# Patient Record
Sex: FEMALE | Race: WHITE | Employment: UNEMPLOYED | URBAN - METROPOLITAN AREA
[De-identification: names, ages, dates, MRNs, and addresses within clinical notes are randomized per-mention and may not be internally consistent; named-entity substitution may affect disease eponyms.]

---

## 2017-02-07 ENCOUNTER — OFFICE VISIT (OUTPATIENT)
Dept: FAMILY MEDICINE CLINIC | Age: 22
End: 2017-02-07

## 2017-02-07 VITALS
OXYGEN SATURATION: 97 % | DIASTOLIC BLOOD PRESSURE: 68 MMHG | SYSTOLIC BLOOD PRESSURE: 114 MMHG | BODY MASS INDEX: 30.6 KG/M2 | HEIGHT: 64 IN | HEART RATE: 82 BPM | TEMPERATURE: 98.1 F | WEIGHT: 179.25 LBS | RESPIRATION RATE: 16 BRPM

## 2017-02-07 DIAGNOSIS — Z11.1 SCREENING-PULMONARY TB: ICD-10-CM

## 2017-02-07 DIAGNOSIS — Z23 ENCOUNTER FOR IMMUNIZATION: ICD-10-CM

## 2017-02-07 DIAGNOSIS — Z00.00 WELL ADULT EXAM: Primary | ICD-10-CM

## 2017-02-07 NOTE — PROGRESS NOTES
1. Have you been to the ER, urgent care clinic since your last visit? Hospitalized since your last visit? No    2. Have you seen or consulted any other health care providers outside of the [address] [place] [address] since your last visit? Include any pap smears or colon screening.  No    Chief Complaint   Patient presents with    Complete Physical     college    Labs     not fasting

## 2017-02-07 NOTE — PROGRESS NOTES
Subjective:   24 y.o. 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    Patient Active Problem List    Diagnosis Date Noted    Mild preeclampsia delivered 2014    S/P  section 2014    Term  delivered by  section, current hospitalization 2014     Current Outpatient Prescriptions   Medication Sig Dispense Refill    ALPRAZolam (XANAX) 1 mg tablet Take 1 Tab by mouth daily as needed for Anxiety. 30 Tab 2    PARoxetine (PAXIL) 20 mg tablet Take 1 Tab by mouth nightly. 30 Tab 2    amphetamine-dextroamphetamine XR (ADDERALL XR) 30 mg XR capsule Take 1 Cap by mouth every morning. Max Daily Amount: 30 mg. Please fill on or after 17. 30 Cap 0    buPROPion XL (WELLBUTRIN XL) 300 mg XL tablet Take 1 Tab by mouth every morning. 30 Tab 2    tretinoin (RETIN-A) 0.025 % topical cream DOT A PEA SIZE AMOUNT TO DRY SKIN AROUND FACE & RUB IN EVERY OTHER NIGHT FOR 3-4 WEEKS THEN NIGHTLY  12    ibuprofen (MOTRIN) 800 mg tablet Take 1 Tab by mouth every eight (8) hours as needed for Pain. 61 Tab 2     Family History   Problem Relation Age of Onset    Hypertension Father     Hypertension Mother      Social History   Substance Use Topics    Smoking status: Never Smoker    Smokeless tobacco: Never Used    Alcohol use No        Vision: 20/25 together and delmi individually without correction. ROS: Feeling generally well. No TIA's or unusual headaches, no dysphagia. No prolonged cough. No dyspnea or chest pain on exertion. No abdominal pain, change in bowel habits, black or bloody stools. No urinary tract symptoms. No new or unusual musculoskeletal symptoms. Specific concerns today: she is required to have flu shot, ppd, urine dip and CBC for the program.    Objective: The patient appears well, alert, oriented x 3, in no distress.   Visit Vitals    /68    Pulse 82    Temp 98.1 °F (36.7 °C) (Oral)    Resp 16    Ht 5' 4\" (1.626 m)    Wt 179 lb 4 oz (81.3 kg)  SpO2 97%    BMI 30.77 kg/m2     ENT normal.  Neck supple. No adenopathy or thyromegaly. ESTHER. Lungs are clear, good air entry, no wheezes, rhonchi or rales. S1 and S2 normal, no murmurs, regular rate and rhythm. Abdomen soft without tenderness, guarding, mass or organomegaly. Extremities show no edema, normal peripheral pulses. Neurological is normal, no focal findings. Breast and Pelvic exams are deferred. Assessment/Plan:   Well Woman  lose weight, increase physical activity, follow low fat diet, follow low salt diet, routine labs ordered  Encounter Diagnoses   Name Primary?  Well adult exam Yes    Screening-pulmonary TB     Encounter for immunization      Orders Placed This Encounter    Influenza virus vaccine (QUADRIVALENT PRES FREE SYRINGE) IM 3 years and older    CBC WITH AUTOMATED DIFF    AMB POC URINALYSIS DIP STICK AUTO W/O MICRO     I have discussed the diagnosis with the patient and the intended plan as seen in the above orders. The patient has received an after-visit summary and questions were answered concerning future plans. Patient conveyed understanding of the plan at the time of the visit.     George Beasley, MSN, ANP  2/7/2017

## 2017-02-08 LAB
BASOPHILS # BLD AUTO: 0 X10E3/UL (ref 0–0.2)
BASOPHILS NFR BLD AUTO: 0 %
EOSINOPHIL # BLD AUTO: 0.2 X10E3/UL (ref 0–0.4)
EOSINOPHIL NFR BLD AUTO: 2 %
ERYTHROCYTE [DISTWIDTH] IN BLOOD BY AUTOMATED COUNT: 13.2 % (ref 12.3–15.4)
HCT VFR BLD AUTO: 40.5 % (ref 34–46.6)
HGB BLD-MCNC: 13.4 G/DL (ref 11.1–15.9)
IMM GRANULOCYTES # BLD: 0 X10E3/UL (ref 0–0.1)
IMM GRANULOCYTES NFR BLD: 0 %
LYMPHOCYTES # BLD AUTO: 2 X10E3/UL (ref 0.7–3.1)
LYMPHOCYTES NFR BLD AUTO: 21 %
MCH RBC QN AUTO: 30.5 PG (ref 26.6–33)
MCHC RBC AUTO-ENTMCNC: 33.1 G/DL (ref 31.5–35.7)
MCV RBC AUTO: 92 FL (ref 79–97)
MONOCYTES # BLD AUTO: 0.7 X10E3/UL (ref 0.1–0.9)
MONOCYTES NFR BLD AUTO: 7 %
NEUTROPHILS # BLD AUTO: 6.8 X10E3/UL (ref 1.4–7)
NEUTROPHILS NFR BLD AUTO: 70 %
PLATELET # BLD AUTO: 298 X10E3/UL (ref 150–379)
RBC # BLD AUTO: 4.39 X10E6/UL (ref 3.77–5.28)
WBC # BLD AUTO: 9.6 X10E3/UL (ref 3.4–10.8)

## 2017-02-08 NOTE — PROGRESS NOTES
RECOMMENDATIONS:  Your blood count is in normal range.   A copy of this lab is provided for your college program required physical.

## 2017-02-13 ENCOUNTER — CLINICAL SUPPORT (OUTPATIENT)
Dept: FAMILY MEDICINE CLINIC | Age: 22
End: 2017-02-13

## 2017-02-13 VITALS — HEIGHT: 64 IN

## 2017-03-15 ENCOUNTER — OFFICE VISIT (OUTPATIENT)
Dept: FAMILY MEDICINE CLINIC | Age: 22
End: 2017-03-15

## 2017-03-15 VITALS
DIASTOLIC BLOOD PRESSURE: 84 MMHG | BODY MASS INDEX: 29.56 KG/M2 | SYSTOLIC BLOOD PRESSURE: 144 MMHG | HEIGHT: 64 IN | OXYGEN SATURATION: 99 % | RESPIRATION RATE: 16 BRPM | HEART RATE: 79 BPM | WEIGHT: 173.13 LBS | TEMPERATURE: 98 F

## 2017-03-15 DIAGNOSIS — F90.2 ADHD (ATTENTION DEFICIT HYPERACTIVITY DISORDER), COMBINED TYPE: ICD-10-CM

## 2017-03-15 DIAGNOSIS — F41.1 GAD (GENERALIZED ANXIETY DISORDER): ICD-10-CM

## 2017-03-15 RX ORDER — DEXTROAMPHETAMINE SACCHARATE, AMPHETAMINE ASPARTATE MONOHYDRATE, DEXTROAMPHETAMINE SULFATE AND AMPHETAMINE SULFATE 7.5; 7.5; 7.5; 7.5 MG/1; MG/1; MG/1; MG/1
30 CAPSULE, EXTENDED RELEASE ORAL
Qty: 30 CAP | Refills: 0 | Status: SHIPPED | OUTPATIENT
Start: 2017-03-15 | End: 2017-05-26 | Stop reason: SDUPTHER

## 2017-03-15 RX ORDER — ALPRAZOLAM 1 MG/1
1 TABLET ORAL
Qty: 30 TAB | Refills: 2 | Status: SHIPPED | OUTPATIENT
Start: 2017-03-15 | End: 2017-03-20 | Stop reason: ALTCHOICE

## 2017-03-15 RX ORDER — NORETHINDRONE ACETATE AND ETHINYL ESTRADIOL AND FERROUS FUMARATE 1MG-20(21)
KIT ORAL
Refills: 0 | COMMUNITY
Start: 2017-02-13 | End: 2017-05-26

## 2017-03-15 NOTE — MR AVS SNAPSHOT
Visit Information Date & Time Provider Department Dept. Phone Encounter #  
 3/15/2017 11:30 AM Tyson Chapin, NP [address] [facility] [phone] [id number] Upcoming Health Maintenance Date Due  
 HPV AGE 9Y-34Y (1 of 3 - Female 3 Dose Series) 9/19/2005 PAP AKA CERVICAL CYTOLOGY 9/19/2015 DTaP/Tdap/Td series (2 - Td) 5/15/2024 Allergies as of 3/15/2017  Review Complete On: 3/15/2017 By: Evon Linn LPN No Known Allergies Current Immunizations  Reviewed on 5/15/2014 Name Date Influenza Vaccine (Quad) PF 2/7/2017 Tdap 5/15/2014 Not reviewed this visit You Were Diagnosed With   
  
 Codes Comments ADHD (attention deficit hyperactivity disorder), combined type     ICD-10-CM: F90.2 ICD-9-CM: 314.01   
 ROSALIO (generalized anxiety disorder)     ICD-10-CM: F41.1 ICD-9-CM: 300.02 Vitals BP Pulse Temp Resp Height(growth percentile) Weight(growth percentile) 144/84 79 98 °F (36.7 °C) (Oral) 16 5' 4\" (1.626 m) 173 lb 2 oz (78.5 kg) LMP SpO2 Breastfeeding? BMI OB Status Smoking Status 02/22/2017 (Approximate) 99% No 29.72 kg/m2 Having regular periods Never Smoker Vitals History BMI and BSA Data Body Mass Index Body Surface Area  
 29.72 kg/m 2 1.88 m 2 Preferred Pharmacy Pharmacy Name Phone [id number] [place], [address] [facility] [phone] Your Updated Medication List  
  
   
This list is accurate as of: 3/15/17 11:54 AM.  Always use your most recent med list.  
  
  
  
  
 ALPRAZolam 1 mg tablet Commonly known as:  Alla Lilian Take 1 Tab by mouth daily as needed for Anxiety. * amphetamine-dextroamphetamine XR 30 mg XR capsule Commonly known as:  ADDERALL XR Take 1 Cap (30 mg total) by mouth every morning.  - must last 30 days * amphetamine-dextroamphetamine XR 30 mg XR capsule Commonly known as:  ADDERALL XR  
 Take 1 Cap (30 mg total) by mouth every morning.  -must last 30 days * amphetamine-dextroamphetamine XR 30 mg XR capsule Commonly known as:  ADDERALL XR Take 1 Cap (30 mg total) by mouth every morning.  - must last 30 days buPROPion  mg XL tablet Commonly known as:  Abhishek Emerald Take 1 Tab by mouth every morning. ibuprofen 800 mg tablet Commonly known as:  MOTRIN Take 1 Tab by mouth every eight (8) hours as needed for Pain. JUNEL FE 1/20 (28) 1 mg-20 mcg (21)/75 mg (7) Tab Generic drug:  norethindrone-ethinyl estradiol TAKE 1 TABLET BY MOUTH ONCE DAILY. PARoxetine 20 mg tablet Commonly known as:  PAXIL Take 1 Tab by mouth nightly. tretinoin 0.025 % topical cream  
Commonly known as:  RETIN-A  
DOT A PEA SIZE AMOUNT TO DRY SKIN AROUND FACE & RUB IN EVERY OTHER NIGHT FOR 3-4 WEEKS THEN NIGHTLY * Notice: This list has 3 medication(s) that are the same as other medications prescribed for you. Read the directions carefully, and ask your doctor or other care provider to review them with you. Prescriptions Printed Refills  
 amphetamine-dextroamphetamine XR (ADDERALL XR) 30 mg XR capsule 0 Sig: Take 1 Cap (30 mg total) by mouth every morning.  - must last 30 days Class: Print Route: Oral  
 amphetamine-dextroamphetamine XR (ADDERALL XR) 30 mg XR capsule 0 Sig: Take 1 Cap (30 mg total) by mouth every morning.  -must last 30 days Class: Print Route: Oral  
 amphetamine-dextroamphetamine XR (ADDERALL XR) 30 mg XR capsule 0 Sig: Take 1 Cap (30 mg total) by mouth every morning.  - must last 30 days Class: Print Route: Oral  
 ALPRAZolam (XANAX) 1 mg tablet 2 Sig: Take 1 Tab by mouth daily as needed for Anxiety. Class: Print Route: Oral  
  
Introducing [facility]! Dear Hilton Broderick: Thank you for requesting a [employer] account.   Our records indicate that you already have an active [employer] account. You can access your account anytime at [email or web address] Did you know that you can access your hospital and ER discharge instructions at any time in [employer]? You can also review all of your test results from your hospital stay or ER visit. Additional Information If you have questions, please visit the Frequently Asked Questions section of the [employer] website at [email or web address] Remember, [employer] is NOT to be used for urgent needs. For medical emergencies, dial 911. Now available from your iPhone and Android! Please provide this summary of care documentation to your next provider. Your primary care clinician is listed as Amanuel Georges. If you have any questions after today's visit, please call [phone].

## 2017-03-15 NOTE — PROGRESS NOTES
HISTORY OF PRESENT ILLNESS  Mark Garcia is a 24 y.o. female. HPI  Patient returns to office for follow up ADD. Stable on medication without weight loss, decreased appetite, insomnia, or tremor. Good focus control. Gets three months of scripts at a time. She is on Adderall XR 30mg a day. She also needs to have xanax refilled that uses nightly. ROS  A comprehensive review of system was obtained and negative except findings in the HPI    Visit Vitals    /84    Pulse 79    Temp 98 °F (36.7 °C) (Oral)    Resp 16    Ht 5' 4\" (1.626 m)    Wt 173 lb 2 oz (78.5 kg)    LMP 02/22/2017 (Approximate)    SpO2 99%    Breastfeeding No    BMI 29.72 kg/m2     Physical Exam   Constitutional: She is oriented to person, place, and time. She appears well-developed and well-nourished. Neck: No JVD present. Cardiovascular: Normal rate, regular rhythm and intact distal pulses. Exam reveals no gallop and no friction rub. No murmur heard. Pulmonary/Chest: Effort normal and breath sounds normal. No respiratory distress. She has no wheezes. Musculoskeletal: She exhibits no edema. Neurological: She is alert and oriented to person, place, and time. Skin: Skin is warm. Nursing note and vitals reviewed. ASSESSMENT and PLAN  Encounter Diagnoses   Name Primary?  ADHD (attention deficit hyperactivity disorder), combined type     ROSALIO (generalized anxiety disorder)      Orders Placed This Encounter    amphetamine-dextroamphetamine XR (ADDERALL XR) 30 mg XR capsule    amphetamine-dextroamphetamine XR (ADDERALL XR) 30 mg XR capsule    amphetamine-dextroamphetamine XR (ADDERALL XR) 30 mg XR capsule    ALPRAZolam (XANAX) 1 mg tablet     Given refills x 3 mo of both meds  Reviewed alternative like Strattera for the future    I have discussed the diagnosis with the patient and the intended plan as seen in the above orders.  The patient has received an after-visit summary and questions were answered concerning future plans. Patient conveyed understanding of the plan at the time of the visit.     Yunior Kaplan, MSN, ANP  3/15/2017

## 2017-03-15 NOTE — PROGRESS NOTES
1. Have you been to the ER, urgent care clinic since your last visit? Hospitalized since your last visit? No    2. Have you seen or consulted any other health care providers outside of the [address] [place] [address] since your last visit? Include any pap smears or colon screening.  No    Chief Complaint   Patient presents with    Follow-up     med ck

## 2017-03-20 RX ORDER — CLONAZEPAM 0.5 MG/1
0.5 TABLET ORAL 2 TIMES DAILY
Qty: 60 TAB | Refills: 2 | OUTPATIENT
Start: 2017-03-20 | End: 2017-09-26

## 2017-05-26 ENCOUNTER — OFFICE VISIT (OUTPATIENT)
Dept: FAMILY MEDICINE CLINIC | Age: 22
End: 2017-05-26

## 2017-05-26 VITALS
SYSTOLIC BLOOD PRESSURE: 112 MMHG | OXYGEN SATURATION: 99 % | BODY MASS INDEX: 29.02 KG/M2 | WEIGHT: 170 LBS | HEART RATE: 82 BPM | RESPIRATION RATE: 18 BRPM | TEMPERATURE: 98.2 F | HEIGHT: 64 IN | DIASTOLIC BLOOD PRESSURE: 70 MMHG

## 2017-05-26 DIAGNOSIS — F90.2 ADHD (ATTENTION DEFICIT HYPERACTIVITY DISORDER), COMBINED TYPE: ICD-10-CM

## 2017-05-26 DIAGNOSIS — G47.00 INSOMNIA, UNSPECIFIED: ICD-10-CM

## 2017-05-26 DIAGNOSIS — R53.82 CHRONIC FATIGUE: Primary | ICD-10-CM

## 2017-05-26 RX ORDER — DEXTROAMPHETAMINE SACCHARATE, AMPHETAMINE ASPARTATE MONOHYDRATE, DEXTROAMPHETAMINE SULFATE AND AMPHETAMINE SULFATE 7.5; 7.5; 7.5; 7.5 MG/1; MG/1; MG/1; MG/1
30 CAPSULE, EXTENDED RELEASE ORAL
Qty: 30 CAP | Refills: 0 | Status: SHIPPED | OUTPATIENT
Start: 2017-05-26 | End: 2017-06-19 | Stop reason: SDUPTHER

## 2017-05-26 RX ORDER — CLONIDINE HYDROCHLORIDE 0.1 MG/1
0.1 TABLET ORAL
Qty: 30 TAB | Refills: 2 | Status: SHIPPED | OUTPATIENT
Start: 2017-05-26 | End: 2017-09-26

## 2017-05-26 NOTE — PROGRESS NOTES
Chief Complaint   Patient presents with    Behavioral Problem     adhd    Depression    Anxiety    Labs     iron     Patient in office today for med check; states medication is doing well for adhd;     Pt would like to discuss with md wellbutrin; states depression sx have gotten worse in the past couple of weeks; Pt states she would like to have labs drawn due to severe fatigue. 1. Have you been to the ER, urgent care clinic since your last visit? Hospitalized since your last visit? No    2. Have you seen or consulted any other health care providers outside of the [address] [place] [address] since your last visit? Include any pap smears or colon screening. No    Monday - Thursday will get her daughter ready and drop her off at . Going to school from 9-145. Short break, and then working from about 3-11. Has been trying to exercise when she can squeeze it in. Pt has not been able to sleep well. Staying up until 2-3 am. Will have restless nights. Has been seeing a therapist for behavioral.     Struggling with school stress and co parenting. Situational issues. wellbutrin has helped with appetite and cravings. Denies any other concerns at this time. Chief Complaint   Patient presents with    Behavioral Problem     adhd    Depression    Anxiety    Labs     iron     she is a 24y.o. year old female who presents for evalution. Reviewed PmHx, RxHx, FmHx, SocHx, AllgHx and updated and dated in the chart.     Review of Systems - negative except as listed above in the HPI    Objective:     Vitals:    05/26/17 1029   BP: 112/70   Pulse: 82   Resp: 18   Temp: 98.2 °F (36.8 °C)   TempSrc: Oral   SpO2: 99%   Weight: 170 lb (77.1 kg)   Height: 5' 4\" (1.626 m)     Physical Examination: General appearance - alert, well appearing, and in no distress  Mental status - normal mood, behavior  Eyes - pupils equal and reactive, extraocular eye movements intact  Ears - bilateral TM's and external ear canals normal  Nose - normal and patent, no erythema, discharge or polyps and normal nontender sinuses  Mouth - mucous membranes moist, pharynx normal without lesions  Neck - supple, no significant adenopathy, carotids upstroke normal bilaterally, no bruits, thyroid exam: thyroid is normal in size without nodules or tenderness  Chest - clear to auscultation, no wheezes, rales or rhonchi, symmetric air entry  Heart - normal rate, regular rhythm, normal S1, S2, no murmurs  Extremities - peripheral pulses normal, no ankle edema, no clubbing or cyanosis  Skin - normal coloration and turgor, no rashes, no suspicious skin lesions noted    Assessment/ Plan:   Arthur Jones was seen today for behavioral problem, depression, anxiety and labs. Diagnoses and all orders for this visit:    Chronic fatigue  -     METABOLIC PANEL, COMPREHENSIVE  -     CBC WITH AUTOMATED DIFF  -     TSH 3RD GENERATION  -     IRON PROFILE  Will notify results and deviate plan based on findings. ADHD (attention deficit hyperactivity disorder), combined type  -     amphetamine-dextroamphetamine XR (ADDERALL XR) 30 mg XR capsule; Take 1 Cap (30 mg total) by mouth every morning. Please fill on or after 6/25/17.  -     amphetamine-dextroamphetamine XR (ADDERALL XR) 30 mg XR capsule; Take 1 Cap (30 mg total) by mouth every morning. Max Daily Amount: 30 mg  -     amphetamine-dextroamphetamine XR (ADDERALL XR) 30 mg XR capsule; Take 1 Cap (30 mg total) by mouth every morning. Please fill on or after 7/25/17. Max Daily Amount: 30 mg  Continue current dose of adderall as directed. Insomnia, unspecified  -     cloNIDine HCl (CATAPRES) 0.1 mg tablet; Take 1 Tab by mouth nightly as needed. Enc pt to try clonidine before bedtime to address insomnia as this is likely contributing to her fatigue. Reviewed SEs/ADRs of medication. Follow up if sx persist or worsen. Follow-up Disposition:  Return if symptoms worsen or fail to improve.     I have discussed the diagnosis with the patient and the intended plan as seen in the above orders. The patient has received an after-visit summary and questions were answered concerning future plans. Medication Side Effects and Warnings were discussed with patient: yes  Patient Labs were reviewed and or requested: no  Patient Past Records were reviewed and or requested  yes  Patient / Caregiver Understanding of treatment plan was verbalized during office visit YES    HALEY Coombs    There are no Patient Instructions on file for this visit.

## 2017-05-26 NOTE — PROGRESS NOTES
Chief Complaint   Patient presents with    Behavioral Problem     adhd    Depression    Anxiety    Labs     iron     Patient in office today for med check; states medication is doing well for adhd; would like to discuss with md wellbutrin; states depression sx have gotten worse in the past couple of weeks; pt states she would like to have labs drawn due to severe fatigue. 1. Have you been to the ER, urgent care clinic since your last visit? Hospitalized since your last visit? No    2. Have you seen or consulted any other health care providers outside of the [facility] since your last visit? Include any pap smears or colon screening.  No

## 2017-05-27 LAB
ALBUMIN SERPL-MCNC: 4.5 G/DL (ref 3.5–5.5)
ALBUMIN/GLOB SERPL: 2.3 {RATIO} (ref 1.2–2.2)
ALP SERPL-CCNC: 87 IU/L (ref 39–117)
ALT SERPL-CCNC: 21 IU/L (ref 0–32)
AST SERPL-CCNC: 23 IU/L (ref 0–40)
BASOPHILS # BLD AUTO: 0 X10E3/UL (ref 0–0.2)
BASOPHILS NFR BLD AUTO: 0 %
BILIRUB SERPL-MCNC: <0.2 MG/DL (ref 0–1.2)
BUN SERPL-MCNC: 13 MG/DL (ref 6–20)
BUN/CREAT SERPL: 16 (ref 9–23)
CALCIUM SERPL-MCNC: 9.2 MG/DL (ref 8.7–10.2)
CHLORIDE SERPL-SCNC: 103 MMOL/L (ref 96–106)
CO2 SERPL-SCNC: 24 MMOL/L (ref 18–29)
CREAT SERPL-MCNC: 0.79 MG/DL (ref 0.57–1)
EOSINOPHIL # BLD AUTO: 0.3 X10E3/UL (ref 0–0.4)
EOSINOPHIL NFR BLD AUTO: 3 %
ERYTHROCYTE [DISTWIDTH] IN BLOOD BY AUTOMATED COUNT: 12.8 % (ref 12.3–15.4)
GLOBULIN SER CALC-MCNC: 2 G/DL (ref 1.5–4.5)
GLUCOSE SERPL-MCNC: 78 MG/DL (ref 65–99)
HCT VFR BLD AUTO: 39.8 % (ref 34–46.6)
HGB BLD-MCNC: 12.8 G/DL (ref 11.1–15.9)
IMM GRANULOCYTES # BLD: 0 X10E3/UL (ref 0–0.1)
IMM GRANULOCYTES NFR BLD: 0 %
IRON SATN MFR SERPL: 15 % (ref 15–55)
IRON SERPL-MCNC: 53 UG/DL (ref 27–159)
LYMPHOCYTES # BLD AUTO: 3.1 X10E3/UL (ref 0.7–3.1)
LYMPHOCYTES NFR BLD AUTO: 32 %
MCH RBC QN AUTO: 30 PG (ref 26.6–33)
MCHC RBC AUTO-ENTMCNC: 32.2 G/DL (ref 31.5–35.7)
MCV RBC AUTO: 93 FL (ref 79–97)
MONOCYTES # BLD AUTO: 0.8 X10E3/UL (ref 0.1–0.9)
MONOCYTES NFR BLD AUTO: 8 %
NEUTROPHILS # BLD AUTO: 5.4 X10E3/UL (ref 1.4–7)
NEUTROPHILS NFR BLD AUTO: 57 %
PLATELET # BLD AUTO: 263 X10E3/UL (ref 150–379)
POTASSIUM SERPL-SCNC: 4 MMOL/L (ref 3.5–5.2)
PROT SERPL-MCNC: 6.5 G/DL (ref 6–8.5)
RBC # BLD AUTO: 4.26 X10E6/UL (ref 3.77–5.28)
SODIUM SERPL-SCNC: 143 MMOL/L (ref 134–144)
TIBC SERPL-MCNC: 360 UG/DL (ref 250–450)
TSH SERPL DL<=0.005 MIU/L-ACNC: 2.29 UIU/ML (ref 0.45–4.5)
UIBC SERPL-MCNC: 307 UG/DL (ref 131–425)
WBC # BLD AUTO: 9.7 X10E3/UL (ref 3.4–10.8)

## 2017-05-30 RX ORDER — FLUCONAZOLE 150 MG/1
150 TABLET ORAL DAILY
Qty: 1 TAB | Refills: 1 | Status: SHIPPED | OUTPATIENT
Start: 2017-05-30 | End: 2017-05-31

## 2017-05-30 NOTE — PROGRESS NOTES
The following message was sent to pt via [employer] portal in reference to lab results:    Good evening Ms. Gamal Man are the results of your most recent lab work. I have the following recommendations:    1. Your CBC which looks at your white blood cells, red blood cells, and hemoglobin came back looking normal. No sign of infection or anemia. 2. Your metabolic panel which looks at your blood glucose, liver function, and kidney function looks perfect. 3. Your TSH which screens for thyroid disease came back normal. This means you do not have hyper or hypothyroidism. 4. Your iron levels are normal.     I recommend you continue with the plan discussed during your office visit and follow up if the chronic fatigue or insomnia persist or worsen.      ETHAN LottC

## 2017-06-19 ENCOUNTER — TELEPHONE (OUTPATIENT)
Dept: FAMILY MEDICINE CLINIC | Age: 22
End: 2017-06-19

## 2017-06-19 DIAGNOSIS — F90.2 ADHD (ATTENTION DEFICIT HYPERACTIVITY DISORDER), COMBINED TYPE: ICD-10-CM

## 2017-06-19 RX ORDER — DEXTROAMPHETAMINE SACCHARATE, AMPHETAMINE ASPARTATE MONOHYDRATE, DEXTROAMPHETAMINE SULFATE AND AMPHETAMINE SULFATE 7.5; 7.5; 7.5; 7.5 MG/1; MG/1; MG/1; MG/1
30 CAPSULE, EXTENDED RELEASE ORAL
Qty: 30 CAP | Refills: 0 | Status: SHIPPED | OUTPATIENT
Start: 2017-06-19 | End: 2017-09-26 | Stop reason: SDUPTHER

## 2017-06-19 NOTE — TELEPHONE ENCOUNTER
Pt called stating that she needs to get an early refill on her adderall because the date on the June rx is wrong, pt stated that she already had the May rx from her previous appt and she filled it on May 19th, pt states she only rec'd June and July at her last ov. Pt would like a call back at [phone]. Natalia and RONAK both tried to explain to pt that in her chart it showed that she rec'd May, June, and July.

## 2017-06-19 NOTE — TELEPHONE ENCOUNTER
Spoke with pt stated she had med check on 5/25; pt states script on rx's for travon needs to say the 19th of each month for fill date; pt stated she filled her last rx on 5/19; need for nurse to call and give approval to fill early for June and July or  new scripts with correct dates.

## 2017-08-24 ENCOUNTER — OFFICE VISIT (OUTPATIENT)
Dept: FAMILY MEDICINE CLINIC | Age: 22
End: 2017-08-24

## 2017-08-24 VITALS
BODY MASS INDEX: 30.56 KG/M2 | HEIGHT: 64 IN | SYSTOLIC BLOOD PRESSURE: 137 MMHG | DIASTOLIC BLOOD PRESSURE: 78 MMHG | HEART RATE: 89 BPM | WEIGHT: 179 LBS | TEMPERATURE: 98 F | RESPIRATION RATE: 16 BRPM | OXYGEN SATURATION: 99 %

## 2017-08-24 DIAGNOSIS — K52.9 GASTROENTERITIS: Primary | ICD-10-CM

## 2017-08-24 DIAGNOSIS — N94.6 DYSMENORRHEA: ICD-10-CM

## 2017-08-24 RX ORDER — NORETHINDRONE ACETATE AND ETHINYL ESTRADIOL 1; .02 MG/1; MG/1
1 TABLET ORAL DAILY
Qty: 1 PACKAGE | Refills: 11 | Status: SHIPPED | OUTPATIENT
Start: 2017-08-24 | End: 2018-04-04 | Stop reason: ALTCHOICE

## 2017-08-24 RX ORDER — ONDANSETRON 8 MG/1
8 TABLET, ORALLY DISINTEGRATING ORAL
Qty: 15 TAB | Refills: 0 | Status: SHIPPED | OUTPATIENT
Start: 2017-08-24 | End: 2017-09-26

## 2017-08-24 NOTE — PROGRESS NOTES
1. Have you been to the ER, urgent care clinic since your last visit? Hospitalized since your last visit? No    2. Have you seen or consulted any other health care providers outside of the [address] [place] [address] since your last visit? Include any pap smears or colon screening.  No     Chief Complaint   Patient presents with    Nausea     x 1 day    Diarrhea     x 2 days

## 2017-08-24 NOTE — MR AVS SNAPSHOT
Visit Information Date & Time Provider Department Dept. Phone Encounter #  
 8/24/2017  9:15 AM Sol Laguerre NP [address] [facility] [phone] [id number] Follow-up Instructions Return if symptoms worsen or fail to improve. Upcoming Health Maintenance Date Due  
 HPV AGE 9Y-34Y (1 of 3 - Female 3 Dose Series) 9/19/2005 PAP AKA CERVICAL CYTOLOGY 9/19/2015 INFLUENZA AGE 9 TO ADULT 8/1/2017 DTaP/Tdap/Td series (2 - Td) 5/15/2024 Allergies as of 8/24/2017  Review Complete On: 8/24/2017 By: Sol Laguerre NP No Known Allergies Current Immunizations  Reviewed on 5/15/2014 Name Date Influenza Vaccine (Quad) PF 2/7/2017 Tdap 5/15/2014 Not reviewed this visit You Were Diagnosed With   
  
 Codes Comments Gastroenteritis    -  Primary ICD-10-CM: K52.9 ICD-9-CM: 558. 9 Dysmenorrhea     ICD-10-CM: N94.6 ICD-9-CM: 918. 3 Vitals BP Pulse Temp Resp Height(growth percentile) Weight(growth percentile)  
 137/78 89 98 °F (36.7 °C) (Oral) 16 5' 4\" (1.626 m) 179 lb (81.2 kg) LMP SpO2 BMI OB Status Smoking Status 08/01/2017 (Exact Date) 99% 30.73 kg/m2 Having regular periods Never Smoker BMI and BSA Data Body Mass Index Body Surface Area 30.73 kg/m 2 1.91 m 2 Preferred Pharmacy Pharmacy Name Phone [id number] [address] [phone] Your Updated Medication List  
  
   
This list is accurate as of: 8/24/17  9:49 AM.  Always use your most recent med list.  
  
  
  
  
 * amphetamine-dextroamphetamine XR 30 mg XR capsule Commonly known as:  ADDERALL XR Take 1 Cap (30 mg total) by mouth every morning. Please fill on or after 8/18/17. * amphetamine-dextroamphetamine XR 30 mg XR capsule Commonly known as:  ADDERALL XR Take 1 Cap (30 mg total) by mouth every morning. Please fill on or after 7/19/17.  Max Daily Amount: 30 mg  
  
 * amphetamine-dextroamphetamine XR 30 mg XR capsule Commonly known as:  ADDERALL XR Take 1 Cap (30 mg total) by mouth every morning. Max Daily Amount: 30 mg  
  
 buPROPion  mg XL tablet Commonly known as:  Ahmetajit Dm Take 1 Tab by mouth every morning. clonazePAM 0.5 mg tablet Commonly known as:  Maxine Richard Take 1 Tab by mouth two (2) times a day. cloNIDine HCl 0.1 mg tablet Commonly known as:  CATAPRES Take 1 Tab by mouth nightly as needed. norethindrone-ethinyl estradiol 1-20 mg-mcg Tab Commonly known as:  JUNEL 1/20 (21) Take 1 Tab by mouth daily. ondansetron 8 mg disintegrating tablet Commonly known as:  ZOFRAN ODT Take 1 Tab by mouth every eight (8) hours as needed for Nausea. * Notice: This list has 3 medication(s) that are the same as other medications prescribed for you. Read the directions carefully, and ask your doctor or other care provider to review them with you. Prescriptions Sent to Pharmacy Refills  
 ondansetron (ZOFRAN ODT) 8 mg disintegrating tablet 0 Sig: Take 1 Tab by mouth every eight (8) hours as needed for Nausea. Class: Normal  
 Pharmacy: [facility] Ph #: [phone] Route: Oral  
 norethindrone-ethinyl estradiol (JUNEL 1/20, 21,) 1-20 mg-mcg tab 11 Sig: Take 1 Tab by mouth daily. Class: Normal  
 Pharmacy: [facility] Ph #: [phone] Route: Oral  
  
Follow-up Instructions Return if symptoms worsen or fail to improve. Patient Instructions Gastroenteritis: Care Instructions Your Care Instructions Gastroenteritis is an illness that may cause nausea, vomiting, and diarrhea. It is sometimes called \"stomach flu. \" It can be caused by bacteria or a virus. You will probably begin to feel better in 1 to 2 days.  In the meantime, get plenty of rest and make sure you do not become dehydrated. Dehydration occurs when your body loses too much fluid. Follow-up care is a key part of your treatment and safety. Be sure to make and go to all appointments, and call your doctor if you are having problems. Its also a good idea to know your test results and keep a list of the medicines you take. How can you care for yourself at home? · If your doctor prescribed antibiotics, take them as directed. Do not stop taking them just because you feel better. You need to take the full course of antibiotics. · Drink plenty of fluids to prevent dehydration, enough so that your urine is light yellow or clear like water. Choose water and other caffeine-free clear liquids until you feel better. If you have kidney, heart, or liver disease and have to limit fluids, talk with your doctor before you increase your fluid intake. · Drink fluids slowly, in frequent, small amounts, because drinking too much too fast can cause vomiting. · Begin eating mild foods, such as dry toast, yogurt, applesauce, bananas, and rice. Avoid spicy, hot, or high-fat foods, and do not drink alcohol or caffeine for a day or two. Do not drink milk or eat ice cream until you are feeling better. How to prevent gastroenteritis · Keep hot foods hot and cold foods cold. · Do not eat meats, dressings, salads, or other foods that have been kept at room temperature for more than 2 hours. · Use a thermometer to check your refrigerator. It should be between 34°F and 40°F. 
· Defrost meats in the refrigerator or microwave, not on the kitchen counter. · Keep your hands and your kitchen clean. Wash your hands, cutting boards, and countertops with hot soapy water frequently. · Cook meat until it is well done. · Do not eat raw eggs or uncooked sauces made with raw eggs. · Do not take chances. If food looks or tastes spoiled, throw it out. When should you call for help? Call 911 anytime you think you may need emergency care. For example, call if: 
· You vomit blood or what looks like coffee grounds. · You passed out (lost consciousness). · You pass maroon or very bloody stools. Call your doctor now or seek immediate medical care if: 
· You have severe belly pain. · You have signs of needing more fluids. You have sunken eyes, a dry mouth, and pass only a little dark urine. · You feel like you are going to faint. · You have increased belly pain that does not go away in 1 to 2 days. · You have new or increased nausea, or you are vomiting. · You have a new or higher fever. · Your stools are black and tarlike or have streaks of blood. Watch closely for changes in your health, and be sure to contact your doctor if: 
· You are dizzy or lightheaded. · You urinate less than usual, or your urine is dark yellow or brown. · You do not feel better with each day that goes by. Where can you learn more? Go to http://luly-donavon.info/. Enter N142 in the search box to learn more about \"Gastroenteritis: Care Instructions. \" Current as of: March 3, 2017 Content Version: 11.3 © 9850-4152 Ar[employer]. Care instructions adapted under license by [employer] (which disclaims liability or warranty for this information). If you have questions about a medical condition or this instruction, always ask your healthcare professional. [place] [address] any warranty or liability for your use of this information. Introducing [facility] & HEALTH SERVICES! Dear Geneva Dela Cruz: Thank you for requesting a datango account. Our records indicate that you already have an active datango account. You can access your account anytime at [email or web address] Did you know that you can access your hospital and ER discharge instructions at any time in datango? You can also review all of your test results from your hospital stay or ER visit. Additional Information If you have questions, please visit the Frequently Asked Questions section of the [email or web address] website at [email or web address] Remember, [employer] is NOT to be used for urgent needs. For medical emergencies, dial 911. Now available from your iPhone and Android! Please provide this summary of care documentation to your next provider. Your primary care clinician is listed as Amanuel Georges. If you have any questions after today's visit, please call [phone].

## 2017-08-24 NOTE — PATIENT INSTRUCTIONS
Gastroenteritis: Care Instructions  Your Care Instructions  Gastroenteritis is an illness that may cause nausea, vomiting, and diarrhea. It is sometimes called \"stomach flu. \" It can be caused by bacteria or a virus. You will probably begin to feel better in 1 to 2 days. In the meantime, get plenty of rest and make sure you do not become dehydrated. Dehydration occurs when your body loses too much fluid. Follow-up care is a key part of your treatment and safety. Be sure to make and go to all appointments, and call your doctor if you are having problems. Its also a good idea to know your test results and keep a list of the medicines you take. How can you care for yourself at home? · If your doctor prescribed antibiotics, take them as directed. Do not stop taking them just because you feel better. You need to take the full course of antibiotics. · Drink plenty of fluids to prevent dehydration, enough so that your urine is light yellow or clear like water. Choose water and other caffeine-free clear liquids until you feel better. If you have kidney, heart, or liver disease and have to limit fluids, talk with your doctor before you increase your fluid intake. · Drink fluids slowly, in frequent, small amounts, because drinking too much too fast can cause vomiting. · Begin eating mild foods, such as dry toast, yogurt, applesauce, bananas, and rice. Avoid spicy, hot, or high-fat foods, and do not drink alcohol or caffeine for a day or two. Do not drink milk or eat ice cream until you are feeling better. How to prevent gastroenteritis  · Keep hot foods hot and cold foods cold. · Do not eat meats, dressings, salads, or other foods that have been kept at room temperature for more than 2 hours. · Use a thermometer to check your refrigerator. It should be between 34°F and 40°F.  · Defrost meats in the refrigerator or microwave, not on the kitchen counter. · Keep your hands and your kitchen clean.  Wash your hands, cutting boards, and countertops with hot soapy water frequently. · Cook meat until it is well done. · Do not eat raw eggs or uncooked sauces made with raw eggs. · Do not take chances. If food looks or tastes spoiled, throw it out. When should you call for help? Call 911 anytime you think you may need emergency care. For example, call if:  · You vomit blood or what looks like coffee grounds. · You passed out (lost consciousness). · You pass maroon or very bloody stools. Call your doctor now or seek immediate medical care if:  · You have severe belly pain. · You have signs of needing more fluids. You have sunken eyes, a dry mouth, and pass only a little dark urine. · You feel like you are going to faint. · You have increased belly pain that does not go away in 1 to 2 days. · You have new or increased nausea, or you are vomiting. · You have a new or higher fever. · Your stools are black and tarlike or have streaks of blood. Watch closely for changes in your health, and be sure to contact your doctor if:  · You are dizzy or lightheaded. · You urinate less than usual, or your urine is dark yellow or brown. · You do not feel better with each day that goes by. Where can you learn more? Go to http://luly-donavon.info/. Enter N142 in the search box to learn more about \"Gastroenteritis: Care Instructions. \"  Current as of: March 3, 2017  Content Version: 11.3  © 7649-7475 [employer]Ment[employer]. Care instructions adapted under license by Minimally invasive devices (which disclaims liability or warranty for this information). If you have questions about a medical condition or this instruction, always ask your healthcare professional. [address] any warranty or liability for your use of this information.

## 2017-08-24 NOTE — PROGRESS NOTES
Chief Complaint   Patient presents with    Nausea     x 1 day    Diarrhea     x 2 days     she is a 24y.o. year old female who presents for evalution. Yesterday pt woke up feeling poorly, has been having nausea/vomiting/diarrhea. Body aches, chills, headaches. Also requesting refills on BCP. States last pap was last year and normal at GYN. Has painful periods. Reviewed PmHx, RxHx, FmHx, SocHx, AllgHx and updated and dated in the chart. Review of Systems - negative except as listed above in the HPI    Objective:     Vitals:    08/24/17 0914   BP: 137/78   Pulse: 89   Resp: 16   Temp: 98 °F (36.7 °C)   TempSrc: Oral   SpO2: 99%   Weight: 179 lb (81.2 kg)   Height: 5' 4\" (1.626 m)     Physical Examination: General appearance - alert, well appearing, and in no distress  Chest - clear to auscultation, no wheezes, rales or rhonchi, symmetric air entry  Heart - normal rate, regular rhythm, normal S1, S2, no murmurs, rubs, clicks or gallops  Abdomen - soft, nontender, nondistended, no masses or organomegaly  bowel sounds hyperactive    Assessment/ Plan:   Diagnoses and all orders for this visit:    1. Gastroenteritis  -     ondansetron (ZOFRAN ODT) 8 mg disintegrating tablet; Take 1 Tab by mouth every eight (8) hours as needed for Nausea. JOHNNA diet, advance as tolerated. Gatorade to help with dehydration. F/U prn    2. Dysmenorrhea  -     norethindrone-ethinyl estradiol (JUNEL 1/20, 21,) 1-20 mg-mcg tab; Take 1 Tab by mouth daily. Stable, refills provided. Pt voiced understanding regarding plan of care. Follow-up Disposition:  Return if symptoms worsen or fail to improve. I have discussed the diagnosis with the patient and the intended plan as seen in the above orders. The patient has received an after-visit summary and questions were answered concerning future plans.      Medication Side Effects and Warnings were discussed with patient    Sindy Mckeon NP

## 2017-08-24 NOTE — LETTER
8/24/2017 9:49 AM 
 
Ms. Trina Verma [address] [facility] [address] Please excuse Ms. Courtney Huff from school 8/24/17 and 8/25/17 due to illness. Sincerely, Sj Rdz NP

## 2017-09-26 ENCOUNTER — OFFICE VISIT (OUTPATIENT)
Dept: FAMILY MEDICINE CLINIC | Age: 22
End: 2017-09-26

## 2017-09-26 VITALS
HEART RATE: 94 BPM | OXYGEN SATURATION: 97 % | HEIGHT: 64 IN | WEIGHT: 176 LBS | RESPIRATION RATE: 18 BRPM | DIASTOLIC BLOOD PRESSURE: 80 MMHG | BODY MASS INDEX: 30.05 KG/M2 | TEMPERATURE: 98.6 F | SYSTOLIC BLOOD PRESSURE: 124 MMHG

## 2017-09-26 DIAGNOSIS — E66.9 OBESITY (BMI 30.0-34.9): ICD-10-CM

## 2017-09-26 DIAGNOSIS — F90.2 ADHD (ATTENTION DEFICIT HYPERACTIVITY DISORDER), COMBINED TYPE: Primary | ICD-10-CM

## 2017-09-26 DIAGNOSIS — F41.1 GAD (GENERALIZED ANXIETY DISORDER): ICD-10-CM

## 2017-09-26 DIAGNOSIS — K21.9 GASTROESOPHAGEAL REFLUX DISEASE WITHOUT ESOPHAGITIS: ICD-10-CM

## 2017-09-26 RX ORDER — ALPRAZOLAM 1 MG/1
1 TABLET ORAL
Qty: 15 TAB | Refills: 2 | Status: SHIPPED | OUTPATIENT
Start: 2017-09-26 | End: 2018-06-15 | Stop reason: SDUPTHER

## 2017-09-26 RX ORDER — DEXTROAMPHETAMINE SACCHARATE, AMPHETAMINE ASPARTATE MONOHYDRATE, DEXTROAMPHETAMINE SULFATE AND AMPHETAMINE SULFATE 7.5; 7.5; 7.5; 7.5 MG/1; MG/1; MG/1; MG/1
30 CAPSULE, EXTENDED RELEASE ORAL
Qty: 30 CAP | Refills: 0 | Status: SHIPPED | OUTPATIENT
Start: 2017-09-26 | End: 2018-01-03

## 2017-09-26 RX ORDER — DEXTROAMPHETAMINE SACCHARATE, AMPHETAMINE ASPARTATE MONOHYDRATE, DEXTROAMPHETAMINE SULFATE AND AMPHETAMINE SULFATE 7.5; 7.5; 7.5; 7.5 MG/1; MG/1; MG/1; MG/1
30 CAPSULE, EXTENDED RELEASE ORAL
Qty: 30 CAP | Refills: 0 | Status: SHIPPED | OUTPATIENT
Start: 2017-09-26 | End: 2017-12-21 | Stop reason: SDUPTHER

## 2017-09-26 RX ORDER — PANTOPRAZOLE SODIUM 20 MG/1
20 TABLET, DELAYED RELEASE ORAL DAILY
Qty: 30 TAB | Refills: 2 | Status: SHIPPED | OUTPATIENT
Start: 2017-09-26 | End: 2018-04-04 | Stop reason: ALTCHOICE

## 2017-09-26 NOTE — MR AVS SNAPSHOT
Visit Information Date & Time Provider Department Dept. Phone Encounter #  
 9/26/2017 10:15 AM Tamika Calzada NP [address] [facility] [phone] [id number] Follow-up Instructions Return in about 3 months (around 12/26/2017). Upcoming Health Maintenance Date Due  
 HPV AGE 9Y-34Y (1 of 3 - Female 3 Dose Series) 9/19/2005 PAP AKA CERVICAL CYTOLOGY 9/19/2015 INFLUENZA AGE 9 TO ADULT 8/1/2017 DTaP/Tdap/Td series (2 - Td) 5/15/2024 Allergies as of 9/26/2017  Review Complete On: 9/26/2017 By: Tamika Calzada NP No Known Allergies Current Immunizations  Reviewed on 5/15/2014 Name Date Influenza Vaccine (Quad) PF 2/7/2017 Tdap 5/15/2014 Not reviewed this visit You Were Diagnosed With   
  
 Codes Comments ADHD (attention deficit hyperactivity disorder), combined type    -  Primary ICD-10-CM: F90.2 ICD-9-CM: 314.01 Obesity (BMI 30.0-34.9)     ICD-10-CM: R97.6 ICD-9-CM: 278.00 Gastroesophageal reflux disease without esophagitis     ICD-10-CM: K21.9 ICD-9-CM: 530.81 ROSALIO (generalized anxiety disorder)     ICD-10-CM: F41.1 ICD-9-CM: 300.02 Vitals BP Pulse Temp Resp Height(growth percentile) Weight(growth percentile) 124/80 (BP 1 Location: Right arm, BP Patient Position: Sitting) 94 98.6 °F (37 °C) (Oral) 18 5' 4\" (1.626 m) 176 lb (79.8 kg) LMP SpO2 BMI OB Status Smoking Status 09/09/2017 97% 30.21 kg/m2 Having regular periods Never Smoker Vitals History BMI and BSA Data Body Mass Index Body Surface Area  
 30.21 kg/m 2 1.9 m 2 Preferred Pharmacy Pharmacy Name Phone [id number] [address] [facility] [phone] Your Updated Medication List  
  
   
This list is accurate as of: 9/26/17 10:58 AM.  Always use your most recent med list.  
  
  
  
  
 ALPRAZolam 1 mg tablet Commonly known as:  Donata Fess Take 1 Tab by mouth daily as needed for Anxiety. * amphetamine-dextroamphetamine XR 30 mg XR capsule Commonly known as:  ADDERALL XR Take 1 Cap (30 mg total) by mouth every morningEarliest Fill Date: 9/26/17. Please fill on or after 11/25/17. * amphetamine-dextroamphetamine XR 30 mg XR capsule Commonly known as:  ADDERALL XR Take 1 Cap (30 mg total) by mouth every morningEarliest Fill Date: 9/26/17. Please fill on or after 10/26/17. Max Daily Amount: 30 mg  
  
 * amphetamine-dextroamphetamine XR 30 mg XR capsule Commonly known as:  ADDERALL XR Take 1 Cap (30 mg total) by mouth every morningEarliest Fill Date: 9/26/17. Max Daily Amount: 30 mg  
  
 buPROPion  mg XL tablet Commonly known as:  Delmy Humberto Take 1 Tab by mouth every morning. norethindrone-ethinyl estradiol 1-20 mg-mcg Tab Commonly known as:  JUNEL 1/20 (21) Take 1 Tab by mouth daily. pantoprazole 20 mg tablet Commonly known as:  PROTONIX Take 1 Tab by mouth daily. * Notice: This list has 3 medication(s) that are the same as other medications prescribed for you. Read the directions carefully, and ask your doctor or other care provider to review them with you. Prescriptions Printed Refills  
 amphetamine-dextroamphetamine XR (ADDERALL XR) 30 mg XR capsule 0 Sig: Take 1 Cap (30 mg total) by mouth every morningEarliest Fill Date: 9/26/17. Please fill on or after 11/25/17. Class: Print Route: Oral  
 amphetamine-dextroamphetamine XR (ADDERALL XR) 30 mg XR capsule 0 Sig: Take 1 Cap (30 mg total) by mouth every morningEarliest Fill Date: 9/26/17. Please fill on or after 10/26/17. Max Daily Amount: 30 mg  
 Class: Print Route: Oral  
 amphetamine-dextroamphetamine XR (ADDERALL XR) 30 mg XR capsule 0 Sig: Take 1 Cap (30 mg total) by mouth every morningEarliest Fill Date: 9/26/17. Max Daily Amount: 30 mg  
 Class: Print  Route: Oral  
 ALPRAZolam (XANAX) 1 mg tablet 2 Sig: Take 1 Tab by mouth daily as needed for Anxiety. Class: Print Route: Oral  
  
Prescriptions Sent to Pharmacy Refills  
 pantoprazole (PROTONIX) 20 mg tablet 2 Sig: Take 1 Tab by mouth daily. Class: Normal  
 Pharmacy: [facility] [address]  #: [phone] Route: Oral  
  
Follow-up Instructions Return in about 3 months (around 12/26/2017). Patient Instructions Try eating a high protein breakfast. I recommend EGGS since they are high in protein. Try eating 1 egg with 2 egg whites. That should keep you feeling nice and full all morning. You could also try nonfat greek yogurt with a small amount of the trail mix or granola. I recommend eating an apple as a mid morning snack. You can add a little bit of peanut butter or almond butter (less sweet option) for the extra protein. For lunch I recommend a protein like fish, chicken, turkey breast, etc. If you are going to have a sandwich try whole grain bread and start with half a sandwich to see if that satiates you. I think wraps (like spinach wraps) are better. You can put as many veggies on it as you want like onion, tomato, lettuce. Try mustard instead of butler because butler is high in fat. If you need a crunchy/salty side try pop chips or flo chips. They are less fattening that potato chips. For a mid afternoon snack I recommend carrots with humus. Humus is so good for you and so filling due to the high protein. For dinner stick with the protein like fish, chicken or turkey. For starch I recommend quinoa, brown rice, sweet potato, or cous cous. Always eat a vegetable with your dinner. Spinach and kale are great because they fill you up and are full of vitamins and minerals. I know that its difficult to stop craving sweets in the evening. For this I recommend purple or green grapes because they will curb your craving. Pineapple is another delicious alternative. Another idea is any of those \"100 calorie\" desserts like skinny cow. Just make sure you only have one. Drink as much water as you possibly can all day long. Try adding fresh slices of lemon to curb cravings. Also purchase nilda seeds. If you sprinkle them on your food, they \"blow up\" in the stomach and make you feel chang faster. I usually put in on yogurt or peanut butter toast. You can really put it on anything and you can find them pretty much everywhere these days. You should join [employer] at www.pinterest.com. They have an endless supply of healthy recipes. Get a fit bit to track your steps. Try getting 10,000 steps per day. Studies have shown that doing (High Intensity Interval Training) HIIT workouts 15 minutes per day most days of the week are more effective at helping facilitate weight loss versus other routine aerobic exercise. Www.Gurubooks.Emme E2MS Www.PreciouStatus.Emme E2MS. Introducing [facility] & HEALTH SERVICES! Dear Bonny Smith: Thank you for requesting a [email or web address] account. Our records indicate that you already have an active [email or web address] account. You can access your account anytime at [email or web address] Did you know that you can access your hospital and ER discharge instructions at any time in [email or web address]? You can also review all of your test results from your hospital stay or ER visit. Additional Information If you have questions, please visit the Frequently Asked Questions section of the [email or web address] website at [email or web address] Remember, [email or web address] is NOT to be used for urgent needs. For medical emergencies, dial 911. Now available from your iPhone and Android! Please provide this summary of care documentation to your next provider. Your primary care clinician is listed as Amanuel Georges. If you have any questions after today's visit, please call [phone].

## 2017-09-26 NOTE — PROGRESS NOTES
Chief Complaint   Patient presents with    Behavioral Problem     ADHD    Medication Evaluation    Heartburn    Weight Management     Patient in office today for med check; medication is doing well. Pt would like to discuss weight gain and weight management; pt goes to gym 3 times a week; and walks everyday. Heartburn has gotten worse in the past couple of weeks; have been treating with tums. 1. Have you been to the ER, urgent care clinic since your last visit? Hospitalized since your last visit? No    2. Have you seen or consulted any other health care providers outside of the [address] [place] [address] since your last visit? Include any pap smears or colon screening.  No

## 2017-09-26 NOTE — PATIENT INSTRUCTIONS
Try eating a high protein breakfast. I recommend EGGS since they are high in protein. Try eating 1 egg with 2 egg whites. That should keep you feeling nice and full all morning. You could also try nonfat greek yogurt with a small amount of the trail mix or granola. I recommend eating an apple as a mid morning snack. You can add a little bit of peanut butter or almond butter (less sweet option) for the extra protein. For lunch I recommend a protein like fish, chicken, turkey breast, etc. If you are going to have a sandwich try whole grain bread and start with half a sandwich to see if that satiates you. I think wraps (like spinach wraps) are better. You can put as many veggies on it as you want like onion, tomato, lettuce. Try mustard instead of butler because butler is high in fat. If you need a crunchy/salty side try pop chips or flo chips. They are less fattening that potato chips. For a mid afternoon snack I recommend carrots with humus. Humus is so good for you and so filling due to the high protein. For dinner stick with the protein like fish, chicken or turkey. For starch I recommend quinoa, brown rice, sweet potato, or cous cous. Always eat a vegetable with your dinner. Spinach and kale are great because they fill you up and are full of vitamins and minerals. I know that its difficult to stop craving sweets in the evening. For this I recommend purple or green grapes because they will curb your craving. Pineapple is another delicious alternative. Another idea is any of those \"100 calorie\" desserts like skinny cow. Just make sure you only have one. Drink as much water as you possibly can all day long. Try adding fresh slices of lemon to curb cravings. Also purchase nilda seeds. If you sprinkle them on your food, they \"blow up\" in the stomach and make you feel chang faster.  I usually put in on yogurt or peanut butter toast. You can really put it on anything and you can find them pretty much everywhere these days. You should join [employer] at www.pinterest.com. They have an endless supply of healthy recipes. Get a fit bit to track your steps. Try getting 10,000 steps per day. Studies have shown that doing (High Intensity Interval Training) HIIT workouts 15 minutes per day most days of the week are more effective at helping facilitate weight loss versus other routine aerobic exercise. Www.Snapsheetnesspal.com    Www.Naroomi.com.

## 2017-09-26 NOTE — PROGRESS NOTES
Chief Complaint   Patient presents with    Behavioral Problem     ADHD    Medication Evaluation    Heartburn    Weight Management     Patient in office today for med check; medication is doing well. Pt reports good concentration and focus. Denies any insomnia. Denies poor appetite. Denies any other associated SEs of medication. Pt would like to discuss weight gain and weight management; pt goes to gym 3 times a week; and walks everyday. Pt does treadmill and stair master at the gym. Eating less carbs. Has stopped drinking soda. Pt reports worsened anxiety. Feels like the klonopin doesn't work as well to help relieve her acute anxiety sx. Pt continues to take her wellbutrin daily. Has been seeing therapist once weekly or every 2 weeks. Feels like she would prefer to take the xanax versus the klonopin. Feels like she would only take it about 3 times a week. Now working more which is a good distraction. Heartburn has gotten worse in the past couple of weeks; have been treating with tums. Pt requesting something stronger for her heartburn. Worse over the last few weeks. Denies any new foods. Making it difficult to sleep at night. Feels the acid irritating her throat. Occurs daily. Denies any relief with taking TUMS. History of GERD being more problematic when she was pregnant. Denies any vomiting. Denies any constipation or diarrhea. Has irregular bowel tendencies. Denies any other concerns at this time. Chief Complaint   Patient presents with    Behavioral Problem     ADHD    Medication Evaluation    Heartburn    Weight Management     she is a 20y.o. year old female who presents for evalution. Reviewed PmHx, RxHx, FmHx, SocHx, AllgHx and updated and dated in the chart.     Review of Systems - negative except as listed above in the HPI    Objective:     Vitals:    09/26/17 1034   BP: 124/80   Pulse: 94   Resp: 18   Temp: 98.6 °F (37 °C)   TempSrc: Oral   SpO2: 97%   Weight: 176 lb (79.8 kg)   Height: 5' 4\" (1.626 m)     Physical Examination: General appearance - alert, well appearing, and in no distress  Mental status - normal mood, behavior, speech, dress, motor activity, and thought processes  Eyes - pupils equal and reactive, extraocular eye movements intact  Ears - bilateral TM's and external ear canals normal  Nose - normal and patent, no erythema, discharge or polyps and normal nontender sinuses  Mouth - mucous membranes moist, pharynx normal without lesions  Neck - supple, no significant adenopathy  Chest - clear to auscultation, no wheezes, rales or rhonchi, symmetric air entry  Heart - normal rate, regular rhythm, normal S1, S2, no murmurs    Assessment/ Plan:   Diagnoses and all orders for this visit:    1. ADHD (attention deficit hyperactivity disorder), combined type  -     amphetamine-dextroamphetamine XR (ADDERALL XR) 30 mg XR capsule; Take 1 Cap (30 mg total) by mouth every morningEarliest Fill Date: 9/26/17. Please fill on or after 11/25/17.  -     amphetamine-dextroamphetamine XR (ADDERALL XR) 30 mg XR capsule; Take 1 Cap (30 mg total) by mouth every morningEarliest Fill Date: 9/26/17. Please fill on or after 10/26/17. Max Daily Amount: 30 mg  -     amphetamine-dextroamphetamine XR (ADDERALL XR) 30 mg XR capsule; Take 1 Cap (30 mg total) by mouth every morningEarliest Fill Date: 9/26/17. Max Daily Amount: 30 mg  Refilled rx. Follow up in 3 months for next med refill. 2. Obesity (BMI 30.0-34. 9)  Enc pt to work harder on diet and lifestyle modification to facilitate weight loss. See pt instructions. 3. Gastroesophageal reflux disease without esophagitis  -     pantoprazole (PROTONIX) 20 mg tablet; Take 1 Tab by mouth daily. Start protonix daily. Reviewed SEs/ADRs of medication. Enc to avoid triggers and reviewed other supportive measures. 4. ROSALIO (generalized anxiety disorder)  -     ALPRAZolam (XANAX) 1 mg tablet;  Take 1 Tab by mouth daily as needed for Anxiety. Resume xanax daily as needed      Follow-up Disposition:  Return in about 3 months (around 12/26/2017). I have discussed the diagnosis with the patient and the intended plan as seen in the above orders. The patient has received an after-visit summary and questions were answered concerning future plans. Medication Side Effects and Warnings were discussed with patient: yes  Patient Labs were reviewed and or requested: no  Patient Past Records were reviewed and or requested  yes  Patient / Caregiver Understanding of treatment plan was verbalized during office visit Mendel Mais, FNP-C    Patient Instructions   Try eating a high protein breakfast. I recommend EGGS since they are high in protein. Try eating 1 egg with 2 egg whites. That should keep you feeling nice and full all morning. You could also try nonfat greek yogurt with a small amount of the trail mix or granola. I recommend eating an apple as a mid morning snack. You can add a little bit of peanut butter or almond butter (less sweet option) for the extra protein. For lunch I recommend a protein like fish, chicken, turkey breast, etc. If you are going to have a sandwich try whole grain bread and start with half a sandwich to see if that satiates you. I think wraps (like spinach wraps) are better. You can put as many veggies on it as you want like onion, tomato, lettuce. Try mustard instead of butler because butler is high in fat. If you need a crunchy/salty side try pop chips or flo chips. They are less fattening that potato chips. For a mid afternoon snack I recommend carrots with humus. Humus is so good for you and so filling due to the high protein. For dinner stick with the protein like fish, chicken or turkey. For starch I recommend quinoa, brown rice, sweet potato, or cous cous. Always eat a vegetable with your dinner.  Spinach and kale are great because they fill you up and are full of vitamins and minerals. I know that its difficult to stop craving sweets in the evening. For this I recommend purple or green grapes because they will curb your craving. Pineapple is another delicious alternative. Another idea is any of those \"100 calorie\" desserts like skinny cow. Just make sure you only have one. Drink as much water as you possibly can all day long. Try adding fresh slices of lemon to curb cravings. Also purchase nilda seeds. If you sprinkle them on your food, they \"blow up\" in the stomach and make you feel chang faster. I usually put in on yogurt or peanut butter toast. You can really put it on anything and you can find them pretty much everywhere these days. You should join [employer] at www.pinterest.com. They have an endless supply of healthy recipes. Get a fit bit to track your steps. Try getting 10,000 steps per day. Studies have shown that doing (High Intensity Interval Training) HIIT workouts 15 minutes per day most days of the week are more effective at helping facilitate weight loss versus other routine aerobic exercise. Www.myfitnesspal.com    Www.Conjecta.com.

## 2017-10-24 ENCOUNTER — OFFICE VISIT (OUTPATIENT)
Dept: OBGYN CLINIC | Age: 22
End: 2017-10-24

## 2017-10-24 VITALS
HEIGHT: 64 IN | SYSTOLIC BLOOD PRESSURE: 128 MMHG | DIASTOLIC BLOOD PRESSURE: 74 MMHG | RESPIRATION RATE: 16 BRPM | WEIGHT: 177 LBS | BODY MASS INDEX: 30.22 KG/M2

## 2017-10-24 DIAGNOSIS — Z01.419 ENCOUNTER FOR WELL WOMAN EXAM WITH ROUTINE GYNECOLOGICAL EXAM: Primary | ICD-10-CM

## 2017-10-24 DIAGNOSIS — Z20.2 POSSIBLE EXPOSURE TO STD: ICD-10-CM

## 2017-10-24 NOTE — MR AVS SNAPSHOT
Visit Information Date & Time Provider Department Dept. Phone Encounter #  
 10/24/2017  1:50 PM Zia Brandt MD [facility] [phone] [id number] Upcoming Health Maintenance Date Due  
 HPV AGE 9Y-34Y (1 of 3 - Female 3 Dose Series) 9/19/2005 PAP AKA CERVICAL CYTOLOGY 9/19/2015 INFLUENZA AGE 9 TO ADULT 8/1/2017 Allergies as of 10/24/2017  Review Complete On: 10/24/2017 By: Radha Reyna RN No Known Allergies Current Immunizations  Reviewed on 5/15/2014 Name Date Influenza Vaccine (Quad) PF 2/7/2017 Tdap 5/15/2014 Not reviewed this visit Vitals BP Resp Height(growth percentile) Weight(growth percentile) LMP BMI  
 128/74 (BP 1 Location: Right arm, BP Patient Position: Sitting) 16 5' 4\" (1.626 m) 177 lb (80.3 kg) 10/13/2017 (Exact Date) 30.38 kg/m2 OB Status Smoking Status Having regular periods Never Smoker BMI and BSA Data Body Mass Index Body Surface Area  
 30.38 kg/m 2 1.9 m 2 Preferred Pharmacy Pharmacy Name Phone [id number] [id number] [facility] [phone] Your Updated Medication List  
  
   
This list is accurate as of: 10/24/17  2:05 PM.  Always use your most recent med list.  
  
  
  
  
 ALPRAZolam 1 mg tablet Commonly known as:  Wagner Beecham Take 1 Tab by mouth daily as needed for Anxiety. * amphetamine-dextroamphetamine XR 30 mg XR capsule Commonly known as:  ADDERALL XR Take 1 Cap (30 mg total) by mouth every morningEarliest Fill Date: 9/26/17. Please fill on or after 11/25/17. * amphetamine-dextroamphetamine XR 30 mg XR capsule Commonly known as:  ADDERALL XR Take 1 Cap (30 mg total) by mouth every morningEarliest Fill Date: 9/26/17. Please fill on or after 10/26/17. Max Daily Amount: 30 mg  
  
 * amphetamine-dextroamphetamine XR 30 mg XR capsule Commonly known as:  ADDERALL XR  
 Take 1 Cap (30 mg total) by mouth every morningEarliest Fill Date: 9/26/17. Max Daily Amount: 30 mg  
  
 buPROPion  mg XL tablet Commonly known as:  Johnice Blank Take 1 Tab by mouth every morning. norethindrone-ethinyl estradiol 1-20 mg-mcg Tab Commonly known as:  JUNEL 1/20 (21) Take 1 Tab by mouth daily. pantoprazole 20 mg tablet Commonly known as:  PROTONIX Take 1 Tab by mouth daily. * Notice: This list has 3 medication(s) that are the same as other medications prescribed for you. Read the directions carefully, and ask your doctor or other care provider to review them with you. Patient Instructions Pelvic Exam: Care Instructions Your Care Instructions When your doctor examines all of your pelvic organs, it's called a pelvic exam. Two good reasons to have this kind of exam are to check for sexually transmitted infections (STIs) and to get a Pap test. A Pap test is also called a Pap smear. It checks for early changes that can lead to cancer of the cervix. Sometimes a pelvic exam is part of a regular checkup. In this case, you can do some things to make your test results as accurate as possible. · Try to schedule the exam when you don't have your period. · Don't use douches, tampons, or vaginal medicines, sprays, or powders for 24 hours before your exam. 
· Don't have sex for 24 hours before your exam. 
Other times, women have this kind of exam at any time of the month. This is because they have pelvic pain, bleeding, or discharge. Or they may have another pelvic problem. Before your exam, it's important to share some information with your doctor. For example, if you are a survivor of rape or sexual abuse, you can talk about any concerns you may have. Your doctor will also want to know if you are pregnant or use birth control.  And he or she will want to hear about any problems, surgeries, or procedures you have had in your pelvic area. You will also need to tell your doctor when your last period was. Follow-up care is a key part of your treatment and safety. Be sure to make and go to all appointments, and call your doctor if you are having problems. It's also a good idea to know your test results and keep a list of the medicines you take. How is a pelvic exam done? · During a pelvic exam, you will: ¨ Take off your clothes below the waist. You will get a paper or cloth cover to put over the lower half of your body. Duayne Lark on your back on an exam table. Your feet will be raised above you. Stirrups will support your feet. · The doctor will: ¨ Ask you to relax your knees. Your knees need to lean out, toward the walls. ¨ Check the opening of your vagina for sores or swelling. ¨ Gently put a tool called a speculum into your vagina. It opens the vagina a little bit. You will feel some pressure. But if you are relaxed, it will not hurt. It lets your doctor see inside the vagina. ¨ Use a small brush, spatula, or swab to get a sample of cells, if you are having a Pap test or culture. The doctor then removes the speculum. ¨ Put on gloves and put one or two fingers of one hand into your vagina. The other hand goes on your lower belly. This lets your doctor feel your pelvic organs. You will probably feel some pressure. Try to stay relaxed. ¨ Put one gloved finger into your rectum and one into your vagina, if needed. This can also help check your pelvic organs. This exam takes about 10 minutes. At the end, you will get a washcloth or tissue to clean your vaginal area. It's normal to have some discharge after this exam. You can then get dressed. Some test results may be ready right away. But results from a culture or a Pap test may take several days or a few weeks. Why should you have a pelvic exam? 
· You want to have recommended screening tests.  This includes a Pap test. 
 · You think you have a vaginal infection. Signs include itching, burning, or unusual discharge. · You might have been exposed to a sexually transmitted infection (STI), such as chlamydia or herpes. · You have vaginal bleeding that is not part of your normal menstrual period. · You have pain in your belly or pelvis. · You have been sexually assaulted. A pelvic exam lets your doctor collect evidence and check for STIs. · You are pregnant. · You are having trouble getting pregnant. What are the risks of a pelvic exam? 
There are no risks from a pelvic exam. 
When should you call for help? Watch closely for changes in your health, and be sure to contact your doctor if: 
· You have heavy bleeding or discharge from your vagina after the exam. 
Where can you learn more? Go to http://luly-donavon.info/. Enter O113 in the search box to learn more about \"Pelvic Exam: Care Instructions. \" Current as of: October 13, 2016 Content Version: 11.3 © 6494-0902 [employer]. Care instructions adapted under license by [employer] (which disclaims liability or warranty for this information). If you have questions about a medical condition or this instruction, always ask your healthcare professional. [place] [address] any warranty or liability for your use of this information. Introducing hospitals & HEALTH SERVICES! Dear Arian Vasquez: Thank you for requesting a [employer] account. Our records indicate that you already have an active [employer] account. You can access your account anytime at [email or web address] Did you know that you can access your hospital and ER discharge instructions at any time in [employer]? You can also review all of your test results from your hospital stay or ER visit. Additional Information If you have questions, please visit the Frequently Asked Questions section of the [employer] website at [email or web address] Remember, [employer] is NOT to be used for urgent needs. For medical emergencies, dial 911. Now available from your iPhone and Android! Please provide this summary of care documentation to your next provider. Your primary care clinician is listed as Amanuel Georges. If you have any questions after today's visit, please call [phone].

## 2017-10-24 NOTE — PATIENT INSTRUCTIONS
Pelvic Exam: Care Instructions  Your Care Instructions    When your doctor examines all of your pelvic organs, it's called a pelvic exam. Two good reasons to have this kind of exam are to check for sexually transmitted infections (STIs) and to get a Pap test. A Pap test is also called a Pap smear. It checks for early changes that can lead to cancer of the cervix. Sometimes a pelvic exam is part of a regular checkup. In this case, you can do some things to make your test results as accurate as possible. · Try to schedule the exam when you don't have your period. · Don't use douches, tampons, or vaginal medicines, sprays, or powders for 24 hours before your exam.  · Don't have sex for 24 hours before your exam.  Other times, women have this kind of exam at any time of the month. This is because they have pelvic pain, bleeding, or discharge. Or they may have another pelvic problem. Before your exam, it's important to share some information with your doctor. For example, if you are a survivor of rape or sexual abuse, you can talk about any concerns you may have. Your doctor will also want to know if you are pregnant or use birth control. And he or she will want to hear about any problems, surgeries, or procedures you have had in your pelvic area. You will also need to tell your doctor when your last period was. Follow-up care is a key part of your treatment and safety. Be sure to make and go to all appointments, and call your doctor if you are having problems. It's also a good idea to know your test results and keep a list of the medicines you take. How is a pelvic exam done? · During a pelvic exam, you will:  ¨ Take off your clothes below the waist. You will get a paper or cloth cover to put over the lower half of your body. Rufus  on your back on an exam table. Your feet will be raised above you. Stirrups will support your feet. · The doctor will:  Pavel Confer you to relax your knees.  Your knees need to lean out, toward the walls. ¨ Check the opening of your vagina for sores or swelling. ¨ Gently put a tool called a speculum into your vagina. It opens the vagina a little bit. You will feel some pressure. But if you are relaxed, it will not hurt. It lets your doctor see inside the vagina. ¨ Use a small brush, spatula, or swab to get a sample of cells, if you are having a Pap test or culture. The doctor then removes the speculum. ¨ Put on gloves and put one or two fingers of one hand into your vagina. The other hand goes on your lower belly. This lets your doctor feel your pelvic organs. You will probably feel some pressure. Try to stay relaxed. ¨ Put one gloved finger into your rectum and one into your vagina, if needed. This can also help check your pelvic organs. This exam takes about 10 minutes. At the end, you will get a washcloth or tissue to clean your vaginal area. It's normal to have some discharge after this exam. You can then get dressed. Some test results may be ready right away. But results from a culture or a Pap test may take several days or a few weeks. Why should you have a pelvic exam?  · You want to have recommended screening tests. This includes a Pap test.  · You think you have a vaginal infection. Signs include itching, burning, or unusual discharge. · You might have been exposed to a sexually transmitted infection (STI), such as chlamydia or herpes. · You have vaginal bleeding that is not part of your normal menstrual period. · You have pain in your belly or pelvis. · You have been sexually assaulted. A pelvic exam lets your doctor collect evidence and check for STIs. · You are pregnant. · You are having trouble getting pregnant. What are the risks of a pelvic exam?  There are no risks from a pelvic exam.  When should you call for help?   Watch closely for changes in your health, and be sure to contact your doctor if:  · You have heavy bleeding or discharge from your vagina after the exam.  Where can you learn more? Go to http://luly-donavon.info/. Enter X873 in the search box to learn more about \"Pelvic Exam: Care Instructions. \"  Current as of: October 13, 2016  Content Version: 11.3  © 8954-7373 [employer]. Care instructions adapted under license by [employer] (which disclaims liability or warranty for this information). If you have questions about a medical condition or this instruction, always ask your healthcare professional. [address] any warranty or liability for your use of this information.

## 2017-10-24 NOTE — PROGRESS NOTES
Annual exam ages 20-44    Nikki Hamlin is a ,  20 y.o. female [facility] Patient's last menstrual period was 10/13/2017 (exact date). .    She presents for her annual checkup. She is having no significant problems. With regard to the Gardasil vaccine, she declines to receive it. Menstrual status:    Her periods are moderate in flow. She is using three to five pads or tampons per day, usually regular and last 26-30 days. She denies dysmenorrhea. She reports no premenstrual symptoms. Contraception:    The current method of family planning is OCP (estrogen/progesterone). Sexual history:     She  reports that she currently engages in sexual activity and has had male partners. She reports using the following method of birth control/protection: IUD. [place] Medical conditions:    Since her last annual GYN exam about three or more years ago, she has not the following changes in her health history: none. Pap and Mammogram History:    Her most recent Pap smear was: none. The patient has never had a mammogram.    Breast Cancer History/Substance Abuse: negative    Past Medical History:   Diagnosis Date    Excessive or frequent menstruation 13    Hx of abnormal Pap smear 2013    ASCUS +HPV    IUD (intrauterine device) in place 10/24/14    Mirena placed    Need for prophylactic vaccination and inoculation against other viral diseases(V04.89)     Gardasil injections completed all 3 previously.  Ruptured ovarian cyst      Past Surgical History:   Procedure Laterality Date    HX  SECTION  14    HX DILATION AND CURETTAGE  10/2016       Current Outpatient Prescriptions   Medication Sig Dispense Refill    amphetamine-dextroamphetamine XR (ADDERALL XR) 30 mg XR capsule Take 1 Cap (30 mg total) by mouth every morningEarliest Fill Date: 17.   Please fill on or after 17. 30 Cap 0    amphetamine-dextroamphetamine XR (ADDERALL XR) 30 mg XR capsule Take 1 Cap (30 mg total) by mouth every morningEarliest Fill Date: 9/26/17. Please fill on or after 10/26/17. Max Daily Amount: 30 mg 30 Cap 0    amphetamine-dextroamphetamine XR (ADDERALL XR) 30 mg XR capsule Take 1 Cap (30 mg total) by mouth every morningEarliest Fill Date: 9/26/17. Max Daily Amount: 30 mg 30 Cap 0    pantoprazole (PROTONIX) 20 mg tablet Take 1 Tab by mouth daily. 30 Tab 2    norethindrone-ethinyl estradiol (JUNEL 1/20, 21,) 1-20 mg-mcg tab Take 1 Tab by mouth daily. 1 Package 11    buPROPion XL (WELLBUTRIN XL) 300 mg XL tablet Take 1 Tab by mouth every morning. 30 Tab 2    ALPRAZolam (XANAX) 1 mg tablet Take 1 Tab by mouth daily as needed for Anxiety. 15 Tab 2     Allergies: Review of patient's allergies indicates no known allergies. Tobacco History:  reports that she has never smoked. She has never used smokeless tobacco.  Alcohol Abuse:  reports that she does not drink alcohol. Drug Abuse:  reports that she does not use illicit drugs.     Family Medical/Cancer History:   Family History   Problem Relation Age of Onset    Hypertension Father     Hypertension Mother         Review of Systems - History obtained from the patient  Constitutional: negative for weight loss, fever, night sweats  HEENT: negative for hearing loss, earache, congestion, snoring, sorethroat  CV: negative for chest pain, palpitations, edema  Resp: negative for cough, shortness of breath, wheezing  GI: negative for change in bowel habits, abdominal pain, black or bloody stools  : negative for frequency, dysuria, hematuria, vaginal discharge  MSK: negative for back pain, joint pain, muscle pain  Breast: negative for breast lumps, nipple discharge, galactorrhea  Skin :negative for itching, rash, hives  Neuro: negative for dizziness, headache, confusion, weakness  Psych: negative for anxiety, depression, change in mood  Heme/lymph: negative for bleeding, bruising, pallor    Physical Exam    Visit Vitals    /74 (BP 1 Location: Right arm, BP Patient Position: Sitting)    Resp 16    Ht 5' 4\" (1.626 m)    Wt 177 lb (80.3 kg)    LMP 10/13/2017 (Exact Date)    BMI 30.38 kg/m2       Constitutional  · Appearance: well-nourished, well developed, alert, in no acute distress    HENT  · Head and Face: appears normal    Neck  · Inspection/Palpation: normal appearance, no masses or tenderness  · Lymph Nodes: no lymphadenopathy present  · Thyroid: gland size normal, nontender, no nodules or masses present on palpation    Chest  · Respiratory Effort: breathing unlabored    Breasts  · Inspection of Breasts: breasts symmetrical, no skin changes, no discharge present, nipple appearance normal, no skin retraction present  · Palpation of Breasts and Axillae: no masses present on palpation, no breast tenderness  · Axillary Lymph Nodes: no lymphadenopathy present    Gastrointestinal  · Abdominal Examination: abdomen non-tender to palpation, normal bowel sounds, no masses present  · Liver and spleen: no hepatomegaly present, spleen not palpable  · Hernias: no hernias identified    Genitourinary  · External Genitalia: normal appearance for age, no discharge present, no tenderness present, no inflammatory lesions present, no masses present, no atrophy present  · Vagina: normal vaginal vault without central or paravaginal defects, no discharge present, no inflammatory lesions present, no masses present  · Bladder: non-tender to palpation  · Urethra: appears normal  · Cervix: normal   · Uterus: normal size, shape and consistency  · Adnexa: no adnexal tenderness present, no adnexal masses present  · Perineum: perineum within normal limits, no evidence of trauma, no rashes or skin lesions present  · Anus: anus within normal limits, no hemorrhoids present  · Inguinal Lymph Nodes: no lymphadenopathy present    Skin  · General Inspection: no rash, no lesions identified    Neurologic/Psychiatric  · Mental Status:  · Orientation: grossly oriented to person, place and time  · Mood and Affect: mood normal, affect appropriate    Assessment:  Routine gynecologic examination  Her current medical status is satisfactory with no evidence of significant gynecologic issues. Plan:  Counseled re: diet, exercise, healthy lifestyle  Return for yearly wellness visits  Discussed benefits of iud for menstrual suppression/cycle control. Discussed with pt risks associated especially in light of previous perforation for IUD. Pt reports she understands risks and would like to proceed with placement. Strong FH of breast cancer (mother age 27 and aunt)- offered referral for BRCA counseling/testing. Reports she will talk to her aunt first to see if she was tested (not in contact with her mom).

## 2017-10-30 LAB
C TRACH RRNA CVX QL NAA+PROBE: NEGATIVE
CYTOLOGIST CVX/VAG CYTO: NORMAL
CYTOLOGY CVX/VAG DOC THIN PREP: NORMAL
CYTOLOGY HISTORY:: NORMAL
DX ICD CODE: NORMAL
LABCORP, 190119: NORMAL
Lab: NORMAL
Lab: NORMAL
N GONORRHOEA RRNA CVX QL NAA+PROBE: NEGATIVE
OTHER STN SPEC: NORMAL
PATH REPORT.FINAL DX SPEC: NORMAL
STAT OF ADQ CVX/VAG CYTO-IMP: NORMAL
T VAGINALIS RRNA SPEC QL NAA+PROBE: NEGATIVE

## 2017-11-21 ENCOUNTER — OFFICE VISIT (OUTPATIENT)
Dept: OBGYN CLINIC | Age: 22
End: 2017-11-21

## 2017-11-21 VITALS
HEIGHT: 64 IN | BODY MASS INDEX: 24.59 KG/M2 | DIASTOLIC BLOOD PRESSURE: 75 MMHG | SYSTOLIC BLOOD PRESSURE: 118 MMHG | WEIGHT: 144 LBS | RESPIRATION RATE: 16 BRPM

## 2017-11-21 DIAGNOSIS — Z30.430 ENCOUNTER FOR INSERTION OF MIRENA IUD: Primary | ICD-10-CM

## 2017-11-21 LAB
HCG URINE, QL. (POC): NEGATIVE
VALID INTERNAL CONTROL?: YES

## 2017-11-21 NOTE — MR AVS SNAPSHOT
Visit Information Date & Time Provider Department Dept. Phone Encounter #  
 11/21/2017  9:00 AM Christina Johns [phone] [id number] Upcoming Health Maintenance Date Due  
 HPV AGE 9Y-34Y (1 of 3 - Female 3 Dose Series) 9/19/2005 Influenza Age 5 to Adult 8/1/2017 PAP AKA CERVICAL CYTOLOGY 10/24/2020 Allergies as of 11/21/2017  Review Complete On: 11/21/2017 By: Valerie Fink RN No Known Allergies Current Immunizations  Reviewed on 5/15/2014 Name Date Influenza Vaccine (Quad) PF 2/7/2017 Tdap 5/15/2014 Not reviewed this visit You Were Diagnosed With   
  
 Codes Comments Encounter for insertion of mirena IUD    -  Primary ICD-10-CM: Z30.430 ICD-9-CM: V25.11 Vitals BP Resp Height(growth percentile) Weight(growth percentile) LMP BMI  
 118/75 (BP 1 Location: Right arm, BP Patient Position: Sitting) 16 5' 4\" (1.626 m) 144 lb (65.3 kg) 10/03/2017 (Approximate) 24.72 kg/m2 OB Status Smoking Status Having regular periods Never Smoker BMI and BSA Data Body Mass Index Body Surface Area 24.72 kg/m 2 1.72 m 2 Preferred Pharmacy Pharmacy Name Phone [id number], [address] [phone] Your Updated Medication List  
  
   
This list is accurate as of: 11/21/17  9:32 AM.  Always use your most recent med list.  
  
  
  
  
 ALPRAZolam 1 mg tablet Commonly known as:  [place] Kill Take 1 Tab by mouth daily as needed for Anxiety. * amphetamine-dextroamphetamine XR 30 mg XR capsule Commonly known as:  ADDERALL XR Take 1 Cap (30 mg total) by mouth every morningEarliest Fill Date: 9/26/17. Please fill on or after 11/25/17. * amphetamine-dextroamphetamine XR 30 mg XR capsule Commonly known as:  ADDERALL XR Take 1 Cap (30 mg total) by mouth every morningEarliest Fill Date: 9/26/17. Please fill on or after 10/26/17. Max Daily Amount: 30 mg  
  
 * amphetamine-dextroamphetamine XR 30 mg XR capsule Commonly known as:  ADDERALL XR Take 1 Cap (30 mg total) by mouth every morningEarliest Fill Date: 9/26/17. Max Daily Amount: 30 mg  
  
 buPROPion  mg XL tablet Commonly known as:  Ayah Ferrkenji Take 1 Tab by mouth every morning. norethindrone-ethinyl estradiol 1-20 mg-mcg Tab Commonly known as:  JUNEL 1/20 (21) Take 1 Tab by mouth daily. pantoprazole 20 mg tablet Commonly known as:  PROTONIX Take 1 Tab by mouth daily. * Notice: This list has 3 medication(s) that are the same as other medications prescribed for you. Read the directions carefully, and ask your doctor or other care provider to review them with you. We Performed the Following AMB POC URINE PREGNANCY TEST, VISUAL COLOR COMPARISON [03596 CPT(R)] INSERT INTRAUTERINE DEVICE [id number] CPT(R)] IA MIRENA, 52 MG [id number] [facility] [place]] Patient Instructions Intrauterine Device (IUD) Insertion: Care Instructions Your Care Instructions The intrauterine device (IUD) is a very effective method of birth control. It is a small, plastic, T-shaped device that contains copper or hormones. The doctor inserts the IUD into your uterus. A plastic string tied to the end of the IUD hangs down through the cervix into the vagina. There are two types of IUDs. The copper IUD is effective for up to 10 years. The hormonal IUD is effective for either 3 years or 5 years, depending on which IUD is used. The hormonal IUD also reduces menstrual bleeding and cramping. Both types of IUD damage or kill the man's sperm. This means that the woman's egg does not join with the sperm. IUDs also change the lining of the uterus so that the egg does not lodge there.  
The IUD is most likely to work well for women who have been pregnant before. Some women who have never been pregnant have more trouble keeping the IUD in the uterus. They also may have more pain and cramping after insertion. Follow-up care is a key part of your treatment and safety. Be sure to make and go to all appointments, and call your doctor if you are having problems. It's also a good idea to know your test results and keep a list of the medicines you take. How can you care for yourself at home? · You may experience some mild cramping and light bleeding (spotting) for 1 or 2 days. Use a hot water bottle or a heating pad set on low on your belly for pain. · Take an over-the-counter pain medicine, such as acetaminophen (Tylenol), ibuprofen (Advil, Motrin), and naproxen (Aleve) if needed. Read and follow all instructions on the label. · Do not take two or more pain medicines at the same time unless the doctor told you to. Many pain medicines have acetaminophen, which is Tylenol. Too much acetaminophen (Tylenol) can be harmful. · Check the string of your IUD after every period. To do this, insert a finger into your vagina and feel for the cervix, which is at the top of the vagina and feels harder than the rest of your vagina. You should be able to feel the thin, plastic string coming out of the opening of your cervix. If you cannot feel the string, use another form of birth control and make an appointment with your doctor to have the string checked. · If the IUD comes out, save it and call your doctor. Be sure to use another form of birth control while the IUD is out. · Use latex condoms to protect against sexually transmitted infections (STIs), such as gonorrhea and chlamydia. An IUD does not protect you from STIs. Having one sex partner (who does not have STIs and does not have sex with anyone else) is a good way to avoid STIs. When should you call for help? Call 911 anytime you think you may need emergency care. For example, call if: ? · You passed out (lost consciousness). ? · You have sudden, severe pain in your belly or pelvis. ?Call your doctor now or seek immediate medical care if: 
? · You have new belly or pelvic pain. ? · You have severe vaginal bleeding. This means that you are soaking through your usual pads or tampons each hour for 2 or more hours. ? · You are dizzy or lightheaded, or you feel like you may faint. ? · You have a fever and pelvic pain or vaginal discharge. ? · You have pelvic pain that is getting worse. ? Watch closely for changes in your health, and be sure to contact your doctor if: 
? · You cannot feel the string, or the IUD comes out. ? · You feel sick to your stomach, or you vomit. ? · You think you may be pregnant. Where can you learn more? Go to http://luly-donavon.info/. Enter K936 in the search box to learn more about \"Intrauterine Device (IUD) Insertion: Care Instructions. \" Current as of: March 16, 2017 Content Version: 11.4 © 3047-7358 [employer]. Care instructions adapted under license by [employer] (which disclaims liability or warranty for this information). If you have questions about a medical condition or this instruction, always ask your healthcare professional. [address] any warranty or liability for your use of this information. Introducing [facility]! Dear Analisa Culp: Thank you for requesting a [employer] account. Our records indicate that you already have an active [employer] account. You can access your account anytime at [email or web address] Did you know that you can access your hospital and ER discharge instructions at any time in [employer]? You can also review all of your test results from your hospital stay or ER visit. Additional Information If you have questions, please visit the Frequently Asked Questions section of the [employer] website at [email or web address] Remember, [employer] is NOT to be used for urgent needs. For medical emergencies, dial 911. Now available from your iPhone and Android! Please provide this summary of care documentation to your next provider. Your primary care clinician is listed as Amanuel Georges. If you have any questions after today's visit, please call [phone].

## 2017-11-21 NOTE — PATIENT INSTRUCTIONS
Intrauterine Device (IUD) Insertion: Care Instructions  Your Care Instructions    The intrauterine device (IUD) is a very effective method of birth control. It is a small, plastic, T-shaped device that contains copper or hormones. The doctor inserts the IUD into your uterus. A plastic string tied to the end of the IUD hangs down through the cervix into the vagina. There are two types of IUDs. The copper IUD is effective for up to 10 years. The hormonal IUD is effective for either 3 years or 5 years, depending on which IUD is used. The hormonal IUD also reduces menstrual bleeding and cramping. Both types of IUD damage or kill the man's sperm. This means that the woman's egg does not join with the sperm. IUDs also change the lining of the uterus so that the egg does not lodge there. The IUD is most likely to work well for women who have been pregnant before. Some women who have never been pregnant have more trouble keeping the IUD in the uterus. They also may have more pain and cramping after insertion. Follow-up care is a key part of your treatment and safety. Be sure to make and go to all appointments, and call your doctor if you are having problems. It's also a good idea to know your test results and keep a list of the medicines you take. How can you care for yourself at home? · You may experience some mild cramping and light bleeding (spotting) for 1 or 2 days. Use a hot water bottle or a heating pad set on low on your belly for pain. · Take an over-the-counter pain medicine, such as acetaminophen (Tylenol), ibuprofen (Advil, Motrin), and naproxen (Aleve) if needed. Read and follow all instructions on the label. · Do not take two or more pain medicines at the same time unless the doctor told you to. Many pain medicines have acetaminophen, which is Tylenol. Too much acetaminophen (Tylenol) can be harmful. · Check the string of your IUD after every period.  To do this, insert a finger into your vagina and feel for the cervix, which is at the top of the vagina and feels harder than the rest of your vagina. You should be able to feel the thin, plastic string coming out of the opening of your cervix. If you cannot feel the string, use another form of birth control and make an appointment with your doctor to have the string checked. · If the IUD comes out, save it and call your doctor. Be sure to use another form of birth control while the IUD is out. · Use latex condoms to protect against sexually transmitted infections (STIs), such as gonorrhea and chlamydia. An IUD does not protect you from STIs. Having one sex partner (who does not have STIs and does not have sex with anyone else) is a good way to avoid STIs. When should you call for help? Call 911 anytime you think you may need emergency care. For example, call if:  ? · You passed out (lost consciousness). ? · You have sudden, severe pain in your belly or pelvis. ?Call your doctor now or seek immediate medical care if:  ? · You have new belly or pelvic pain. ? · You have severe vaginal bleeding. This means that you are soaking through your usual pads or tampons each hour for 2 or more hours. ? · You are dizzy or lightheaded, or you feel like you may faint. ? · You have a fever and pelvic pain or vaginal discharge. ? · You have pelvic pain that is getting worse. ? Watch closely for changes in your health, and be sure to contact your doctor if:  ? · You cannot feel the string, or the IUD comes out. ? · You feel sick to your stomach, or you vomit. ? · You think you may be pregnant. Where can you learn more? Go to http://luly-donavon.info/. Enter R123 in the search box to learn more about \"Intrauterine Device (IUD) Insertion: Care Instructions. \"  Current as of: March 16, 2017  Content Version: 11.4  © 8774-9216 [employer].  Care instructions adapted under license by [employer] (which disclaims liability or warranty for this information). If you have questions about a medical condition or this instruction, always ask your healthcare professional. [place] [address] any warranty or liability for your use of this information.

## 2017-11-21 NOTE — PROGRESS NOTES
[facility]  OFFICE PROCEDURE PROGRESS NOTE        Chart reviewed for the following:   Nikos Prince RN, have reviewed the History, Physical and updated the Allergic reactions for Manan Dinh Urrutia 1160 performed immediately prior to start of procedure:   Nikos Prince RN, have performed the following reviews on Jose Griffin prior to the start of the procedure:            * Patient was identified by name and date of birth   * Agreement on procedure being performed was verified  * Risks and Benefits explained to the patient  * Procedure site verified and marked as necessary  * Patient was positioned for comfort  * Consent was signed and verified     Time: 930am      Date of procedure: 2017    Procedure performed by:  Naresh Flood MD    Provider assisted by: Erik Sunshine RN    Patient assisted by: self    How tolerated by patient: tolerated the procedure well with no complications    Post Procedural Pain Scale: 2 - Hurts Little Bit    Comments: none      -----------------------------------IUD INSERTION----------------------------------------- Indications:  Jose Griffin is a ,  20 y.o. female [place] whose Patient's last menstrual period was 10/03/2017 (approximate). was on 10/3/2017 and was normal in duration and amount of flow. who presents for insertion of an IUD. The risks, benefits and alternatives of IUD insertion were discussed in detail at last visit. She also has reviewed Mirena information. She has elected to proceed with the insertion today and she states she has no further questions. A urine pregnancy test was negative No components found for: SPEP, UPEP    Procedure: The pelvic exam revealed normal external genitalia. On bimanual exam the uterus was anteverted and normal in size with no tenderness present. A speculum was inserted into the vagina and the cervix was visualized. The cervix was prepped with zephiran solution.  The anterior lip of the cervix was grasped with a single toothed tenaculum. The uterus was sounded with a Ortiz sound to 7 centimeters. A Mirena was then inserted without difficulty. The string was cut to 3 centimeters. She experienced a mild  amount of cramping. Post Procedure Status: She tolerated the procedure with mild discomfort. The patient was observed for 10 minutes after the insertion. There were no complications. Patient was discharged in stable condition. The patient received Mirena lot number [id number]   Exp 06/20.

## 2017-11-24 ENCOUNTER — OFFICE VISIT (OUTPATIENT)
Dept: FAMILY MEDICINE CLINIC | Age: 22
End: 2017-11-24

## 2017-11-24 VITALS
HEART RATE: 78 BPM | BODY MASS INDEX: 29.5 KG/M2 | RESPIRATION RATE: 18 BRPM | OXYGEN SATURATION: 98 % | SYSTOLIC BLOOD PRESSURE: 118 MMHG | HEIGHT: 64 IN | TEMPERATURE: 98.7 F | WEIGHT: 172.8 LBS | DIASTOLIC BLOOD PRESSURE: 78 MMHG

## 2017-11-24 DIAGNOSIS — J06.9 UPPER RESPIRATORY TRACT INFECTION, UNSPECIFIED TYPE: Primary | ICD-10-CM

## 2017-11-24 RX ORDER — BENZONATATE 100 MG/1
100 CAPSULE ORAL
Qty: 60 CAP | Refills: 1 | Status: SHIPPED | OUTPATIENT
Start: 2017-11-24 | End: 2017-12-01

## 2017-11-24 RX ORDER — FLUCONAZOLE 150 MG/1
150 TABLET ORAL DAILY
Qty: 1 TAB | Refills: 5 | Status: SHIPPED | OUTPATIENT
Start: 2017-11-24 | End: 2017-11-25

## 2017-11-24 RX ORDER — LORATADINE 10 MG/1
10 TABLET ORAL DAILY
Qty: 30 TAB | Refills: 11 | Status: SHIPPED | OUTPATIENT
Start: 2017-11-24 | End: 2018-08-20

## 2017-11-24 RX ORDER — AZITHROMYCIN 250 MG/1
TABLET, FILM COATED ORAL
Qty: 6 TAB | Refills: 0 | Status: SHIPPED | OUTPATIENT
Start: 2017-11-24 | End: 2018-01-03

## 2017-11-24 NOTE — PROGRESS NOTES
Pt c/o sinus congestion, right ear pain for past two weeks     Cough and worse at night, yellow dc      No chief complaint on file. She is a 20 y.o. female who presents for evalution. Reviewed PmHx, RxHx, FmHx, SocHx, AllgHx and updated and dated in the chart. Patient Active Problem List    Diagnosis    Mild preeclampsia delivered    S/P  section    Term  delivered by  section, current hospitalization       Review of Systems - negative except as listed above in the HPI    Objective:     Vitals:    17 1132   BP: 118/78   Pulse: 78   Resp: 18   Temp: 98.7 °F (37.1 °C)   TempSrc: Oral   SpO2: 98%   Weight: 172 lb 12.8 oz (78.4 kg)   Height: 5' 4\" (1.626 m)     Physical Examination: General appearance - alert, well appearing, and in no distress  Nose - normal and patent, no erythema, discharge or polyps  Mouth - erythematous  Neck - supple, no significant adenopathy  Chest - clear to auscultation, no wheezes, rales or rhonchi, symmetric air entry  Heart - normal rate, regular rhythm, normal S1, S2, no murmurs, rubs, clicks or gallops    Assessment/ Plan:   Diagnoses and all orders for this visit:    1. Upper respiratory tract infection, unspecified type  -     azithromycin (ZITHROMAX) 250 mg tablet; Take two tablets today then one tablet daily  -     loratadine (CLARITIN) 10 mg tablet; Take 1 Tab by mouth daily for 360 days. -     benzonatate (TESSALON PERLES) 100 mg capsule; Take 1 Cap by mouth three (3) times daily as needed for Cough for up to 7 days. -     fluconazole (DIFLUCAN) 150 mg tablet; Take 1 Tab by mouth daily for 1 day. Follow-up Disposition:  Return if symptoms worsen or fail to improve. I have discussed the diagnosis with the patient and the intended plan as seen in the above orders. The patient understands and agrees with the plan. The patient has received an after-visit summary and questions were answered concerning future plans.      Medication Side Effects and Warnings were discussed with patient  Patient Labs were reviewed and or requested:  Patient Past Records were reviewed and or requested    Alejandra DEVON Park. There are no Patient Instructions on file for this visit.

## 2017-11-24 NOTE — MR AVS SNAPSHOT
Visit Information Date & Time Provider Department Dept. Phone Encounter #  
 11/24/2017 11:30 AM Esther Swan MD [address] [facility] [phone] [id number] Follow-up Instructions Return if symptoms worsen or fail to improve. Your Appointments 12/20/2017  9:30 AM  
ESTABLISHED PATIENT with MD Srinivas Washington ([facility]) Appt Note: 4wk iud ck [address] [facility] [address] [place] [address] [phone]  
[facility] 31 [address] Upcoming Health Maintenance Date Due  
 HPV AGE 9Y-34Y (1 of 3 - Female 3 Dose Series) 9/19/2005 Influenza Age 5 to Adult 8/1/2017 PAP AKA CERVICAL CYTOLOGY 10/24/2020 DTaP/Tdap/Td series (2 - Td) 5/15/2024 Allergies as of 11/24/2017  Review Complete On: 11/24/2017 By: Esther Swan MD  
 No Known Allergies Current Immunizations  Reviewed on 5/15/2014 Name Date Influenza Vaccine (Quad) PF 2/7/2017 Tdap 5/15/2014 Not reviewed this visit You Were Diagnosed With   
  
 Codes Comments Upper respiratory tract infection, unspecified type    -  Primary ICD-10-CM: J06.9 ICD-9-CM: 465.9 Vitals BP Pulse Temp Resp Height(growth percentile) Weight(growth percentile) 118/78 78 98.7 °F (37.1 °C) (Oral) 18 5' 4\" (1.626 m) 172 lb 12.8 oz (78.4 kg) LMP SpO2 BMI OB Status Smoking Status 10/03/2017 (Approximate) 98% 29.66 kg/m2 Having regular periods Never Smoker Vitals History BMI and BSA Data Body Mass Index Body Surface Area  
 29.66 kg/m 2 1.88 m 2 Preferred Pharmacy [facility] Name Phone [id number] [phone] Your Updated Medication List  
  
   
This list is accurate as of: 11/24/17 11:41 AM.  Always use your most recent med list.  
  
  
  
  
 ALPRAZolam 1 mg tablet Commonly known as:  Jamaal Yi Take 1 Tab by mouth daily as needed for Anxiety. * amphetamine-dextroamphetamine XR 30 mg XR capsule Commonly known as:  ADDERALL XR Take 1 Cap (30 mg total) by mouth every morningEarliest Fill Date: 9/26/17. Please fill on or after 11/25/17. * amphetamine-dextroamphetamine XR 30 mg XR capsule Commonly known as:  ADDERALL XR Take 1 Cap (30 mg total) by mouth every morningEarliest Fill Date: 9/26/17. Please fill on or after 10/26/17. Max Daily Amount: 30 mg  
  
 * amphetamine-dextroamphetamine XR 30 mg XR capsule Commonly known as:  ADDERALL XR Take 1 Cap (30 mg total) by mouth every morningEarliest Fill Date: 9/26/17. Max Daily Amount: 30 mg  
  
 azithromycin 250 mg tablet Commonly known as:  Camelia East Take two tablets today then one tablet daily  
  
 benzonatate 100 mg capsule Commonly known as:  TESSALON PERLES Take 1 Cap by mouth three (3) times daily as needed for Cough for up to 7 days. buPROPion  mg XL tablet Commonly known as:  Oneda Butt Take 1 Tab by mouth every morning. fluconazole 150 mg tablet Commonly known as:  DIFLUCAN Take 1 Tab by mouth daily for 1 day. loratadine 10 mg tablet Commonly known as:  Bobo Blowers Take 1 Tab by mouth daily for 360 days. norethindrone-ethinyl estradiol 1-20 mg-mcg Tab Commonly known as:  JUNEL 1/20 (21) Take 1 Tab by mouth daily. pantoprazole 20 mg tablet Commonly known as:  PROTONIX Take 1 Tab by mouth daily. * Notice: This list has 3 medication(s) that are the same as other medications prescribed for you. Read the directions carefully, and ask your doctor or other care provider to review them with you. Prescriptions Sent to Pharmacy Refills  
 azithromycin (ZITHROMAX) 250 mg tablet 0 Sig: Take two tablets today then one tablet daily Class: Normal  
 Pharmacy: [facility] [address]  #: [phone] loratadine (CLARITIN) 10 mg tablet 11 Sig: Take 1 Tab by mouth daily for 360 days. Class: Normal  
 Pharmacy: [facility] [address] Ph #: [phone] Route: Oral  
 benzonatate (TESSALON PERLES) 100 mg capsule 1 Sig: Take 1 Cap by mouth three (3) times daily as needed for Cough for up to 7 days. Class: Normal  
 Pharmacy: [facility] [address] Ph #: [phone] Route: Oral  
 fluconazole (DIFLUCAN) 150 mg tablet 5 Sig: Take 1 Tab by mouth daily for 1 day. Class: Normal  
 Pharmacy: [facility] [address] Ph #: [phone] Route: Oral  
  
Follow-up Instructions Return if symptoms worsen or fail to improve. Introducing [facility]! Dear Derrick Martinez: Thank you for requesting a [employer] account. Our records indicate that you already have an active [employer] account. You can access your account anytime at [email or web address]. [email or web address] Did you know that you can access your hospital and ER discharge instructions at any time in [employer]? You can also review all of your test results from your hospital stay or ER visit. Additional Information If you have questions, please visit the Frequently Asked Questions section of the [employer] website at [email or web address]. [email or web address]/. Remember, [employer] is NOT to be used for urgent needs. For medical emergencies, dial 911. Now available from your iPhone and Android! Please provide this summary of care documentation to your next provider. Your primary care clinician is listed as Amanuel Georges. If you have any questions after today's visit, please call [phone].

## 2017-12-20 ENCOUNTER — TELEPHONE (OUTPATIENT)
Dept: OBGYN CLINIC | Age: 22
End: 2017-12-20
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
## 2017-12-20 NOTE — TELEPHONE ENCOUNTER
Not likely related to the trauma, however may have bleeding for 6+ weeks after insertion of the IUD. Please add an ultrasound her her follow-up IUD check appointment if scheduled. If not scheduled then needs to schedule a follow-up iud check with ultrasound before.

## 2017-12-20 NOTE — TELEPHONE ENCOUNTER
Patient aware pf Md recommendations and states that she may have the IUD removed if she is not feeling better within the week. Patient advised that I will schedule an ultrasound before her appointment on 12/28/17.

## 2017-12-21 ENCOUNTER — TELEPHONE (OUTPATIENT)
Dept: FAMILY MEDICINE CLINIC | Age: 22
End: 2017-12-21

## 2017-12-21 DIAGNOSIS — F90.2 ADHD (ATTENTION DEFICIT HYPERACTIVITY DISORDER), COMBINED TYPE: ICD-10-CM

## 2017-12-21 RX ORDER — DEXTROAMPHETAMINE SACCHARATE, AMPHETAMINE ASPARTATE MONOHYDRATE, DEXTROAMPHETAMINE SULFATE AND AMPHETAMINE SULFATE 7.5; 7.5; 7.5; 7.5 MG/1; MG/1; MG/1; MG/1
30 CAPSULE, EXTENDED RELEASE ORAL
Qty: 30 CAP | Refills: 0 | Status: SHIPPED | OUTPATIENT
Start: 2017-12-21 | End: 2018-01-03

## 2017-12-21 NOTE — TELEPHONE ENCOUNTER
----- Message from Denisse Marte sent at 12/21/2017  9:47 AM EST -----  Regarding: Dr. David Andrade  The patient is requesting that a refill for Rx Adderall is available for  from the office. The patient has an appointment scheduled for 1/3/17 and will run out before then.  (t)[phone]

## 2018-01-03 ENCOUNTER — OFFICE VISIT (OUTPATIENT)
Dept: FAMILY MEDICINE CLINIC | Age: 23
End: 2018-01-03

## 2018-01-03 VITALS
DIASTOLIC BLOOD PRESSURE: 76 MMHG | RESPIRATION RATE: 18 BRPM | OXYGEN SATURATION: 97 % | SYSTOLIC BLOOD PRESSURE: 121 MMHG | HEIGHT: 64 IN | HEART RATE: 83 BPM | WEIGHT: 182.31 LBS | BODY MASS INDEX: 31.12 KG/M2 | TEMPERATURE: 98.4 F

## 2018-01-03 DIAGNOSIS — E66.01 MORBID OBESITY (HCC): ICD-10-CM

## 2018-01-03 DIAGNOSIS — F90.2 ATTENTION DEFICIT HYPERACTIVITY DISORDER (ADHD), COMBINED TYPE: Primary | ICD-10-CM

## 2018-01-03 RX ORDER — METHYLPHENIDATE HYDROCHLORIDE 30 MG/1
30 CAPSULE, EXTENDED RELEASE ORAL
Qty: 30 CAP | Refills: 0 | Status: SHIPPED | OUTPATIENT
Start: 2018-01-03 | End: 2018-04-04 | Stop reason: ALTCHOICE

## 2018-01-03 NOTE — PROGRESS NOTES
1. Have you been to the ER, urgent care clinic since your last visit? Hospitalized since your last visit? No    2. Have you seen or consulted any other health care providers outside of the [address] [place] [address] since your last visit? Include any pap smears or colon screening. No   Chief Complaint   Patient presents with    Medication Refill     taking Adderall - wants to talk about changing med         Chief Complaint   Patient presents with    Medication Refill     taking Adderall - wants to talk about changing med     She is a 20 y.o. female who presents for evalution. Reviewed PmHx, RxHx, FmHx, SocHx, AllgHx and updated and dated in the chart. Patient Active Problem List    Diagnosis    Mild preeclampsia delivered    S/P  section    Term  delivered by  section, current hospitalization       Review of Systems - negative except as listed above in the HPI    Objective:     Vitals:    18 1524   BP: 121/76   Pulse: 83   Resp: 18   Temp: 98.4 °F (36.9 °C)   TempSrc: Oral   SpO2: 97%   Weight: 182 lb 5 oz (82.7 kg)   Height: 5' 4\" (1.626 m)     Physical Examination: General appearance - alert, well appearing, and in no distress  Chest - clear to auscultation, no wheezes, rales or rhonchi, symmetric air entry  Heart - normal rate, regular rhythm, normal S1, S2, no murmurs, rubs, clicks or gallops    Assessment/ Plan:   Diagnoses and all orders for this visit:    1. Attention deficit hyperactivity disorder (ADHD), combined type  -     methylphenidate HCl (METADATE CD) 30 mg ER capsule; Take 30 mg by mouth every morning. Max Daily Amount: 30 mg  -try new rx  -dwp wt loss and nl recent labs       Follow-up Disposition:  Return in about 1 month (around 2/3/2018). I have discussed the diagnosis with the patient and the intended plan as seen in the above orders. The patient understands and agrees with the plan.  The patient has received an after-visit summary and questions were answered concerning future plans. Medication Side Effects and Warnings were discussed with patient  Patient Labs were reviewed and or requested:  Patient Past Records were reviewed and or requested    Dani Adler M.D. There are no Patient Instructions on file for this visit.

## 2018-01-03 NOTE — MR AVS SNAPSHOT
Visit Information Date & Time Provider Department Dept. Phone Encounter #  
 1/3/2018  3:00 PM Bre Huber MD [address] [facility] [phone] [id number] Follow-up Instructions Return in about 1 month (around 2/3/2018). Upcoming Health Maintenance Date Due  
 HPV AGE 9Y-34Y (1 of 3 - Female 3 Dose Series) 9/19/2005 PAP AKA CERVICAL CYTOLOGY 10/24/2020 DTaP/Tdap/Td series (2 - Td) 5/15/2024 Allergies as of 1/3/2018  Review Complete On: 1/3/2018 By: Bre Huber MD  
 No Known Allergies Current Immunizations  Reviewed on 5/15/2014 Name Date Influenza Vaccine (Quad) PF 2/7/2017 Tdap 5/15/2014 Not reviewed this visit You Were Diagnosed With   
  
 Codes Comments Attention deficit hyperactivity disorder (ADHD), combined type    -  Primary ICD-10-CM: F90.2 ICD-9-CM: 314.01 Vitals BP Pulse Temp Resp Height(growth percentile) Weight(growth percentile) 121/76 (BP 1 Location: Left arm, BP Patient Position: Sitting) 83 98.4 °F (36.9 °C) (Oral) 18 5' 4\" (1.626 m) 182 lb 5 oz (82.7 kg) SpO2 BMI OB Status Smoking Status 97% 31.29 kg/m2 Implant Never Smoker Vitals History BMI and BSA Data Body Mass Index Body Surface Area  
 31.29 kg/m 2 1.93 m 2 Preferred Pharmacy [facility] Name Phone [id number], [address] [facility] [phone] Your Updated Medication List  
  
   
This list is accurate as of: 1/3/18  3:40 PM.  Always use your most recent med list.  
  
  
  
  
 ALPRAZolam 1 mg tablet Commonly known as:  Yadira Brisk Take 1 Tab by mouth daily as needed for Anxiety. buPROPion  mg XL tablet Commonly known as:  Beola Brandon Take 1 Tab by mouth every morning. loratadine 10 mg tablet Commonly known as:  Johnny Stacy Take 1 Tab by mouth daily for 360 days. methylphenidate HCl 30 mg ER capsule Commonly known as:  METADATE CD  
 Take 30 mg by mouth every morning. Max Daily Amount: 30 mg  
  
 norethindrone-ethinyl estradiol 1-20 mg-mcg Tab Commonly known as:  JUNEL 1/20 (21) Take 1 Tab by mouth daily. pantoprazole 20 mg tablet Commonly known as:  PROTONIX Take 1 Tab by mouth daily. Prescriptions Printed Refills  
 methylphenidate HCl (METADATE CD) 30 mg ER capsule 0 Sig: Take 30 mg by mouth every morning. Max Daily Amount: 30 mg  
 Class: Print Route: Oral  
  
Follow-up Instructions Return in about 1 month (around 2/3/2018). Introducing [facility]! Dear Shantanu Yu: Thank you for requesting a [employer] account. Our records indicate that you already have an active [employer] account. You can access your account anytime at [email or web address]. [email or web address] Did you know that you can access your hospital and ER discharge instructions at any time in [employer]? You can also review all of your test results from your hospital stay or ER visit. Additional Information If you have questions, please visit the Frequently Asked Questions section of the [employer] website at [email or web address]/. Remember, [employer] is NOT to be used for urgent needs. For medical emergencies, dial 911. Now available from your iPhone and Android! Please provide this summary of care documentation to your next provider. Your primary care clinician is listed as Amanuel Georges. If you have any questions after today's visit, please call [phone].

## 2018-03-01 ENCOUNTER — OFFICE VISIT (OUTPATIENT)
Dept: FAMILY MEDICINE CLINIC | Age: 23
End: 2018-03-01

## 2018-03-01 VITALS
TEMPERATURE: 98.7 F | HEART RATE: 81 BPM | WEIGHT: 177.1 LBS | SYSTOLIC BLOOD PRESSURE: 114 MMHG | DIASTOLIC BLOOD PRESSURE: 65 MMHG | RESPIRATION RATE: 18 BRPM | HEIGHT: 64 IN | BODY MASS INDEX: 30.23 KG/M2 | OXYGEN SATURATION: 98 %

## 2018-03-01 DIAGNOSIS — F90.2 ATTENTION DEFICIT HYPERACTIVITY DISORDER (ADHD), COMBINED TYPE: ICD-10-CM

## 2018-03-01 DIAGNOSIS — R10.84 GENERALIZED ABDOMINAL PAIN: Primary | ICD-10-CM

## 2018-03-01 RX ORDER — DEXTROAMPHETAMINE SACCHARATE, AMPHETAMINE ASPARTATE, DEXTROAMPHETAMINE SULFATE AND AMPHETAMINE SULFATE 7.5; 7.5; 7.5; 7.5 MG/1; MG/1; MG/1; MG/1
30 TABLET ORAL DAILY
Qty: 30 TAB | Refills: 0 | Status: SHIPPED | OUTPATIENT
Start: 2018-03-01 | End: 2018-04-04 | Stop reason: SDUPTHER

## 2018-03-01 RX ORDER — ONDANSETRON 8 MG/1
8 TABLET, ORALLY DISINTEGRATING ORAL
Qty: 20 TAB | Refills: 0 | Status: SHIPPED | OUTPATIENT
Start: 2018-03-01 | End: 2018-04-04 | Stop reason: ALTCHOICE

## 2018-03-01 RX ORDER — PANTOPRAZOLE SODIUM 40 MG/1
40 TABLET, DELAYED RELEASE ORAL DAILY
Qty: 30 TAB | Refills: 3 | Status: SHIPPED | OUTPATIENT
Start: 2018-03-01 | End: 2019-06-05 | Stop reason: SDUPTHER

## 2018-03-01 NOTE — MR AVS SNAPSHOT
[address] [address] 
[phone] Patient: Krystle Dodson MRN: [id number] KVI:[birth year] Visit Information Date & Time Provider Department Dept. Phone Encounter #  
 3/1/2018 11:10 AM Nikolay Olguin MD [address] [facility] [phone] [id number] Follow-up Instructions Return in about 1 month (around 4/1/2018) for add. Upcoming Health Maintenance Date Due  
 HPV AGE 9Y-34Y (1 of 3 - Female 3 Dose Series) 9/19/2005 PAP AKA CERVICAL CYTOLOGY 10/24/2020 DTaP/Tdap/Td series (2 - Td) 5/15/2024 Allergies as of 3/1/2018  Review Complete On: 3/1/2018 By: Nikolay Olguin MD  
 No Known Allergies Current Immunizations  Reviewed on 5/15/2014 Name Date Influenza Vaccine (Quad) PF 2/7/2017 Tdap 5/15/2014 Not reviewed this visit You Were Diagnosed With   
  
 Codes Comments Generalized abdominal pain    -  Primary ICD-10-CM: R10.84 ICD-9-CM: 789.07 Attention deficit hyperactivity disorder (ADHD), combined type     ICD-10-CM: F90.2 ICD-9-CM: 314.01 Vitals BP Pulse Temp Resp Height(growth percentile) Weight(growth percentile) 114/65 81 98.7 °F (37.1 °C) (Oral) 18 5' 4\" (1.626 m) 177 lb 1.6 oz (80.3 kg) LMP SpO2 BMI OB Status Smoking Status 02/03/2018 98% 30.4 kg/m2 Implant Never Smoker BMI and BSA Data Body Mass Index Body Surface Area  
 30.4 kg/m 2 1.9 m 2 Preferred Pharmacy Pharmacy Name Phone [id number] [place], [facility] [facility] [phone] Your Updated Medication List  
  
   
This list is accurate as of 3/1/18 12:02 PM.  Always use your most recent med list.  
  
  
  
  
 ALPRAZolam 1 mg tablet Commonly known as:  Smith Mettle Take 1 Tab by mouth daily as needed for Anxiety. buPROPion  mg XL tablet Commonly known as:  Ernesto Balm Take 1 Tab by mouth every morning. dextroamphetamine-amphetamine 30 mg tablet Commonly known as:  ADDERALL Take 1 Tab by mouth dailyIndications: Attention-Deficit Hyperactivity Disorder. Max Daily Amount: 1 Tab  
  
 loratadine 10 mg tablet Commonly known as:  Hans Duke Take 1 Tab by mouth daily for 360 days. methylphenidate HCl 30 mg ER capsule Commonly known as:  METADATE CD Take 30 mg by mouth every morning. Max Daily Amount: 30 mg  
  
 norethindrone-ethinyl estradiol 1-20 mg-mcg Tab Commonly known as:  JUNEL 1/20 (21) Take 1 Tab by mouth daily. ondansetron 8 mg disintegrating tablet Commonly known as:  ZOFRAN ODT Take 1 Tab by mouth every eight (8) hours as needed for Nausea. * pantoprazole 20 mg tablet Commonly known as:  PROTONIX Take 1 Tab by mouth daily. * pantoprazole 40 mg tablet Commonly known as:  PROTONIX Take 1 Tab by mouth daily. Indications: Heartburn * Notice: This list has 2 medication(s) that are the same as other medications prescribed for you. Read the directions carefully, and ask your doctor or other care provider to review them with you. Prescriptions Printed Refills  
 dextroamphetamine-amphetamine (ADDERALL) 30 mg tablet 0 Sig: Take 1 Tab by mouth dailyIndications: Attention-Deficit Hyperactivity Disorder. Max Daily Amount: 1 Tab Class: Print Route: Oral  
  
Prescriptions Sent to Pharmacy Refills  
 pantoprazole (PROTONIX) 40 mg tablet 3 Sig: Take 1 Tab by mouth daily. Indications: Heartburn Class: Normal  
 Pharmacy: [facility] [address] Ph #: [phone] Route: Oral  
 ondansetron (ZOFRAN ODT) 8 mg disintegrating tablet 0 Sig: Take 1 Tab by mouth every eight (8) hours as needed for Nausea. Class: Normal  
 Pharmacy: [address] Ph #: [phone] Route: Oral  
  
We Performed the Following REFERRAL TO GASTROENTEROLOGY [[id number] Custom] Follow-up Instructions Return in about 1 month (around 4/1/2018) for add. Referral Information Referral ID Referred By Referred To  
  
 [id number] T[facility], [address]   
   [address]  [place], [address] [facility] Visits Status Start Date End Date 1 New Request 3/1/18 3/1/19 If your referral has a status of pending review or denied, additional information will be sent to support the outcome of this decision. Introducing [facility]! Dear Odell Ruth: Thank you for requesting a [employer] account. Our records indicate that you already have an active [employer] account. You can access your account anytime at [email or web address] Did you know that you can access your hospital and ER discharge instructions at any time in [employer]? You can also review all of your test results from your hospital stay or ER visit. Additional Information If you have questions, please visit the Frequently Asked Questions section of the [employer] website at [email or web address] Remember, [employer] is NOT to be used for urgent needs. For medical emergencies, dial 911. Now available from your iPhone and Android! Please provide this summary of care documentation to your next provider. Your primary care clinician is listed as Amanuel Georges. If you have any questions after today's visit, please call [phone].

## 2018-03-01 NOTE — PROGRESS NOTES
Chief Complaint   Patient presents with    Abdominal Pain     Discharged from [facility] 2/15/18. Liver enzynes elevated Checked for Gal stones    Nausea     X 3 Weeks    Medication Evaluation     no longer taking Medadate : switch back to Adderall     1. Have you been to the ER, urgent care clinic since your last visit? Hospitalized since your last visit? Yes Where: Went to [facility] ED :Ann Osman    2. Have you seen or consulted any other health care providers outside of the [address] [place] [address] since your last visit? Include any pap smears or colon screening. [facility]        Chief Complaint   Patient presents with    Abdominal Pain     Discharged from [facility] 2/15/18. Liver enzynes elevated Checked for Gal stones    Nausea     X 3 Weeks    Medication Evaluation     no longer taking Medadate : switch back to Adderall     She is a 20 y.o. female who presents for evalution. Reviewed PmHx, RxHx, FmHx, SocHx, AllgHx and updated and dated in the chart.     Patient Active Problem List    Diagnosis    Attention deficit hyperactivity disorder (ADHD), combined type    Morbid obesity ([address]    Mild preeclampsia delivered    S/P  section    Term  delivered by  section, current hospitalization       Review of Systems - negative except as listed above in the HPI    Objective:     Vitals:    18 1152   BP: 114/65   Pulse: 81   Resp: 18   Temp: 98.7 °F (37.1 °C)   TempSrc: Oral   SpO2: 98%   Weight: 177 lb 1.6 oz (80.3 kg)   Height: 5' 4\" (1.626 m)     Physical Examination: General appearance - alert, well appearing, and in no distress  Neck - supple, no significant adenopathy  Chest - clear to auscultation, no wheezes, rales or rhonchi, symmetric air entry  Heart - normal rate, regular rhythm, normal S1, S2, no murmurs, rubs, clicks or gallops  Abdomen - soft, nontender, nondistended, no masses or organomegaly    Assessment/ Plan:   Diagnoses and all orders for this visit:    1. Generalized abdominal pain  -     [facility]  -     pantoprazole (PROTONIX) 40 mg tablet; Take 1 Tab by mouth daily. Indications: Heartburn    2. Attention deficit hyperactivity disorder (ADHD), combined type  -     dextroamphetamine-amphetamine (ADDERALL) 30 mg tablet; Take 1 Tab by mouth dailyIndications: Attention-Deficit Hyperactivity Disorder. Max Daily Amount: 1 Tab  -change back to non XR    Other orders  -     ondansetron (ZOFRAN ODT) 8 mg disintegrating tablet; Take 1 Tab by mouth every eight (8) hours as needed for Nausea. Follow-up Disposition:  Return in about 1 month (around 4/1/2018) for add. I have discussed the diagnosis with the patient and the intended plan as seen in the above orders. The patient understands and agrees with the plan. The patient has received an after-visit summary and questions were answered concerning future plans. Medication Side Effects and Warnings were discussed with patient  Patient Labs were reviewed and or requested:  Patient Past Records were reviewed and or requested    Amie Walsh M.D. There are no Patient Instructions on file for this visit.

## 2018-03-15 ENCOUNTER — ED HISTORICAL/CONVERTED ENCOUNTER (OUTPATIENT)
Dept: OTHER | Age: 23
End: 2018-03-15

## 2018-03-28 ENCOUNTER — OFFICE VISIT (OUTPATIENT)
Dept: OBGYN CLINIC | Age: 23
End: 2018-03-28

## 2018-03-28 DIAGNOSIS — N94.10 DYSPAREUNIA IN FEMALE: ICD-10-CM

## 2018-03-28 DIAGNOSIS — N93.0 PCB (POST COITAL BLEEDING): Primary | ICD-10-CM

## 2018-03-28 NOTE — PROGRESS NOTES
Abnormal bleeding note      Eusebio Mauro is a 20 y.o. female who complains of vaginal bleeding problems. Her current method of family planning is IUD. She developed this problem approximately 4 months ago. She has had vaginal bleeding which she describes as light lasting up to several months. She c/o vaginal bleeding and cramps daily. She states her bleeding is heavier after intercourse. Alleviating factors: none    Aggravating factors: none      The patient is sexually active. Her relevant past medical history:   Past Medical History:   Diagnosis Date    Excessive or frequent menstruation 13    Hx of abnormal Pap smear 2013    ASCUS +HPV    IUD (intrauterine device) in place 10/24/14 mirena resulted in perforation--- Mirena inserted 2017    Mirena placed    Need for prophylactic vaccination and inoculation against other viral diseases(V04.89)     Gardasil injections completed all 3 previously.  Ruptured ovarian cyst         Past Surgical History:   Procedure Laterality Date    HX  SECTION  14    HX DILATION AND CURETTAGE  10/2016     Social History     Occupational History    Not on file. Social History Main Topics    Smoking status: Never Smoker    Smokeless tobacco: Never Used    Alcohol use No    Drug use: No    Sexual activity: Yes     Partners: Male     Birth control/ protection: IUD     Family History   Problem Relation Age of Onset    Hypertension Father     Hypertension Mother        No Known Allergies  Prior to Admission medications    Medication Sig Start Date End Date Taking? Authorizing Provider   dextroamphetamine-amphetamine (ADDERALL) 30 mg tablet Take 1 Tab by mouth dailyIndications: Attention-Deficit Hyperactivity Disorder. Max Daily Amount: 1 Tab 3/1/18   Brenda Nair MD   pantoprazole (PROTONIX) 40 mg tablet Take 1 Tab by mouth daily.  Indications: Heartburn 3/1/18   Brenda Nair MD   ondansetron (ZOFRAN ODT) 8 mg disintegrating tablet Take 1 Tab by mouth every eight (8) hours as needed for Nausea. 3/1/18   Hector Gunderson MD   methylphenidate HCl (METADATE CD) 30 mg ER capsule Take 30 mg by mouth every morning. Max Daily Amount: 30 mg 1/3/18   Hector Gunderson MD   loratadine (CLARITIN) 10 mg tablet Take 1 Tab by mouth daily for 360 days. 11/24/17 11/19/18  Hector Gunderson MD   pantoprazole (PROTONIX) 20 mg tablet Take 1 Tab by mouth daily. 9/26/17   Elmira Rivas NP   ALPRAZolam Lark Hoar) 1 mg tablet Take 1 Tab by mouth daily as needed for Anxiety. 9/26/17   Elmira Rivas NP   norethindrone-ethinyl estradiol (JUNEL 1/20, 21,) 1-20 mg-mcg tab Take 1 Tab by mouth daily. 8/24/17   Sreedhar Nair NP   buPROPion XL (WELLBUTRIN XL) 300 mg XL tablet Take 1 Tab by mouth every morning.  12/6/16   Elmira Rivas NP        Review of Systems - History obtained from the patient  Constitutional: negative for weight loss, fever, night sweats  HEENT: negative for hearing loss, earache, congestion, snoring, sorethroat  CV: negative for chest pain, palpitations, edema  Resp: negative for cough, shortness of breath, wheezing  Breast: negative for breast lumps, nipple discharge, galactorrhea  GI: negative for change in bowel habits, abdominal pain, black or bloody stools  : negative for frequency, dysuria, hematuria  MSK: negative for back pain, joint pain, muscle pain  Skin: negative for itching, rash, hives  Neuro: negative for dizziness, headache, confusion, weakness  Psych: negative for anxiety, depression, change in mood  Heme/lymph: negative for bleeding, bruising, pallor      Objective:    Visit Vitals    Breastfeeding No          PHYSICAL EXAMINATION    Constitutional  · Appearance: well-nourished, well developed, alert, in no acute distress    HENT  · Head and Face: appears normal    Neck  · Inspection/Palpation: normal appearance, no masses or tenderness  · Lymph Nodes: no lymphadenopathy present  · Thyroid: gland size normal, nontender, no nodules or masses present on palpation    Gastrointestinal  · Abdominal Examination: abdomen non-tender to palpation, normal bowel sounds, no masses present  · Liver and spleen: no hepatomegaly present, spleen not palpable  · Hernias: no hernias identified    Genitourinary  · External Genitalia: normal appearance for age, no discharge present, no tenderness present, no inflammatory lesions present, no masses present, no atrophy present  · Vagina: normal vaginal vault without central or paravaginal defects, no discharge present, no inflammatory lesions present, no masses present  · Bladder: non-tender to palpation  · Urethra: appears normal  · Cervix: normal, string visualized   · Uterus: normal size, shape and consistency  · Adnexa: no adnexal tenderness present, no adnexal masses present  · Perineum: perineum within normal limits, no evidence of trauma, no rashes or skin lesions present  · Anus: anus within normal limits, no hemorrhoids present  · Inguinal Lymph Nodes: no lymphadenopathy present    Skin  · General Inspection: no rash, no lesions identified    Neurologic/Psychiatric  · Mental Status:  · Orientation: grossly oriented to person, place and time  · Mood and Affect: mood normal, affect appropriate    Assessment/Plan:   Postcoital spotting and dyspareunia, ultrasound reviewed with pt. Likely related to IUD. Discussed removal vs observation and removal if no improvement. Will send GC/CHL. Instructions given to pt. Handouts given to pt. Ultrasound followup    Jovana Liriano is a 20 y.o. female is here today to review the results of her ultrasound evaluation. Her U/S evaluation is performed because of a previous encounter revealing AUB with IUD which was identified 4 months ago. She is here for an initial ultrasound study. The sonogram: within normal limits. UTERUS IS ANTEVERTED, NORMAL IN SIZE AND ECHOGENICITY. ENDOMETRIUM MEASURES 4-5MM IN THICKNESS.  NO EVIDENCE OF MASS OR ABNORMALITY SEEN  WITHIN THE ENDOMETRIAL CAVITY. IUD IS SEEN IN THE PROPER POSITION WITHIN THE ENDOMETRIAL CAVITY IN THE UTERINE FUNDUS. RIGHT OVARY APPEARS WITHIN NORMAL LIMITS. LEFT OVARY APPEARS WITHIN NORMAL LIMITS. A FOLLICULAR CYST IS SEEN. NO FREE FLUID SEEN IN THE CDS. See detailed report for more information.

## 2018-03-28 NOTE — MR AVS SNAPSHOT
[address] [facility] [address] [facility] [address] 
[phone] Patient: Silvia Trinidad MRN: [id number] VUJ:9[birth year]990 Visit Information Date & Time Provider Department Dept. Phone Encounter #  
 3/28/2018  3:00 PM Lulu Sanchez, [address] [phone] [id number] Your Appointments 3/28/2018  3:00 PM  
ESTABLISHED PATIENT with Lulu Sanchez MD  
[place] ([address]) Appt Note: US + Dr. Yuki Price bleeding + discomfort with intercourse/per: Allison  
 [address]  
[phone]  
  
   
 [address] [address]  
  
    
 3/29/2018  1:30 PM  
ESTABLISHED PATIENT with Yon Nye MD  
[address] [facility] [address]) Appt Note: med eval/ refill  
 [address] [address] [id number] [phone]  
  
   
 [address] [facility] [address] [address] Upcoming Health Maintenance Date Due  
 HPV AGE 9Y-34Y (1 of 3 - Female 3 Dose Series) 9/19/2005 PAP AKA CERVICAL CYTOLOGY 10/24/2020 Allergies as of 3/28/2018  Review Complete On: 3/28/2018 By: Gricelda Ramirez No Known Allergies Current Immunizations  Reviewed on 5/15/2014 Name Date Influenza Vaccine (Quad) PF 2/7/2017 Tdap 5/15/2014 Not reviewed this visit You Were Diagnosed With   
  
 Codes Comments PCB (post coital bleeding)    -  Primary ICD-10-CM: N93.0 ICD-9-CM: 626.7 Vitals Breastfeeding? OB Status Smoking Status No Having regular periods Never Smoker Preferred Pharmacy Pharmacy Name Phone [id number] [place], [address] [facility] [phone] Your Updated Medication List  
  
   
This list is accurate as of 3/28/18  2:50 PM.  Always use your most recent med list.  
  
  
  
  
 ALPRAZolam 1 mg tablet Commonly known as:  Yuik Castro Take 1 Tab by mouth daily as needed for Anxiety. buPROPion  mg XL tablet Commonly known as:  Pauline Shutter Take 1 Tab by mouth every morning. dextroamphetamine-amphetamine 30 mg tablet Commonly known as:  ADDERALL Take 1 Tab by mouth dailyIndications: Attention-Deficit Hyperactivity Disorder. Max Daily Amount: 1 Tab  
  
 loratadine 10 mg tablet Commonly known as:  Casimer Prince Take 1 Tab by mouth daily for 360 days. methylphenidate HCl 30 mg ER capsule Commonly known as:  METADATE CD Take 30 mg by mouth every morning. Max Daily Amount: 30 mg  
  
 norethindrone-ethinyl estradiol 1-20 mg-mcg Tab Commonly known as:  JUNEL 1/20 (21) Take 1 Tab by mouth daily. ondansetron 8 mg disintegrating tablet Commonly known as:  ZOFRAN ODT Take 1 Tab by mouth every eight (8) hours as needed for Nausea. * pantoprazole 20 mg tablet Commonly known as:  PROTONIX Take 1 Tab by mouth daily. * pantoprazole 40 mg tablet Commonly known as:  PROTONIX Take 1 Tab by mouth daily. Indications: Heartburn * Notice: This list has 2 medication(s) that are the same as other medications prescribed for you. Read the directions carefully, and ask your doctor or other care provider to review them with you. We Performed the Following CT/NG/T.VAGINALIS AMPLIFICATION [id number] CPT(R)] Introducing [facility] SERVICES! Dear Crystal Torres: Thank you for requesting a [employer] account. Our records indicate that you already have an active [employer] account. You can access your account anytime at [email or web address] Did you know that you can access your hospital and ER discharge instructions at any time in [employer]? You can also review all of your test results from your hospital stay or ER visit. Additional Information If you have questions, please visit the Frequently Asked Questions section of the AAMPP website at [email or web address] Remember, AAMPP is NOT to be used for urgent needs. For medical emergencies, dial 911. Now available from your iPhone and Android! Please provide this summary of care documentation to your next provider. Your primary care clinician is listed as Amanuel Georges. If you have any questions after today's visit, please call [phone].

## 2018-04-01 LAB
C TRACH RRNA SPEC QL NAA+PROBE: NEGATIVE
N GONORRHOEA RRNA SPEC QL NAA+PROBE: NEGATIVE
T VAGINALIS RRNA SPEC QL NAA+PROBE: NEGATIVE

## 2018-04-04 ENCOUNTER — OFFICE VISIT (OUTPATIENT)
Dept: FAMILY MEDICINE CLINIC | Age: 23
End: 2018-04-04

## 2018-04-04 VITALS
WEIGHT: 176 LBS | HEART RATE: 79 BPM | SYSTOLIC BLOOD PRESSURE: 122 MMHG | OXYGEN SATURATION: 98 % | HEIGHT: 64 IN | BODY MASS INDEX: 30.05 KG/M2 | RESPIRATION RATE: 18 BRPM | TEMPERATURE: 98.2 F | DIASTOLIC BLOOD PRESSURE: 75 MMHG

## 2018-04-04 DIAGNOSIS — F90.2 ATTENTION DEFICIT HYPERACTIVITY DISORDER (ADHD), COMBINED TYPE: ICD-10-CM

## 2018-04-04 RX ORDER — DEXTROAMPHETAMINE SACCHARATE, AMPHETAMINE ASPARTATE, DEXTROAMPHETAMINE SULFATE AND AMPHETAMINE SULFATE 7.5; 7.5; 7.5; 7.5 MG/1; MG/1; MG/1; MG/1
30 TABLET ORAL DAILY
Qty: 30 TAB | Refills: 0 | Status: SHIPPED | OUTPATIENT
Start: 2018-06-03 | End: 2018-08-20 | Stop reason: SDUPTHER

## 2018-04-04 RX ORDER — DEXTROAMPHETAMINE SACCHARATE, AMPHETAMINE ASPARTATE, DEXTROAMPHETAMINE SULFATE AND AMPHETAMINE SULFATE 7.5; 7.5; 7.5; 7.5 MG/1; MG/1; MG/1; MG/1
30 TABLET ORAL DAILY
Qty: 30 TAB | Refills: 0 | Status: SHIPPED | OUTPATIENT
Start: 2018-05-04 | End: 2018-08-20 | Stop reason: SDUPTHER

## 2018-04-04 RX ORDER — DEXTROAMPHETAMINE SACCHARATE, AMPHETAMINE ASPARTATE, DEXTROAMPHETAMINE SULFATE AND AMPHETAMINE SULFATE 7.5; 7.5; 7.5; 7.5 MG/1; MG/1; MG/1; MG/1
30 TABLET ORAL DAILY
Qty: 30 TAB | Refills: 0 | Status: SHIPPED | OUTPATIENT
Start: 2018-04-04 | End: 2018-08-20 | Stop reason: SDUPTHER

## 2018-04-04 NOTE — PROGRESS NOTES
Pt here requesting refills on adderall only. Pt reports that switching back to adderall from metadate has been better, pt reports improved sleep. Subjective: (As above and below)     Chief Complaint   Patient presents with    Medication Refill     she is a 20y.o. year old female who presents for evaluation. Reviewed PmHx, RxHx, FmHx, SocHx, AllgHx and updated in chart. Review of Systems - negative except as listed above    Objective:     Vitals:    04/04/18 1421   BP: 122/75   Pulse: 79   Resp: 18   Temp: 98.2 °F (36.8 °C)   TempSrc: Oral   SpO2: 98%   Weight: 176 lb (79.8 kg)   Height: 5' 4\" (1.626 m)     Physical Examination: General appearance - alert, well appearing, and in no distress  Mental status - normal mood, behavior, speech, dress, motor activity, and thought processes  Mouth - mucous membranes moist, pharynx normal without lesions  Neck - supple, no significant adenopathy  Chest - clear to auscultation, no wheezes, rales or rhonchi, symmetric air entry  Heart - normal rate, regular rhythm, normal S1, S2, no murmurs, rubs, clicks or gallops  Musculoskeletal - no joint tenderness, deformity or swelling    Assessment/ Plan:   1. Attention deficit hyperactivity disorder (ADHD), combined type  -well controlled  -refill x 3 months  - dextroamphetamine-amphetamine (ADDERALL) 30 mg tablet; Take 1 Tab by mouth dailyIndications: Attention-Deficit Hyperactivity Disorder. Max Daily Amount: 1 Tab  Dispense: 30 Tab; Refill: 0  - dextroamphetamine-amphetamine (ADDERALL) 30 mg tablet; Take 1 Tab by mouth dailyEarliest Fill Date: 5/4/18. Max Daily Amount: 1 Tab  Dispense: 30 Tab; Refill: 0  - dextroamphetamine-amphetamine (ADDERALL) 30 mg tablet; Take 1 Tab by mouth dailyEarliest Fill Date: 6/3/18. Max Daily Amount: 1 Tab  Dispense: 30 Tab; Refill: 0     Follow-up Disposition: 3 months  I have discussed the diagnosis with the patient and the intended plan as seen in the above orders.   The patient has received an after-visit summary and questions were answered concerning future plans.      Medication Side Effects and Warnings were discussed with patient: yes  Patient Labs were reviewed: yes  Patient Past Records were reviewed:  yes    Kvng Trivedi M.D.

## 2018-04-04 NOTE — MR AVS SNAPSHOT
[address] [address] 
[phone] Patient: Maureen Irving MRN: [id number] BBA:0/65/8627 Visit Information Date & Time Provider Department Dept. Phone Encounter #  
 4/4/2018  1:45 PM Carmen Chirinos MD [address] [facility] [phone] [id number] Upcoming Health Maintenance Date Due  
 HPV AGE 9Y-34Y (1 of 3 - Female 3 Dose Series) 9/19/2005 PAP AKA CERVICAL CYTOLOGY 10/24/2020 DTaP/Tdap/Td series (2 - Td) 5/15/2024 Allergies as of 4/4/2018  Review Complete On: 4/4/2018 By: Carmen Chirinos MD  
 No Known Allergies Current Immunizations  Reviewed on 5/15/2014 Name Date Influenza Vaccine (Quad) PF 2/7/2017 Tdap 5/15/2014 Not reviewed this visit You Were Diagnosed With   
  
 Codes Comments Attention deficit hyperactivity disorder (ADHD), combined type     ICD-10-CM: F90.2 ICD-9-CM: 314.01 Vitals BP Pulse Temp Resp Height(growth percentile) Weight(growth percentile) 122/75 (BP 1 Location: Left arm, BP Patient Position: Sitting) 79 98.2 °F (36.8 °C) (Oral) 18 5' 4\" (1.626 m) 176 lb (79.8 kg) SpO2 Breastfeeding? BMI OB Status Smoking Status 98% No 30.21 kg/m2 IUD Never Smoker Vitals History BMI and BSA Data Body Mass Index Body Surface Area  
 30.21 kg/m 2 1.9 m 2 Preferred Pharmacy Pharmacy Name Phone [id number], [address] [phone] Your Updated Medication List  
  
   
This list is accurate as of 4/4/18  2:36 PM.  Always use your most recent med list.  
  
  
  
  
 ALPRAZolam 1 mg tablet Commonly known as:  Trula Crick Take 1 Tab by mouth daily as needed for Anxiety. buPROPion  mg XL tablet Commonly known as:  Harden Fuel Take 1 Tab by mouth every morning. * dextroamphetamine-amphetamine 30 mg tablet Commonly known as:  ADDERALL Take 1 Tab by mouth dailyIndications: Attention-Deficit Hyperactivity Disorder. Max Daily Amount: 1 Tab * dextroamphetamine-amphetamine 30 mg tablet Commonly known as:  ADDERALL Take 1 Tab by mouth dailyEarliest Fill Date: 5/4/18. Max Daily Amount: 1 Tab Start taking on:  5/4/2018 * dextroamphetamine-amphetamine 30 mg tablet Commonly known as:  ADDERALL Take 1 Tab by mouth dailyEarliest Fill Date: 6/3/18. Max Daily Amount: 1 Tab Start taking on:  6/3/2018  
  
 loratadine 10 mg tablet Commonly known as:  [place] Take 1 Tab by mouth daily for 360 days. pantoprazole 40 mg tablet Commonly known as:  PROTONIX Take 1 Tab by mouth daily. Indications: Heartburn * Notice: This list has 3 medication(s) that are the same as other medications prescribed for you. Read the directions carefully, and ask your doctor or other care provider to review them with you. Prescriptions Printed Refills  
 dextroamphetamine-amphetamine (ADDERALL) 30 mg tablet 0 Sig: Take 1 Tab by mouth dailyIndications: Attention-Deficit Hyperactivity Disorder. Max Daily Amount: 1 Tab Class: Print Route: Oral  
 dextroamphetamine-amphetamine (ADDERALL) 30 mg tablet 0 Starting on: 5/4/2018 Sig: Take 1 Tab by mouth dailyEarliest Fill Date: 5/4/18. Max Daily Amount: 1 Tab Class: Print Route: Oral  
 dextroamphetamine-amphetamine (ADDERALL) 30 mg tablet 0 Starting on: 6/3/2018 Sig: Take 1 Tab by mouth dailyEarliest Fill Date: 6/3/18. Max Daily Amount: 1 Tab Class: Print Route: Oral  
  
Introducing [facility] & HEALTH SERVICES! Dear Sharri Segal: Thank you for requesting a [employer] account. Our records indicate that you already have an active [employer] account. You can access your account anytime at [email or web address] Did you know that you can access your hospital and ER discharge instructions at any time in Enc[employer]?   You can also review all of your test results from your hospital stay or ER visit. Additional Information If you have questions, please visit the Frequently Asked Questions section of the [employer] website at [email or web address] Remember, [employer] is NOT to be used for urgent needs. For medical emergencies, dial 911. Now available from your iPhone and Android! Please provide this summary of care documentation to your next provider. Your primary care clinician is listed as Amanuel Georges. If you have any questions after today's visit, please call [phone].

## 2018-06-15 DIAGNOSIS — F41.1 GAD (GENERALIZED ANXIETY DISORDER): ICD-10-CM

## 2018-06-15 RX ORDER — ALPRAZOLAM 1 MG/1
TABLET ORAL
Qty: 15 TAB | Refills: 0 | OUTPATIENT
Start: 2018-06-15 | End: 2018-08-20 | Stop reason: SDUPTHER

## 2018-07-11 ENCOUNTER — OFFICE VISIT (OUTPATIENT)
Dept: FAMILY MEDICINE CLINIC | Age: 23
End: 2018-07-11

## 2018-07-11 VITALS — HEIGHT: 64 IN

## 2018-08-20 ENCOUNTER — OFFICE VISIT (OUTPATIENT)
Dept: FAMILY MEDICINE CLINIC | Age: 23
End: 2018-08-20

## 2018-08-20 VITALS
TEMPERATURE: 98 F | SYSTOLIC BLOOD PRESSURE: 113 MMHG | OXYGEN SATURATION: 99 % | WEIGHT: 165 LBS | HEART RATE: 71 BPM | DIASTOLIC BLOOD PRESSURE: 73 MMHG | BODY MASS INDEX: 28.17 KG/M2 | RESPIRATION RATE: 16 BRPM | HEIGHT: 64 IN

## 2018-08-20 DIAGNOSIS — F41.1 GAD (GENERALIZED ANXIETY DISORDER): Primary | ICD-10-CM

## 2018-08-20 DIAGNOSIS — F90.2 ATTENTION DEFICIT HYPERACTIVITY DISORDER (ADHD), COMBINED TYPE: ICD-10-CM

## 2018-08-20 RX ORDER — DEXTROAMPHETAMINE SACCHARATE, AMPHETAMINE ASPARTATE, DEXTROAMPHETAMINE SULFATE AND AMPHETAMINE SULFATE 7.5; 7.5; 7.5; 7.5 MG/1; MG/1; MG/1; MG/1
30 TABLET ORAL DAILY
Qty: 30 TAB | Refills: 0 | Status: SHIPPED | OUTPATIENT
Start: 2018-08-20 | End: 2018-11-28 | Stop reason: SDUPTHER

## 2018-08-20 RX ORDER — FLUOXETINE HYDROCHLORIDE 20 MG/1
20 CAPSULE ORAL DAILY
Qty: 30 CAP | Refills: 3 | Status: SHIPPED | OUTPATIENT
Start: 2018-08-20 | End: 2019-02-27 | Stop reason: ALTCHOICE

## 2018-08-20 RX ORDER — ALPRAZOLAM 1 MG/1
TABLET ORAL
Qty: 15 TAB | Refills: 0 | Status: SHIPPED | OUTPATIENT
Start: 2018-08-20 | End: 2019-02-27 | Stop reason: SDUPTHER

## 2018-08-20 NOTE — MR AVS SNAPSHOT
[address] [address] [place] [address] 
[phone] Patient: Rick Galvan MRN: [id number] OEC:[birth year] Visit Information Date & Time Provider Department Dept. Phone Encounter #  
 8/20/2018  3:45 PM Jami Resendiz MD [facility] [facility] [phone] [id number] Follow-up Instructions Return in about 3 months (around 11/20/2018) for add. Your Appointments 11/19/2018  7:45 AM  
ESTABLISHED PATIENT with Jami Resendiz MD  
[address] [facility]) Appt Note: [facility] [address] [address] [phone]  
  
   
 [address] [place] [address] [address] [address] Upcoming Health Maintenance Date Due  
 HPV Age 9Y-34Y (1 of 3 - Female 3 Dose Series) 9/19/2005 Influenza Age 5 to Adult 8/1/2018 PAP AKA CERVICAL CYTOLOGY 10/24/2020 DTaP/Tdap/Td series (2 - Td) 5/15/2024 Allergies as of 8/20/2018  Review Complete On: 8/20/2018 By: Jami Resendiz MD  
 No Known Allergies Current Immunizations  Reviewed on 5/15/2014 Name Date Influenza Vaccine (Quad) PF 2/7/2017 Tdap 5/15/2014 Not reviewed this visit You Were Diagnosed With   
  
 Codes Comments ROSALIO (generalized anxiety disorder)    -  Primary ICD-10-CM: F41.1 ICD-9-CM: 300.02 Attention deficit hyperactivity disorder (ADHD), combined type     ICD-10-CM: F90.2 ICD-9-CM: 314.01 Vitals BP Pulse Temp Resp Height(growth percentile) Weight(growth percentile) 113/73 71 98 °F (36.7 °C) (Oral) 16 5' 4\" (1.626 m) 165 lb (74.8 kg) SpO2 BMI OB Status Smoking Status 99% 28.32 kg/m2 IUD Never Smoker BMI and BSA Data Body Mass Index Body Surface Area  
 28.32 kg/m 2 1.84 m 2 Preferred Pharmacy [facility]acy Name Phone [id number], [address] [facility] [phone] Your Updated Medication List  
  
   
 This list is accurate as of 8/20/18  4:14 PM.  Always use your most recent med list.  
  
  
  
  
 ALPRAZolam 1 mg tablet Commonly known as:  XANAX  
TAKE 1 TABLET BY MOUTH DAILY AS NEEDED FOR ANXIETY * dextroamphetamine-amphetamine 30 mg tablet Commonly known as:  ADDERALL Take 1 Tab by mouth dailyIndications: Attention-Deficit Hyperactivity Disorder Earliest Fill Date: 8/20/18. Max Daily Amount: 1 Tab * dextroamphetamine-amphetamine 30 mg tablet Commonly known as:  ADDERALL Take 1 Tab by mouth dailyEarliest Fill Date: 8/20/18. Max Daily Amount: 1 Tab * dextroamphetamine-amphetamine 30 mg tablet Commonly known as:  ADDERALL Take 1 Tab by mouth dailyEarliest Fill Date: 8/20/18. Max Daily Amount: 1 Tab FLUoxetine 20 mg capsule Commonly known as:  PROzac Take 1 Cap by mouth daily. pantoprazole 40 mg tablet Commonly known as:  PROTONIX Take 1 Tab by mouth daily. Indications: Heartburn * Notice: This list has 3 medication(s) that are the same as other medications prescribed for you. Read the directions carefully, and ask your doctor or other care provider to review them with you. Prescriptions Printed Refills ALPRAZolam (XANAX) 1 mg tablet 0 Sig: TAKE 1 TABLET BY MOUTH DAILY AS NEEDED FOR ANXIETY Class: Print  
 dextroamphetamine-amphetamine (ADDERALL) 30 mg tablet 0 Sig: Take 1 Tab by mouth dailyIndications: Attention-Deficit Hyperactivity Disorder Earliest Fill Date: 8/20/18. Max Daily Amount: 1 Tab Class: Print Route: Oral  
 dextroamphetamine-amphetamine (ADDERALL) 30 mg tablet 0 Sig: Take 1 Tab by mouth dailyEarliest Fill Date: 8/20/18. Max Daily Amount: 1 Tab Class: Print Route: Oral  
 dextroamphetamine-amphetamine (ADDERALL) 30 mg tablet 0 Sig: Take 1 Tab by mouth dailyEarliest Fill Date: 8/20/18. Max Daily Amount: 1 Tab Class: Print Route: Oral  
  
Prescriptions Sent to Pharmacy Refills FLUoxetine (PROZAC) 20 mg capsule 3 Sig: Take 1 Cap by mouth daily. Class: Normal  
 Pharmacy: [facility] [address]  #: [phone] Route: Oral  
  
Follow-up Instructions Return in about 3 months (around 11/20/2018) for add. Introducing [facility] & HEALTH SERVICES! Dear Alexa Randhawa: Thank you for requesting a Re[employer] account. Our records indicate that you already have an active Re[employer] account. You can access your account anytime at [email or web address]/"Cont[employer] [email or web address]" Did you know that you can access your hospital and ER discharge instructions at any time in Re[employer]APX? You can also review all of your test results from your hospital stay or ER visit. Additional Information If you have questions, please visit the Frequently Asked Questions section of the Re[employer] website at [email or web address] Remember, Realty[employer] is NOT to be used for urgent needs. For medical emergencies, dial 911. Now available from your iPhone and Android! Please provide this summary of care documentation to your next provider. Your primary care clinician is listed as Amanuel Georges. If you have any questions after today's visit, please call [phone].

## 2018-08-20 NOTE — PROGRESS NOTES
Chief Complaint   Patient presents with    Attention Deficit Disorder    Medication Refill     1. Have you been to the ER, urgent care clinic since your last visit? Hospitalized since your last visit? No    2. Have you seen or consulted any other health care providers outside of the [address] [place] [address] since your last visit? Include any pap smears or colon screening. No        Chief Complaint   Patient presents with    Attention Deficit Disorder    Medication Refill     She is a 20 y.o. female who presents for evalution. Reviewed PmHx, RxHx, FmHx, SocHx, AllgHx and updated and dated in the chart. Patient Active Problem List    Diagnosis    Attention deficit hyperactivity disorder (ADHD), combined type    Morbid obesity ([facility] [address]    Mild preeclampsia delivered    S/P  section    Term  delivered by  section, current hospitalization       Review of Systems - negative except as listed above in the HPI    Objective:     Vitals:    18 1604   BP: 113/73   Pulse: 71   Resp: 16   Temp: 98 °F (36.7 °C)   TempSrc: Oral   SpO2: 99%   Weight: 165 lb (74.8 kg)   Height: 5' 4\" (1.626 m)     Physical Examination: General appearance - alert, well appearing, and in no distress    Assessment/ Plan:   Diagnoses and all orders for this visit:    1. ROSALIO (generalized anxiety disorder)  -     ALPRAZolam (XANAX) 1 mg tablet; TAKE 1 TABLET BY MOUTH DAILY AS NEEDED FOR ANXIETY    2. Attention deficit hyperactivity disorder (ADHD), combined type  -     dextroamphetamine-amphetamine (ADDERALL) 30 mg tablet; Take 1 Tab by mouth dailyIndications: Attention-Deficit Hyperactivity Disorder Earliest Fill Date: 18. Max Daily Amount: 1 Tab  -     dextroamphetamine-amphetamine (ADDERALL) 30 mg tablet; Take 1 Tab by mouth dailyEarliest Fill Date: 18. Max Daily Amount: 1 Tab  -     dextroamphetamine-amphetamine (ADDERALL) 30 mg tablet; Take 1 Tab by mouth dailyEarliest Fill Date: 18.   Max Daily Amount: 1 Tab    Other orders  -     FLUoxetine (PROZAC) 20 mg capsule; Take 1 Cap by mouth daily.  -add rx       Follow-up Disposition:  Return in about 3 months (around 11/20/2018) for add. I have discussed the diagnosis with the patient and the intended plan as seen in the above orders. The patient understands and agrees with the plan. The patient has received an after-visit summary and questions were answered concerning future plans. Medication Side Effects and Warnings were discussed with patient  Patient Labs were reviewed and or requested:  Patient Past Records were reviewed and or requested    Karin Cruz M.D. There are no Patient Instructions on file for this visit.

## 2018-10-18 ENCOUNTER — OFFICE VISIT (OUTPATIENT)
Dept: FAMILY MEDICINE CLINIC | Age: 23
End: 2018-10-18

## 2018-10-18 VITALS
RESPIRATION RATE: 16 BRPM | DIASTOLIC BLOOD PRESSURE: 81 MMHG | TEMPERATURE: 97.2 F | BODY MASS INDEX: 25.95 KG/M2 | HEIGHT: 64 IN | WEIGHT: 152 LBS | OXYGEN SATURATION: 97 % | SYSTOLIC BLOOD PRESSURE: 118 MMHG | HEART RATE: 93 BPM

## 2018-10-18 DIAGNOSIS — J01.00 ACUTE NON-RECURRENT MAXILLARY SINUSITIS: Primary | ICD-10-CM

## 2018-10-18 RX ORDER — AMOXICILLIN AND CLAVULANATE POTASSIUM 875; 125 MG/1; MG/1
1 TABLET, FILM COATED ORAL EVERY 12 HOURS
Qty: 14 TAB | Refills: 0 | Status: SHIPPED | OUTPATIENT
Start: 2018-10-18 | End: 2018-10-25

## 2018-10-18 RX ORDER — FLUCONAZOLE 150 MG/1
150 TABLET ORAL DAILY
Qty: 2 TAB | Refills: 0 | Status: SHIPPED | OUTPATIENT
Start: 2018-10-18 | End: 2018-10-19

## 2018-10-18 NOTE — PROGRESS NOTES
Chief Complaint   Patient presents with    Nasal Congestion     Patient presents in office with nasal congestion since Monday. She has taken Flonase, sudafed and ibuprofen OTC with no relief.

## 2018-10-18 NOTE — PROGRESS NOTES
Adina Trotter is a 24 y.o. female   Chief Complaint   Patient presents with    Nasal Congestion    pt here for sinus congestion and body aches for past week. Throat sore also and some R ear pain in the morning.  + chills but no fever. Pt has been using motrin and nasal spray. Feels like it is worsening as well.      she is a 24y.o. year old female who presents for evalution. Reviewed PmHx, RxHx, FmHx, SocHx, AllgHx and updated and dated in the chart. Review of Systems - negative except as listed above in the HPI    Objective:     Vitals:    10/18/18 1341   BP: 118/81   Pulse: 93   Resp: 16   Temp: 97.2 °F (36.2 °C)   TempSrc: Oral   SpO2: 97%   Weight: 152 lb (68.9 kg)   Height: 5' 4\" (1.626 m)       Current Outpatient Medications   Medication Sig    amoxicillin-clavulanate (AUGMENTIN) 875-125 mg per tablet Take 1 Tab by mouth every twelve (12) hours for 7 days.  fluconazole (DIFLUCAN) 150 mg tablet Take 1 Tab by mouth daily for 1 day. Repeat in 3 days if needed    methylPREDNISolone, PF, (SOLU-MEDROL) 125 mg/2 mL solr 2 mL by IntraVENous route once for 1 dose.  ALPRAZolam (XANAX) 1 mg tablet TAKE 1 TABLET BY MOUTH DAILY AS NEEDED FOR ANXIETY    dextroamphetamine-amphetamine (ADDERALL) 30 mg tablet Take 1 Tab by mouth dailyIndications: Attention-Deficit Hyperactivity Disorder Earliest Fill Date: 8/20/18. Max Daily Amount: 1 Tab    dextroamphetamine-amphetamine (ADDERALL) 30 mg tablet Take 1 Tab by mouth dailyEarliest Fill Date: 8/20/18. Max Daily Amount: 1 Tab    dextroamphetamine-amphetamine (ADDERALL) 30 mg tablet Take 1 Tab by mouth dailyEarliest Fill Date: 8/20/18. Max Daily Amount: 1 Tab    FLUoxetine (PROZAC) 20 mg capsule Take 1 Cap by mouth daily.  pantoprazole (PROTONIX) 40 mg tablet Take 1 Tab by mouth daily. Indications: Heartburn     No current facility-administered medications for this visit.         Physical Examination: General appearance - alert, well appearing, and in no distress  Eyes - pupils equal and reactive, extraocular eye movements intact  Ears - bilateral TM's and external ear canals normal  Nose - mucosal congestion, mucosal erythema and sinus tenderness noted maxillary b/l  Mouth - mucous membranes moist, pharynx normal without lesions  Neck - supple, no significant adenopathy  Chest - clear to auscultation, no wheezes, rales or rhonchi, symmetric air entry  Heart - normal rate, regular rhythm, normal S1, S2, no murmurs, rubs, clicks or gallops      Assessment/ Plan:   Diagnoses and all orders for this visit:    Acute non-recurrent maxillary sinusitis  -     amoxicillin-clavulanate (AUGMENTIN) 875-125 mg per tablet; Take 1 Tab by mouth every twelve (12) hours for 7 days. -     methylPREDNISolone, PF, (SOLU-MEDROL) 125 mg/2 mL solr; 2 mL by IntraVENous route once for 1 dose. -     METHYLPREDNISOLONE INJECTION  -     GA THER/PROPH/DIAG INJECTION, SUBCUT/IM    Other orders  -     fluconazole (DIFLUCAN) 150 mg tablet; Take 1 Tab by mouth daily for 1 day. Repeat in 3 days if needed       Follow-up Disposition: Not on File    I have discussed the diagnosis with the patient and the intended plan as seen in the above orders. The patient has received an after-visit summary and questions were answered concerning future plans. Pt conveyed understanding of plan.     Medication Side Effects and Warnings were discussed with patient      Ginger Aviles DO

## 2018-10-18 NOTE — PATIENT INSTRUCTIONS
Sinusitis: Care Instructions  Your Care Instructions    Sinusitis is an infection of the lining of the sinus cavities in your head. Sinusitis often follows a cold. It causes pain and pressure in your head and face. In most cases, sinusitis gets better on its own in 1 to 2 weeks. But some mild symptoms may last for several weeks. Sometimes antibiotics are needed. Follow-up care is a key part of your treatment and safety. Be sure to make and go to all appointments, and call your doctor if you are having problems. It's also a good idea to know your test results and keep a list of the medicines you take. How can you care for yourself at home? · Take an over-the-counter pain medicine, such as acetaminophen (Tylenol), ibuprofen (Advil, Motrin), or naproxen (Aleve). Read and follow all instructions on the label. · If the doctor prescribed antibiotics, take them as directed. Do not stop taking them just because you feel better. You need to take the full course of antibiotics. · Be careful when taking over-the-counter cold or flu medicines and Tylenol at the same time. Many of these medicines have acetaminophen, which is Tylenol. Read the labels to make sure that you are not taking more than the recommended dose. Too much acetaminophen (Tylenol) can be harmful. · Breathe warm, moist air from a steamy shower, a hot bath, or a sink filled with hot water. Avoid cold, dry air. Using a humidifier in your home may help. Follow the directions for cleaning the machine. · Use saline (saltwater) nasal washes to help keep your nasal passages open and wash out mucus and bacteria. You can buy saline nose drops at a grocery store or drugstore. Or you can make your own at home by adding 1 teaspoon of salt and 1 teaspoon of baking soda to 2 cups of distilled water. If you make your own, fill a bulb syringe with the solution, insert the tip into your nostril, and squeeze gently. [place] your nose.   · Put a hot, wet towel or a warm gel pack on your face 3 or 4 times a day for 5 to 10 minutes each time. · Try a decongestant nasal spray like oxymetazoline (Afrin). Do not use it for more than 3 days in a row. Using it for more than 3 days can make your congestion worse. When should you call for help? Call your doctor now or seek immediate medical care if:    · You have new or worse swelling or redness in your face or around your eyes.     · You have a new or higher fever.    Watch closely for changes in your health, and be sure to contact your doctor if:    · You have new or worse facial pain.     · The mucus from your nose becomes thicker (like pus) or has new blood in it.     · You are not getting better as expected. Where can you learn more? Go to http://luly-donavon.info/. Enter M685 in the search box to learn more about \"Sinusitis: Care Instructions. \"  Current as of: March 28, 2018  Content Version: 11.8  © 8857-5505 [employer]. Care instructions adapted under license by [employer] (which disclaims liability or warranty for this information). If you have questions about a medical condition or this instruction, always ask your healthcare professional. [place] [address] any warranty or liability for your use of this information. lower

## 2018-11-28 ENCOUNTER — OFFICE VISIT (OUTPATIENT)
Dept: FAMILY MEDICINE CLINIC | Age: 23
End: 2018-11-28

## 2018-11-28 VITALS
HEIGHT: 64 IN | BODY MASS INDEX: 27.31 KG/M2 | DIASTOLIC BLOOD PRESSURE: 72 MMHG | OXYGEN SATURATION: 97 % | WEIGHT: 160 LBS | RESPIRATION RATE: 16 BRPM | TEMPERATURE: 98 F | SYSTOLIC BLOOD PRESSURE: 119 MMHG | HEART RATE: 95 BPM

## 2018-11-28 DIAGNOSIS — F90.2 ATTENTION DEFICIT HYPERACTIVITY DISORDER (ADHD), COMBINED TYPE: ICD-10-CM

## 2018-11-28 RX ORDER — DEXTROAMPHETAMINE SACCHARATE, AMPHETAMINE ASPARTATE, DEXTROAMPHETAMINE SULFATE AND AMPHETAMINE SULFATE 7.5; 7.5; 7.5; 7.5 MG/1; MG/1; MG/1; MG/1
30 TABLET ORAL DAILY
Qty: 30 TAB | Refills: 0 | Status: SHIPPED | OUTPATIENT
Start: 2018-11-28 | End: 2019-02-27 | Stop reason: SDUPTHER

## 2018-11-28 RX ORDER — DEXTROAMPHETAMINE SACCHARATE, AMPHETAMINE ASPARTATE, DEXTROAMPHETAMINE SULFATE AND AMPHETAMINE SULFATE 7.5; 7.5; 7.5; 7.5 MG/1; MG/1; MG/1; MG/1
30 TABLET ORAL DAILY
Qty: 30 TAB | Refills: 0 | Status: SHIPPED | OUTPATIENT
Start: 2018-11-28 | End: 2019-06-05 | Stop reason: SDUPTHER

## 2018-11-28 NOTE — PROGRESS NOTES
Chief Complaint   Patient presents with    Attention Deficit Disorder    Medication Evaluation     1. Have you been to the ER, urgent care clinic since your last visit? Hospitalized since your last visit? No    2. Have you seen or consulted any other health care providers outside of the [address] [place] [address] since your last visit? Include any pap smears or colon screening. No      Chief Complaint   Patient presents with    Attention Deficit Disorder    Medication Evaluation     She is a 24 y.o. female who presents for evalution. Reviewed PmHx, RxHx, FmHx, SocHx, AllgHx and updated and dated in the chart. Patient Active Problem List    Diagnosis    Attention deficit hyperactivity disorder (ADHD), combined type    Morbid obesity ([facility] [address]    Mild preeclampsia delivered    S/P  section    Term  delivered by  section, current hospitalization       Review of Systems - negative except as listed above in the HPI    Objective:     Vitals:    18 1412   BP: 119/72   Pulse: 95   Resp: 16   Temp: 98 °F (36.7 °C)   TempSrc: Oral   SpO2: 97%   Weight: 160 lb (72.6 kg)   Height: 5' 4\" (1.626 m)     Physical Examination: General appearance - alert, well appearing, and in no distress  Neck - supple, no significant adenopathy  Chest - clear to auscultation, no wheezes, rales or rhonchi, symmetric air entry  Heart - normal rate, regular rhythm, normal S1, S2, no murmurs, rubs, clicks or gallops    Assessment/ Plan:   Diagnoses and all orders for this visit:    1. Attention deficit hyperactivity disorder (ADHD), combined type  -     dextroamphetamine-amphetamine (ADDERALL) 30 mg tablet; Take 1 Tab by mouth daily. Max Daily Amount: 1 Tab  -     dextroamphetamine-amphetamine (ADDERALL) 30 mg tablet; Take 1 Tab by mouth daily. Max Daily Amount: 1 Tab  -     dextroamphetamine-amphetamine (ADDERALL) 30 mg tablet; Take 1 Tab by mouth daily.   Max Daily Amount: 1 Tab       Follow-up Disposition:  Return if symptoms worsen or fail to improve. I have discussed the diagnosis with the patient and the intended plan as seen in the above orders. The patient understands and agrees with the plan. The patient has received an after-visit summary and questions were answered concerning future plans. Medication Side Effects and Warnings were discussed with patient  Patient Labs were reviewed and or requested:  Patient Past Records were reviewed and or requested    Anabell Rhoades M.D. There are no Patient Instructions on file for this visit.

## 2019-02-27 ENCOUNTER — OFFICE VISIT (OUTPATIENT)
Dept: FAMILY MEDICINE CLINIC | Age: 24
End: 2019-02-27

## 2019-02-27 VITALS
WEIGHT: 162 LBS | HEART RATE: 78 BPM | SYSTOLIC BLOOD PRESSURE: 128 MMHG | OXYGEN SATURATION: 98 % | RESPIRATION RATE: 18 BRPM | BODY MASS INDEX: 27.66 KG/M2 | HEIGHT: 64 IN | DIASTOLIC BLOOD PRESSURE: 80 MMHG | TEMPERATURE: 98.6 F

## 2019-02-27 DIAGNOSIS — F41.1 GAD (GENERALIZED ANXIETY DISORDER): ICD-10-CM

## 2019-02-27 DIAGNOSIS — F90.2 ATTENTION DEFICIT HYPERACTIVITY DISORDER (ADHD), COMBINED TYPE: ICD-10-CM

## 2019-02-27 RX ORDER — DEXTROAMPHETAMINE SACCHARATE, AMPHETAMINE ASPARTATE, DEXTROAMPHETAMINE SULFATE AND AMPHETAMINE SULFATE 7.5; 7.5; 7.5; 7.5 MG/1; MG/1; MG/1; MG/1
30 TABLET ORAL DAILY
Qty: 30 TAB | Refills: 0 | Status: SHIPPED | OUTPATIENT
Start: 2019-02-27 | End: 2019-02-27 | Stop reason: SDUPTHER

## 2019-02-27 RX ORDER — DEXTROAMPHETAMINE SACCHARATE, AMPHETAMINE ASPARTATE, DEXTROAMPHETAMINE SULFATE AND AMPHETAMINE SULFATE 7.5; 7.5; 7.5; 7.5 MG/1; MG/1; MG/1; MG/1
30 TABLET ORAL DAILY
Qty: 30 TAB | Refills: 0 | Status: SHIPPED | OUTPATIENT
Start: 2019-02-27 | End: 2019-06-05 | Stop reason: SDUPTHER

## 2019-02-27 RX ORDER — ALPRAZOLAM 1 MG/1
TABLET ORAL
Qty: 15 TAB | Refills: 0 | Status: SHIPPED | OUTPATIENT
Start: 2019-02-27 | End: 2019-05-23 | Stop reason: SDUPTHER

## 2019-02-27 NOTE — PROGRESS NOTES
Patient here for med refill. 1. Have you been to the ER, urgent care clinic since your last visit? Hospitalized since your last visit? No    2. Have you seen or consulted any other health care providers outside of the [address] [place] [address] since your last visit? Include any pap smears or colon screening. No       Chief Complaint   Patient presents with    Medication Refill     She is a 24 y.o. female who presents for evalution. Reviewed PmHx, RxHx, FmHx, SocHx, AllgHx and updated and dated in the chart. Patient Active Problem List    Diagnosis    Attention deficit hyperactivity disorder (ADHD), combined type    Morbid obesity ([facility] [address]    Mild preeclampsia delivered    S/P  section    Term  delivered by  section, current hospitalization       Review of Systems - negative except as listed above in the HPI    Objective:     Vitals:    19 1424   BP: 128/80   Pulse: 78   Resp: 18   Temp: 98.6 °F (37 °C)   SpO2: 98%   Weight: 162 lb (73.5 kg)   Height: 5' 4\" (1.626 m)     Physical Examination: General appearance - alert, well appearing, and in no distress  Neck - supple, no significant adenopathy  Chest - clear to auscultation, no wheezes, rales or rhonchi, symmetric air entry  Heart - normal rate, regular rhythm, normal S1, S2, no murmurs, rubs, clicks or gallops        Assessment/ Plan:   Diagnoses and all orders for this visit:    1. Attention deficit hyperactivity disorder (ADHD), combined type  -     dextroamphetamine-amphetamine (ADDERALL) 30 mg tablet; Take 1 Tab by mouth daily. Max Daily Amount: 1 Tab  -     dextroamphetamine-amphetamine (ADDERALL) 30 mg tablet; Take 1 Tab by mouth daily. Max Daily Amount: 1 Tab  -3 fills    2. ROSALIO (generalized anxiety disorder)  -     ALPRAZolam (XANAX) 1 mg tablet; TAKE 1 TABLET BY MOUTH DAILY AS NEEDED FOR ANXIETY       Follow-up Disposition:  Return in about 3 months (around 2019) for add.     I have discussed the diagnosis with the patient and the intended plan as seen in the above orders. The patient understands and agrees with the plan. The patient has received an after-visit summary and questions were answered concerning future plans. Medication Side Effects and Warnings were discussed with patient  Patient Labs were reviewed and or requested:  Patient Past Records were reviewed and or requested    Snow Oliver M.D. There are no Patient Instructions on file for this visit.

## 2019-04-24 ENCOUNTER — ED HISTORICAL/CONVERTED ENCOUNTER (OUTPATIENT)
Dept: OTHER | Age: 24
End: 2019-04-24

## 2019-05-23 DIAGNOSIS — F41.1 GAD (GENERALIZED ANXIETY DISORDER): ICD-10-CM

## 2019-05-23 RX ORDER — ALPRAZOLAM 1 MG/1
TABLET ORAL
Qty: 10 TAB | Refills: 0 | OUTPATIENT
Start: 2019-05-23 | End: 2019-06-05 | Stop reason: SDUPTHER

## 2019-06-05 ENCOUNTER — OFFICE VISIT (OUTPATIENT)
Dept: FAMILY MEDICINE CLINIC | Age: 24
End: 2019-06-05

## 2019-06-05 VITALS
TEMPERATURE: 97.9 F | DIASTOLIC BLOOD PRESSURE: 75 MMHG | SYSTOLIC BLOOD PRESSURE: 127 MMHG | HEIGHT: 64 IN | WEIGHT: 151.1 LBS | RESPIRATION RATE: 16 BRPM | BODY MASS INDEX: 25.8 KG/M2 | HEART RATE: 97 BPM | OXYGEN SATURATION: 94 %

## 2019-06-05 DIAGNOSIS — G43.809 OTHER MIGRAINE WITHOUT STATUS MIGRAINOSUS, NOT INTRACTABLE: Primary | ICD-10-CM

## 2019-06-05 DIAGNOSIS — R10.84 GENERALIZED ABDOMINAL PAIN: ICD-10-CM

## 2019-06-05 DIAGNOSIS — F41.1 GAD (GENERALIZED ANXIETY DISORDER): ICD-10-CM

## 2019-06-05 DIAGNOSIS — F90.2 ATTENTION DEFICIT HYPERACTIVITY DISORDER (ADHD), COMBINED TYPE: ICD-10-CM

## 2019-06-05 RX ORDER — ALPRAZOLAM 1 MG/1
TABLET ORAL
Qty: 10 TAB | Refills: 0 | Status: SHIPPED | OUTPATIENT
Start: 2019-06-05 | End: 2020-01-28 | Stop reason: SDUPTHER

## 2019-06-05 RX ORDER — DEXTROAMPHETAMINE SACCHARATE, AMPHETAMINE ASPARTATE, DEXTROAMPHETAMINE SULFATE AND AMPHETAMINE SULFATE 7.5; 7.5; 7.5; 7.5 MG/1; MG/1; MG/1; MG/1
30 TABLET ORAL DAILY
Qty: 30 TAB | Refills: 0 | Status: SHIPPED | OUTPATIENT
Start: 2019-06-05 | End: 2019-09-17 | Stop reason: SDUPTHER

## 2019-06-05 RX ORDER — KETOROLAC TROMETHAMINE 30 MG/ML
60 INJECTION, SOLUTION INTRAMUSCULAR; INTRAVENOUS ONCE
Qty: 1 VIAL | Refills: 0
Start: 2019-06-05 | End: 2019-06-05

## 2019-06-05 RX ORDER — DEXTROAMPHETAMINE SACCHARATE, AMPHETAMINE ASPARTATE, DEXTROAMPHETAMINE SULFATE AND AMPHETAMINE SULFATE 7.5; 7.5; 7.5; 7.5 MG/1; MG/1; MG/1; MG/1
30 TABLET ORAL DAILY
Qty: 30 TAB | Refills: 0 | Status: SHIPPED | OUTPATIENT
Start: 2019-06-05 | End: 2020-01-28 | Stop reason: SDUPTHER

## 2019-06-05 RX ORDER — PANTOPRAZOLE SODIUM 40 MG/1
40 TABLET, DELAYED RELEASE ORAL DAILY
Qty: 30 TAB | Refills: 3 | Status: SHIPPED | OUTPATIENT
Start: 2019-06-05 | End: 2019-11-15 | Stop reason: SDUPTHER

## 2019-06-05 NOTE — LETTER
NOTIFICATION RETURN TO WORK / SCHOOL 
 
6/5/2019 3:23 PM 
 
Ms. Kayce Espinal [address] [facility] [id number] To Whom It May Concern: 
 
Kayce Espinal is currently under the care of [address]. Out of work 06/4/19 She will return to work/school on: 06/05/19 If there are questions or concerns please have the patient contact our office. Sincerely, Patrick Medina MD

## 2019-06-05 NOTE — PROGRESS NOTES
Chief Complaint   Patient presents with    Migraine    Anxiety    Attention Deficit Disorder    Medication Refill     1. Have you been to the ER, urgent care clinic since your last visit? Hospitalized since your last visit? No    2. Have you seen or consulted any other health care providers outside of the [address] [place] [address] since your last visit? Include any pap smears or colon screening. No    Chief Complaint   Patient presents with    Migraine    Anxiety    Attention Deficit Disorder    Medication Refill     She is a 24 y.o. female who presents for evalution. Reviewed PmHx, RxHx, FmHx, SocHx, AllgHx and updated and dated in the chart. Patient Active Problem List    Diagnosis    Attention deficit hyperactivity disorder (ADHD), combined type    Morbid obesity ([facility] [address]    Mild preeclampsia delivered    S/P  section    Term  delivered by  section, current hospitalization       Review of Systems - negative except as listed above in the HPI    Objective:     Vitals:    19 1519   BP: 127/75   Pulse: 97   Resp: 16   Temp: 97.9 °F (36.6 °C)   TempSrc: Oral   SpO2: 94%   Weight: 151 lb 1.6 oz (68.5 kg)   Height: 5' 4\" (1.626 m)     Physical Examination: General appearance - alert, well appearing, and in no distress  Chest - clear to auscultation, no wheezes, rales or rhonchi, symmetric air entry  Heart - normal rate, regular rhythm, normal S1, S2, no murmurs, rubs, clicks or gallops      Assessment/ Plan:   Diagnoses and all orders for this visit:    1. Other migraine without status migrainosus, not intractable  -     ketorolac tromethamine (TORADOL) 60 mg/2 mL soln; 2 mL by IntraMUSCular route once for 1 dose. -     KETOROLAC TROMETHAMINE INJ  -     [place] THER/PROPH/DIAG INJECTION, SUBCUT/IM    2. ROSALIO (generalized anxiety disorder)  -     ALPRAZolam (XANAX) 1 mg tablet; TAKE 1 TABLET BY MOUTH DAILY AS NEEDED FOR ANXIETY    3.  Attention deficit hyperactivity disorder (ADHD), combined type  -     dextroamphetamine-amphetamine (ADDERALL) 30 mg tablet; Take 1 Tab by mouth daily. Max Daily Amount: 1 Tab.  -     dextroamphetamine-amphetamine (ADDERALL) 30 mg tablet; Take 1 Tab by mouth daily. Max Daily Amount: 1 Tab.  -     dextroamphetamine-amphetamine (ADDERALL) 30 mg tablet; Take 1 Tab by mouth daily. Max Daily Amount: 1 Tab. Indications: Attention Deficit Disorder with Hyperactivity    4. Generalized abdominal pain  -     pantoprazole (PROTONIX) 40 mg tablet; Take 1 Tab by mouth daily. Indications: heartburn       Follow-up and Dispositions    · Return in about 3 months (around 9/5/2019) for add. I have discussed the diagnosis with the patient and the intended plan as seen in the above orders. The patient understands and agrees with the plan. The patient has received an after-visit summary and questions were answered concerning future plans. Medication Side Effects and Warnings were discussed with patient  Patient Labs were reviewed and or requested:  Patient Past Records were reviewed and or requested    Kanwal Thomason M.D. There are no Patient Instructions on file for this visit.

## 2019-07-25 ENCOUNTER — OFFICE VISIT (OUTPATIENT)
Dept: FAMILY MEDICINE CLINIC | Age: 24
End: 2019-07-25

## 2019-07-25 VITALS
HEIGHT: 64 IN | TEMPERATURE: 98.4 F | BODY MASS INDEX: 27.88 KG/M2 | SYSTOLIC BLOOD PRESSURE: 120 MMHG | WEIGHT: 163.3 LBS | HEART RATE: 76 BPM | RESPIRATION RATE: 17 BRPM | OXYGEN SATURATION: 97 % | DIASTOLIC BLOOD PRESSURE: 77 MMHG

## 2019-07-25 DIAGNOSIS — G43.809 OTHER MIGRAINE WITHOUT STATUS MIGRAINOSUS, NOT INTRACTABLE: Primary | ICD-10-CM

## 2019-07-25 RX ORDER — SUMATRIPTAN 100 MG/1
100 TABLET, FILM COATED ORAL
Qty: 12 TAB | Refills: 2 | Status: SHIPPED | OUTPATIENT
Start: 2019-07-25 | End: 2019-10-24 | Stop reason: SDUPTHER

## 2019-07-25 RX ORDER — KETOROLAC TROMETHAMINE 30 MG/ML
60 INJECTION, SOLUTION INTRAMUSCULAR; INTRAVENOUS ONCE
Qty: 1 VIAL | Refills: 0
Start: 2019-07-25 | End: 2019-07-25

## 2019-07-25 RX ORDER — ONDANSETRON 4 MG/1
4 TABLET, ORALLY DISINTEGRATING ORAL
Qty: 30 TAB | Refills: 1 | Status: SHIPPED | OUTPATIENT
Start: 2019-07-25 | End: 2020-02-17

## 2019-07-25 NOTE — LETTER
NOTIFICATION RETURN TO WORK / SCHOOL 
 
7/25/2019 8:39 AM 
 
Ms. Berenice Garrido [address] [facility] [address] To Whom It May Concern: 
 
Berenice Garrido is currently under the care of [address]. Out of school 7/24/19 and 7/25/19 She will return to work/school on: 7/29/19 If there are questions or concerns please have the patient contact our office. Sincerely, Whitney Templeton MD

## 2019-07-25 NOTE — PROGRESS NOTES
Chief Complaint   Patient presents with    Migraine     Since Tues. night     1. Have you been to the ER, urgent care clinic since your last visit? Hospitalized since your last visit? No    2. Have you seen or consulted any other health care providers outside of the [address] [place] [address] since your last visit? Include any pap smears or colon screening. No      Chief Complaint   Patient presents with    Migraine     Since Tues. night     She is a 24 y.o. female who presents for evalution. Reviewed PmHx, RxHx, FmHx, SocHx, AllgHx and updated and dated in the chart. Patient Active Problem List    Diagnosis    Attention deficit hyperactivity disorder (ADHD), combined type    Morbid obesity ([facility] [address]    Mild preeclampsia delivered    S/P  section    Term  delivered by  section, current hospitalization       Review of Systems - negative except as listed above in the HPI    Objective:     Vitals:    19 0809   BP: 120/77   Pulse: 76   Resp: 17   Temp: 98.4 °F (36.9 °C)   TempSrc: Oral   SpO2: 97%   Weight: 163 lb 4.8 oz (74.1 kg)   Height: 5' 4\" (1.626 m)     Physical Examination: General appearance - alert, well appearing, and in no distress  Chest - clear to auscultation, no wheezes, rales or rhonchi, symmetric air entry  Heart - normal rate, regular rhythm, normal S1, S2, no murmurs, rubs, clicks or gallops      Assessment/ Plan:   Diagnoses and all orders for this visit:    1. Other migraine without status migrainosus, not intractable  -     SUMAtriptan (IMITREX) 100 mg tablet; Take 1 Tab by mouth once as needed for Migraine for up to 1 dose. And may repeat in 2 hours if not better  -     ketorolac tromethamine (TORADOL) 60 mg/2 mL soln; 2 mL by IntraMUSCular route once for 1 dose. -     KETOROLAC TROMETHAMINE INJ  -     [place] THER/PROPH/DIAG INJECTION, SUBCUT/IM  -add rx           I have discussed the diagnosis with the patient and the intended plan as seen in the above orders. The patient understands and agrees with the plan. The patient has received an after-visit summary and questions were answered concerning future plans. Medication Side Effects and Warnings were discussed with patient  Patient Labs were reviewed and or requested:  Patient Past Records were reviewed and or requested    Elbert Dai M.D. There are no Patient Instructions on file for this visit.

## 2019-09-17 ENCOUNTER — DOCUMENTATION ONLY (OUTPATIENT)
Dept: FAMILY MEDICINE CLINIC | Age: 24
End: 2019-09-17

## 2019-09-17 DIAGNOSIS — F90.2 ATTENTION DEFICIT HYPERACTIVITY DISORDER (ADHD), COMBINED TYPE: ICD-10-CM

## 2019-09-17 RX ORDER — DEXTROAMPHETAMINE SACCHARATE, AMPHETAMINE ASPARTATE, DEXTROAMPHETAMINE SULFATE AND AMPHETAMINE SULFATE 7.5; 7.5; 7.5; 7.5 MG/1; MG/1; MG/1; MG/1
30 TABLET ORAL DAILY
Qty: 30 TAB | Refills: 0 | Status: SHIPPED | OUTPATIENT
Start: 2019-09-17 | End: 2020-01-28 | Stop reason: SDUPTHER

## 2019-09-17 NOTE — PROGRESS NOTES
Called and spoke to patient. Mikel Pollock  Adderall 30 mg  Informed pt script left up front for p/u.

## 2019-10-14 ENCOUNTER — OFFICE VISIT (OUTPATIENT)
Dept: FAMILY MEDICINE CLINIC | Age: 24
End: 2019-10-14

## 2019-10-14 VITALS
TEMPERATURE: 98.3 F | HEART RATE: 86 BPM | OXYGEN SATURATION: 95 % | DIASTOLIC BLOOD PRESSURE: 60 MMHG | HEIGHT: 64 IN | RESPIRATION RATE: 18 BRPM | BODY MASS INDEX: 27.83 KG/M2 | SYSTOLIC BLOOD PRESSURE: 92 MMHG | WEIGHT: 163 LBS

## 2019-10-14 DIAGNOSIS — J01.00 ACUTE MAXILLARY SINUSITIS, RECURRENCE NOT SPECIFIED: Primary | ICD-10-CM

## 2019-10-14 RX ORDER — AZITHROMYCIN 250 MG/1
TABLET, FILM COATED ORAL
Qty: 6 TAB | Refills: 0 | Status: SHIPPED | OUTPATIENT
Start: 2019-10-14 | End: 2019-10-19

## 2019-10-14 NOTE — LETTER
NOTIFICATION RETURN TO WORK / SCHOOL 
 
10/14/2019 8:23 AM 
 
Ms. Yonathan Dash [address] [facility] [address] To Whom It May Concern: 
 
Yonathan Dash is currently under the care of [address]. She will return to work/school on: 10.15.2019 If there are questions or concerns please have the patient contact our office.  
 
 
 
Sincerely, 
 
 
John Bartlett NP

## 2019-10-14 NOTE — PROGRESS NOTES
HPI/ROS  Patient complains of bilateral ear pressure/pain. Symptoms include congestion, headache described as frontal, lightheadedness, low grade fever, post nasal drip, productive cough with  yellow colored sputum, sinus pressure, tooth pain and vertigo. Onset of symptoms was 5 days ago, gradually worsening since that time. Patient is drinking plenty of fluids. .  Past history is significant for no history of pneumonia or bronchitis. Patient is non-smoker. She is not using any otc meds for cough or congestion. Visit Vitals  BP 92/60 (BP 1 Location: Left arm, BP Patient Position: Sitting)   Pulse 86   Temp 98.3 °F (36.8 °C) (Oral)   Resp 18   Ht 5' 4\" (1.626 m)   Wt 163 lb (73.9 kg)   SpO2 95%   BMI 27.98 kg/m²     Physical Examination:   GENERAL ASSESSMENT: well developed and well nourished  SKIN: normal color, no lesions  HEAD: normocephalic  EYES: normal eyes  EARS: external auditory canal: clear and tympanic membrane: yellow, bulging  NOSE: normal external appearance and nares patent  MOUTH: yellow exudates of OP  NECK: normal  CHEST: normal air exchange, no rales, no rhonchi, no wheezes, respiratory effort normal with no retractions  HEART: regular rate and rhythm, normal S1/S2, no murmurs  ABDOMEN:  not examined  EXTREMITY: not examined  NEURO: not examined    Diagnoses and all orders for this visit:    1. Acute maxillary sinusitis, recurrence not specified    Other orders  -     azithromycin (ZITHROMAX) 250 mg tablet; Take 2 tablets today, then take 1 tablet daily      Reveiwed adr/se of medication  Push fluids, rest, suggested mucinex for congestion and drainage  Recheck 5-7 days if sx not improved. I have discussed the diagnosis with the patient and the intended plan as seen in the above orders. The patient has received an after-visit summary and questions were answered concerning future plans. Patient conveyed understanding of the plan at the time of the visit.     Rikki Barthel, MSN, ANP 10/14/2019

## 2019-10-14 NOTE — PROGRESS NOTES
Chief Complaint   Patient presents with    Sinus Pain    Headache     Pt in office today for sinus pain and headaches  -pt states it started yesterday and is worse today  -pt has used a nasal spray  1. Have you been to the ER, urgent care clinic since your last visit? Hospitalized since your last visit? No    2. Have you seen or consulted any other health care providers outside of the [address] [place] [address] since your last visit? Include any pap smears or colon screening.  No     Pt has no other concerns

## 2019-10-24 ENCOUNTER — OFFICE VISIT (OUTPATIENT)
Dept: FAMILY MEDICINE CLINIC | Age: 24
End: 2019-10-24

## 2019-10-24 VITALS
OXYGEN SATURATION: 95 % | HEIGHT: 64 IN | TEMPERATURE: 97.3 F | RESPIRATION RATE: 16 BRPM | WEIGHT: 167 LBS | BODY MASS INDEX: 28.51 KG/M2 | HEART RATE: 96 BPM | DIASTOLIC BLOOD PRESSURE: 67 MMHG | SYSTOLIC BLOOD PRESSURE: 115 MMHG

## 2019-10-24 DIAGNOSIS — F90.2 ATTENTION DEFICIT HYPERACTIVITY DISORDER (ADHD), COMBINED TYPE: Primary | ICD-10-CM

## 2019-10-24 DIAGNOSIS — G43.809 OTHER MIGRAINE WITHOUT STATUS MIGRAINOSUS, NOT INTRACTABLE: ICD-10-CM

## 2019-10-24 DIAGNOSIS — F90.2 ATTENTION DEFICIT HYPERACTIVITY DISORDER (ADHD), COMBINED TYPE: ICD-10-CM

## 2019-10-24 DIAGNOSIS — Z23 ENCOUNTER FOR IMMUNIZATION: ICD-10-CM

## 2019-10-24 RX ORDER — DEXTROAMPHETAMINE SACCHARATE, AMPHETAMINE ASPARTATE, DEXTROAMPHETAMINE SULFATE AND AMPHETAMINE SULFATE 7.5; 7.5; 7.5; 7.5 MG/1; MG/1; MG/1; MG/1
30 TABLET ORAL DAILY
Qty: 30 TAB | Refills: 0 | Status: SHIPPED | OUTPATIENT
Start: 2019-11-23 | End: 2019-12-23

## 2019-10-24 RX ORDER — KETOROLAC TROMETHAMINE 30 MG/ML
30 INJECTION, SOLUTION INTRAMUSCULAR; INTRAVENOUS ONCE
Qty: 1 VIAL | Refills: 0
Start: 2019-10-24 | End: 2019-10-24

## 2019-10-24 RX ORDER — DEXTROAMPHETAMINE SACCHARATE, AMPHETAMINE ASPARTATE, DEXTROAMPHETAMINE SULFATE AND AMPHETAMINE SULFATE 7.5; 7.5; 7.5; 7.5 MG/1; MG/1; MG/1; MG/1
30 TABLET ORAL DAILY
Qty: 30 TAB | Refills: 0 | Status: SHIPPED | OUTPATIENT
Start: 2019-10-24 | End: 2019-10-24 | Stop reason: SDUPTHER

## 2019-10-24 RX ORDER — DEXTROAMPHETAMINE SACCHARATE, AMPHETAMINE ASPARTATE, DEXTROAMPHETAMINE SULFATE AND AMPHETAMINE SULFATE 7.5; 7.5; 7.5; 7.5 MG/1; MG/1; MG/1; MG/1
30 TABLET ORAL DAILY
Qty: 30 TAB | Refills: 0 | Status: SHIPPED | OUTPATIENT
Start: 2019-10-24 | End: 2019-11-23

## 2019-10-24 RX ORDER — SUMATRIPTAN 100 MG/1
100 TABLET, FILM COATED ORAL
Qty: 12 TAB | Refills: 5 | Status: SHIPPED | OUTPATIENT
Start: 2019-10-24 | End: 2019-10-24

## 2019-10-24 RX ORDER — DEXTROAMPHETAMINE SACCHARATE, AMPHETAMINE ASPARTATE, DEXTROAMPHETAMINE SULFATE AND AMPHETAMINE SULFATE 7.5; 7.5; 7.5; 7.5 MG/1; MG/1; MG/1; MG/1
30 TABLET ORAL DAILY
Qty: 30 TAB | Refills: 0 | Status: SHIPPED | OUTPATIENT
Start: 2019-12-23 | End: 2020-01-22

## 2019-10-24 RX ORDER — AMITRIPTYLINE HYDROCHLORIDE 10 MG/1
10 TABLET, FILM COATED ORAL
Qty: 30 TAB | Refills: 11 | Status: SHIPPED | OUTPATIENT
Start: 2019-10-24 | End: 2020-02-17

## 2019-10-24 NOTE — PROGRESS NOTES
Chief Complaint   Patient presents with    Medication Refill    Migraine    Immunization/Injection     Patient presents in office today for med refill of Adderall. Has c/o a migraine. Would like to get a refill of her Imitrex. Would like to get a flu shot. No other concerns. 1. Have you been to the ER, urgent care clinic since your last visit? Hospitalized since your last visit? No    2. Have you seen or consulted any other health care providers outside of the [address] [place] [address] since your last visit? Include any pap smears or colon screening.  No    Learning Assessment 3/15/2017   PRIMARY LEARNER Patient   HIGHEST LEVEL OF EDUCATION - PRIMARY LEARNER  SOME COLLEGE   BARRIERS PRIMARY LEARNER -   CO-LEARNER CAREGIVER -   PRIMARY LANGUAGE ENGLISH   LEARNER PREFERENCE PRIMARY DEMONSTRATION   ANSWERED BY patient   RELATIONSHIP SELF

## 2019-10-24 NOTE — PATIENT INSTRUCTIONS
A Healthy Lifestyle: Care Instructions  Your Care Instructions    A healthy lifestyle can help you feel good, stay at a healthy weight, and have plenty of energy for both work and play. A healthy lifestyle is something you can share with your whole family. A healthy lifestyle also can lower your risk for serious health problems, such as high blood pressure, heart disease, and diabetes. You can follow a few steps listed below to improve your health and the health of your family. Follow-up care is a key part of your treatment and safety. Be sure to make and go to all appointments, and call your doctor if you are having problems. It's also a good idea to know your test results and keep a list of the medicines you take. How can you care for yourself at home? · Do not eat too much sugar, fat, or fast foods. You can still have dessert and treats now and then. The goal is moderation. · Start small to improve your eating habits. Pay attention to portion sizes, drink less juice and soda pop, and eat more fruits and vegetables. ? Eat a healthy amount of food. A 3-ounce serving of meat, for example, is about the size of a deck of cards. Fill the rest of your plate with vegetables and whole grains. ? Limit the amount of soda and sports drinks you have every day. Drink more water when you are thirsty. ? Eat at least 5 servings of fruits and vegetables every day. It may seem like a lot, but it is not hard to reach this goal. A serving or helping is 1 piece of fruit, 1 cup of vegetables, or 2 cups of leafy, raw vegetables. Have an apple or some carrot sticks as an afternoon snack instead of a candy bar. Try to have fruits and/or vegetables at every meal.  · Make exercise part of your daily routine. You may want to start with simple activities, such as walking, bicycling, or slow swimming. Try to be active 30 to 60 minutes every day. You do not need to do all 30 to 60 minutes all at once.  For example, you can exercise 3 times a day for 10 or 20 minutes. Moderate exercise is safe for most people, but it is always a good idea to talk to your doctor before starting an exercise program.  · Keep moving. Sabine [employer] the lawn, work in the garden, or [employer]. Take the stairs instead of the elevator at work. · If you smoke, quit. People who smoke have an increased risk for heart attack, stroke, cancer, and other lung illnesses. Quitting is hard, but there are ways to boost your chance of quitting tobacco for good. ? Use nicotine gum, patches, or lozenges. ? Ask your doctor about stop-smoking programs and medicines. ? Keep trying. In addition to reducing your risk of diseases in the future, you will notice some benefits soon after you stop using tobacco. If you have shortness of breath or asthma symptoms, they will likely get better within a few weeks after you quit. · Limit how much alcohol you drink. Moderate amounts of alcohol (up to 2 drinks a day for men, 1 drink a day for women) are okay. But drinking too much can lead to liver problems, high blood pressure, and other health problems. Family health  If you have a family, there are many things you can do together to improve your health. · Eat meals together as a family as often as possible. · Eat healthy foods. This includes fruits, vegetables, lean meats and dairy, and whole grains. · Include your family in your fitness plan. Most people think of activities such as jogging or tennis as the way to fitness, but there are many ways you and your family can be more active. Anything that makes you breathe hard and gets your heart pumping is exercise. Here are some tips:  ? Walk to do errands or to take your child to school or the bus.  ? Go for a family bike ride after dinner instead of watching TV. Where can you learn more? Go to http://luly-donavon.info/. Enter L745 in the search box to learn more about \"A Healthy Lifestyle: Care Instructions. \"  Current as of: May 28, 2019  Content Version: 12.2  © 0631-4025 [employer]. Care instructions adapted under license by [employer] (which disclaims liability or warranty for this information). If you have questions about a medical condition or this instruction, always ask your healthcare professional. [address] any warranty or liability for your use of this information.

## 2019-10-24 NOTE — PROGRESS NOTES
Ziyad Ferrara is a 21 y.o. female   Chief Complaint   Patient presents with    Medication Refill    Migraine    Immunization/Injection    pt here for follow up for a refill of adderall and this is working well for her ADD. Pt also with a  Migraine and states currently a 7/10 going on 2 days. She is out of imitrex and needs a refill. Gets migraines a few times a month and wants something to prevent. Pt would like to trial this.  checked. she is a 21y.o. year old female who presents for evalution. Reviewed PmHx, RxHx, FmHx, SocHx, AllgHx and updated and dated in the chart. Review of Systems - negative except as listed above in the HPI    Objective:     Vitals:    10/24/19 1354   BP: 115/67   Pulse: 96   Resp: 16   Temp: 97.3 °F (36.3 °C)   TempSrc: Oral   SpO2: 95%   Weight: 167 lb (75.8 kg)   Height: 5' 4\" (1.626 m)       Current Outpatient Medications   Medication Sig    [START ON 12/23/2019] dextroamphetamine-amphetamine (ADDERALL) 30 mg tablet Take 1 Tab by mouth daily for 30 days. Max Daily Amount: 1 Tab.  [START ON 11/23/2019] dextroamphetamine-amphetamine (ADDERALL) 30 mg tablet Take 1 Tab by mouth daily for 30 days. Max Daily Amount: 1 Tab. Indications: Attention Deficit Disorder with Hyperactivity    dextroamphetamine-amphetamine (ADDERALL) 30 mg tablet Take 1 Tab by mouth daily for 30 days. Max Daily Amount: 1 Tab.  SUMAtriptan (IMITREX) 100 mg tablet Take 1 Tab by mouth once as needed for Migraine for up to 1 dose. And may repeat in 2 hours if not better    ketorolac (TORADOL) 30 mg/mL (1 mL) injection 1 mL by IntraVENous route once for 1 dose.  amitriptyline (ELAVIL) 10 mg tablet Take 1 Tab by mouth nightly.  dextroamphetamine-amphetamine (ADDERALL) 30 mg tablet Take 1 Tab by mouth daily. Max Daily Amount: 1 Tab.  ondansetron (ZOFRAN ODT) 4 mg disintegrating tablet Take 1 Tab by mouth every eight (8) hours as needed for Nausea.     ALPRAZolam (XANAX) 1 mg tablet TAKE 1 TABLET BY MOUTH DAILY AS NEEDED FOR ANXIETY    dextroamphetamine-amphetamine (ADDERALL) 30 mg tablet Take 1 Tab by mouth daily. Max Daily Amount: 1 Tab.  dextroamphetamine-amphetamine (ADDERALL) 30 mg tablet Take 1 Tab by mouth daily. Max Daily Amount: 1 Tab. Indications: Attention Deficit Disorder with Hyperactivity    pantoprazole (PROTONIX) 40 mg tablet Take 1 Tab by mouth daily. Indications: heartburn     No current facility-administered medications for this visit. Physical Examination: General appearance - alert, well appearing, and in no distress  Mental status - alert, oriented to person, place, and time  Chest - clear to auscultation, no wheezes, rales or rhonchi, symmetric air entry  Heart - normal rate, regular rhythm, normal S1, S2, no murmurs, rubs, clicks or gallops      Assessment/ Plan:   Diagnoses and all orders for this visit:    1. Attention deficit hyperactivity disorder (ADHD), combined type  -     dextroamphetamine-amphetamine (ADDERALL) 30 mg tablet; Take 1 Tab by mouth daily for 30 days. Max Daily Amount: 1 Tab.  -     dextroamphetamine-amphetamine (ADDERALL) 30 mg tablet; Take 1 Tab by mouth daily for 30 days. Max Daily Amount: 1 Tab. Indications: Attention Deficit Disorder with Hyperactivity  -     dextroamphetamine-amphetamine (ADDERALL) 30 mg tablet; Take 1 Tab by mouth daily for 30 days. Max Daily Amount: 1 Tab. 2. Other migraine without status migrainosus, not intractable  -     SUMAtriptan (IMITREX) 100 mg tablet; Take 1 Tab by mouth once as needed for Migraine for up to 1 dose. And may repeat in 2 hours if not better  -     ketorolac (TORADOL) 30 mg/mL (1 mL) injection; 1 mL by IntraVENous route once for 1 dose. -     KETOROLAC TROMETHAMINE INJ  -     AZ THER/PROPH/DIAG INJECTION, SUBCUT/IM  -     amitriptyline (ELAVIL) 10 mg tablet; Take 1 Tab by mouth nightly.     3. Encounter for immunization  -     INFLUENZA VIRUS VAC QUAD,SPLIT,PRESV FREE SYRINGE IM  -     AZ IMMUNIZ ADMIN,1 SINGLE/COMB VAC/TOXOID       Follow-up and Dispositions    · Return in about 3 months (around 1/24/2020), or if symptoms worsen or fail to improve. I have discussed the diagnosis with the patient and the intended plan as seen in the above orders. The patient has received an after-visit summary and questions were answered concerning future plans. Pt conveyed understanding of plan.     Medication Side Effects and Warnings were discussed with patient      Jovan Galan, DO

## 2019-10-25 ENCOUNTER — TELEPHONE (OUTPATIENT)
Dept: FAMILY MEDICINE CLINIC | Age: 24
End: 2019-10-25

## 2019-10-25 NOTE — TELEPHONE ENCOUNTER
LVM to disregard my previous message about her rx ready for pick-up. Patient came in to see  yesterday and he refilled 3 adderall rx during visit. Current printed rx by Dr. Luis Alberto Santos voided and shredded.

## 2019-11-15 DIAGNOSIS — R10.84 GENERALIZED ABDOMINAL PAIN: ICD-10-CM

## 2019-11-15 RX ORDER — PANTOPRAZOLE SODIUM 40 MG/1
40 TABLET, DELAYED RELEASE ORAL DAILY
Qty: 30 TAB | Refills: 3 | Status: SHIPPED | OUTPATIENT
Start: 2019-11-15 | End: 2020-01-28 | Stop reason: SDUPTHER

## 2019-11-25 RX ORDER — LORATADINE 10 MG/1
10 TABLET ORAL DAILY
Qty: 30 TAB | Refills: 11 | Status: SHIPPED | OUTPATIENT
Start: 2019-11-25 | End: 2020-02-17

## 2019-11-25 RX ORDER — AZITHROMYCIN 250 MG/1
TABLET, FILM COATED ORAL
Qty: 6 TAB | Refills: 0 | Status: SHIPPED | OUTPATIENT
Start: 2019-11-25 | End: 2020-01-28

## 2019-11-25 RX ORDER — FLUCONAZOLE 150 MG/1
150 TABLET ORAL DAILY
Qty: 1 TAB | Refills: 5 | Status: SHIPPED | OUTPATIENT
Start: 2019-11-25 | End: 2019-11-26

## 2020-01-14 RX ORDER — OSELTAMIVIR PHOSPHATE 75 MG/1
75 CAPSULE ORAL 2 TIMES DAILY
Qty: 10 CAP | Refills: 0 | Status: SHIPPED | OUTPATIENT
Start: 2020-01-14 | End: 2020-01-19

## 2020-01-28 ENCOUNTER — OFFICE VISIT (OUTPATIENT)
Dept: FAMILY MEDICINE CLINIC | Age: 25
End: 2020-01-28

## 2020-01-28 VITALS
DIASTOLIC BLOOD PRESSURE: 67 MMHG | TEMPERATURE: 97.8 F | OXYGEN SATURATION: 98 % | RESPIRATION RATE: 16 BRPM | BODY MASS INDEX: 29.98 KG/M2 | HEIGHT: 64 IN | SYSTOLIC BLOOD PRESSURE: 102 MMHG | HEART RATE: 98 BPM | WEIGHT: 175.6 LBS

## 2020-01-28 DIAGNOSIS — R10.84 GENERALIZED ABDOMINAL PAIN: ICD-10-CM

## 2020-01-28 DIAGNOSIS — F90.2 ATTENTION DEFICIT HYPERACTIVITY DISORDER (ADHD), COMBINED TYPE: ICD-10-CM

## 2020-01-28 DIAGNOSIS — F41.1 GAD (GENERALIZED ANXIETY DISORDER): ICD-10-CM

## 2020-01-28 RX ORDER — DEXTROAMPHETAMINE SACCHARATE, AMPHETAMINE ASPARTATE, DEXTROAMPHETAMINE SULFATE AND AMPHETAMINE SULFATE 7.5; 7.5; 7.5; 7.5 MG/1; MG/1; MG/1; MG/1
30 TABLET ORAL DAILY
Qty: 30 TAB | Refills: 0 | Status: SHIPPED | OUTPATIENT
Start: 2020-02-28 | End: 2020-04-15 | Stop reason: SDUPTHER

## 2020-01-28 RX ORDER — DEXTROAMPHETAMINE SACCHARATE, AMPHETAMINE ASPARTATE, DEXTROAMPHETAMINE SULFATE AND AMPHETAMINE SULFATE 7.5; 7.5; 7.5; 7.5 MG/1; MG/1; MG/1; MG/1
30 TABLET ORAL DAILY
Qty: 30 TAB | Refills: 0 | Status: SHIPPED | OUTPATIENT
Start: 2020-03-28 | End: 2020-04-15 | Stop reason: SDUPTHER

## 2020-01-28 RX ORDER — ALPRAZOLAM 1 MG/1
TABLET ORAL
Qty: 10 TAB | Refills: 0 | Status: SHIPPED | OUTPATIENT
Start: 2020-01-28 | End: 2020-06-25 | Stop reason: SDUPTHER

## 2020-01-28 RX ORDER — PANTOPRAZOLE SODIUM 40 MG/1
40 TABLET, DELAYED RELEASE ORAL DAILY
Qty: 90 TAB | Refills: 3 | Status: SHIPPED | OUTPATIENT
Start: 2020-01-28 | End: 2020-04-15 | Stop reason: SDUPTHER

## 2020-01-28 RX ORDER — DEXTROAMPHETAMINE SACCHARATE, AMPHETAMINE ASPARTATE, DEXTROAMPHETAMINE SULFATE AND AMPHETAMINE SULFATE 7.5; 7.5; 7.5; 7.5 MG/1; MG/1; MG/1; MG/1
30 TABLET ORAL DAILY
Qty: 30 TAB | Refills: 0 | Status: SHIPPED | OUTPATIENT
Start: 2020-01-28 | End: 2020-04-15 | Stop reason: SDUPTHER

## 2020-01-28 NOTE — PROGRESS NOTES
Chief Complaint   Patient presents with    Anxiety    Attention Deficit Disorder    GERD    Medication Refill     1. Have you been to the ER, urgent care clinic since your last visit? Hospitalized since your last visit? Yes Where: [facility] ED : Milady Marte, 2019 US done: Abdo pain    2. Have you seen or consulted any other health care providers outside of the [address] [place] [address] since your last visit? Include any pap smears or colon screening. No       Chief Complaint   Patient presents with    Anxiety    Attention Deficit Disorder    GERD    Medication Refill     She is a 21 y.o. female who presents for evalution. Reviewed PmHx, RxHx, FmHx, SocHx, AllgHx and updated and dated in the chart. Patient Active Problem List    Diagnosis    Attention deficit hyperactivity disorder (ADHD), combined type    Morbid obesity ([facility] [address]    Mild preeclampsia delivered    S/P  section    Term  delivered by  section, current hospitalization       Review of Systems - negative except as listed above in the HPI    Objective:     Vitals:    20 1447   BP: 102/67   Pulse: 98   Resp: 16   Temp: 97.8 °F (36.6 °C)   TempSrc: Oral   SpO2: 98%   Weight: 175 lb 9.6 oz (79.7 kg)   Height: 5' 4\" (1.626 m)     Physical Examination: General appearance - alert, well appearing, and in no distress  Chest - clear to auscultation, no wheezes, rales or rhonchi, symmetric air entry  Heart - normal rate, regular rhythm, normal S1, S2, no murmurs, rubs, clicks or gallops      Assessment/ Plan:   Diagnoses and all orders for this visit:    1. Generalized abdominal pain  -     pantoprazole (PROTONIX) 40 mg tablet; Take 1 Tab by mouth daily. Indications: heartburn    2. Attention deficit hyperactivity disorder (ADHD), combined type  -     dextroamphetamine-amphetamine (ADDERALL) 30 mg tablet; Take 1 Tab by mouth daily.  Max Daily Amount: 1 Tab.  -     dextroamphetamine-amphetamine (ADDERALL) 30 mg tablet; Take 1 Tab by mouth daily. Max Daily Amount: 1 Tab. Indications: attention deficit disorder with hyperactivity  -     dextroamphetamine-amphetamine (ADDERALL) 30 mg tablet; Take 1 Tab by mouth daily. Max Daily Amount: 1 Tab. -stable    3. ROSALIO (generalized anxiety disorder)  -     ALPRAZolam (XANAX) 1 mg tablet; TAKE 1 TABLET BY MOUTH DAILY AS NEEDED FOR ANXIETY       Follow-up and Dispositions    · Return in about 3 months (around 4/28/2020) for add. I have discussed the diagnosis with the patient and the intended plan as seen in the above orders. The patient understands and agrees with the plan. The patient has received an after-visit summary and questions were answered concerning future plans. Medication Side Effects and Warnings were discussed with patient  Patient Labs were reviewed and or requested:  Patient Past Records were reviewed and or requested    Andrade King M.D. There are no Patient Instructions on file for this visit.

## 2020-02-13 RX ORDER — AZITHROMYCIN 250 MG/1
TABLET, FILM COATED ORAL
Qty: 6 TAB | Refills: 0 | Status: SHIPPED | OUTPATIENT
Start: 2020-02-13 | End: 2020-10-22 | Stop reason: ALTCHOICE

## 2020-02-17 ENCOUNTER — OFFICE VISIT (OUTPATIENT)
Dept: FAMILY MEDICINE CLINIC | Age: 25
End: 2020-02-17

## 2020-02-17 VITALS
OXYGEN SATURATION: 98 % | BODY MASS INDEX: 30.22 KG/M2 | HEIGHT: 64 IN | RESPIRATION RATE: 18 BRPM | DIASTOLIC BLOOD PRESSURE: 82 MMHG | SYSTOLIC BLOOD PRESSURE: 139 MMHG | HEART RATE: 88 BPM | TEMPERATURE: 97.8 F | WEIGHT: 177 LBS

## 2020-02-17 DIAGNOSIS — E04.9 ENLARGED THYROID GLAND: ICD-10-CM

## 2020-02-17 DIAGNOSIS — G44.52 NEW DAILY PERSISTENT HEADACHE: Primary | ICD-10-CM

## 2020-02-17 DIAGNOSIS — R59.0 LYMPHADENOPATHY OF HEAD AND NECK REGION: ICD-10-CM

## 2020-02-17 NOTE — PROGRESS NOTES
Chief Complaint   Patient presents with    Skin Problem     Patient presents during walk in clinic for skin problem that was noted 8wks ago. Pt states around Padmini found 2 knots on right side of neck. Pt have since noted knots have increased and became more tender. Have treated with hot compresses,with slight relief in pain        1. Have you been to the ER, urgent care clinic since your last visit? Hospitalized since your last visit? No    2. Have you seen or consulted any other health care providers outside of the [address] [place] [address] since your last visit? Include any pap smears or colon screening.  No

## 2020-02-17 NOTE — PROGRESS NOTES
Chief Complaint   Patient presents with    Skin Problem     Patient presents during walk in clinic for skin problem that was noted 8 wks ago. Pt states around Padmini found 2 knots on right side of neck. Pt have since noted knots have increased in size and became more tender. Have treated with hot compresses, with slight relief in pain. Tender to the touch. Pt is on her last day of zithromax for cold that was prescribed by Dr. Rick Young. Denies any cold sx or illness prior to noticing the nodules. Denies any fevers or chills. Has been having more headaches and fatigue than usual but could be r/t her higher stress levels. HAs are in the frontal region. Usually has a HA every day and usually the same time of day every day. Usually between 2-3 PM. Pt works nights. Will wake up during her night time (2 pm) from the HA and take an aleve which helps. This has been going on for about a month now. Reports phono and photophobia with the HAs. Does get associated nausea without vomiting. Comes and goes. Depends on the severity of the HA. When she was in middle school, used to get frequent migraine HAs. Denies any family history of migraines. Pt was living with her mom in a stressful env but moved out which helped. Felt like she was constantly walking on egg shells. Denies any other concerns at this time. Chief Complaint   Patient presents with    Skin Problem     she is a 21y.o. year old female who presents for evalution. Reviewed PmHx, RxHx, FmHx, SocHx, AllgHx and updated and dated in the chart.     Review of Systems - negative except as listed above in the HPI    Objective:     Vitals:    02/17/20 0743   BP: 139/82   Pulse: 88   Resp: 18   Temp: 97.8 °F (36.6 °C)   TempSrc: Oral   SpO2: 98%   Weight: 177 lb (80.3 kg)   Height: 5' 4\" (1.626 m)     Physical Examination: General appearance - alert, well appearing, and in no distress  Mental status - normal mood, behavior, speech, dress, motor activity, and thought processes  Eyes - pupils equal and reactive, extraocular eye movements intact  Ears - bilateral TM's and external ear canals normal  Nose - normal and patent, no erythema, discharge or polyps and normal nontender sinuses  Mouth - mucous membranes moist, pharynx normal without lesions  Neck - carotids upstroke normal bilaterally, no bruits, thyroid exam: thyroid enlarged, smooth, nontender, no nodules, 2 discrete nodules present right suboccipital region that are slightly tender, slightly mobile, with no overlying skin changes or other surrounding palpable abnormality  Chest - clear to auscultation, no wheezes, rales or rhonchi, symmetric air entry  Heart - normal rate, regular rhythm, normal S1, S2, no murmurs  Extremities - peripheral pulses normal, no edema, no clubbing or cyanosis  Skin - normal coloration and turgor, no rashes, no suspicious skin lesions noted    Assessment/ Plan:   Diagnoses and all orders for this visit:    1. New daily persistent headache  -     METABOLIC PANEL, COMPREHENSIVE  -     CBC WITH AUTOMATED DIFF  -     VITAMIN D, 25 HYDROXY; Future  -     HEMOGLOBIN A1C WITH EAG  Will notify results and deviate plan based on findings. Enc pt to keep a HA diary. 2. Lymphadenopathy of head and neck region  -     CBC WITH AUTOMATED DIFF  -     TSH 3RD GENERATION  -     THYROID ANTIBODY PANEL; Future  -     T4, FREE; Future  -     US HEAD NECK SOFT TISSUE; Future  Recommended US and labs for further evaluation. 3. Enlarged thyroid gland  -     US THYROID/PARATHYROID/SOFT TISS; Future  Will notify results and deviate plan based on findings. Follow-up and Dispositions    · Return if symptoms worsen or fail to improve. I have discussed the diagnosis with the patient and the intended plan as seen in the above orders. The patient has received an after-visit summary and questions were answered concerning future plans.      Medication Side Effects and Warnings were discussed with patient: yes  Patient Labs were reviewed and or requested: yes  Patient Past Records were reviewed and or requested  yes  Patient / Caregiver Understanding of treatment plan was verbalized during office visit YES    HALEY Araujo    There are no Patient Instructions on file for this visit.

## 2020-02-17 NOTE — PATIENT INSTRUCTIONS
Migraine Headache: Care Instructions  Your Care Instructions  Migraines are painful, throbbing headaches that often start on one side of the head. They may cause nausea and vomiting and make you sensitive to light, sound, or smell. Without treatment, migraines can last from 4 hours to a few days. Medicines can help prevent migraines or stop them after they have started. Your doctor can help you find which ones work best for you. Follow-up care is a key part of your treatment and safety. Be sure to make and go to all appointments, and call your doctor if you are having problems. It's also a good idea to know your test results and keep a list of the medicines you take. How can you care for yourself at home? · Do not drive if you have taken a prescription pain medicine. · Rest in a quiet, dark room until your headache is gone. Close your eyes, and try to relax or go to sleep. Don't watch TV or read. · Put a cold, moist cloth or cold pack on the painful area for 10 to 20 minutes at a time. Put a thin cloth between the cold pack and your skin. · Use a warm, moist towel or a heating pad set on low to relax tight shoulder and neck muscles. · Have someone gently massage your neck and shoulders. · Take your medicines exactly as prescribed. Call your doctor if you think you are having a problem with your medicine. You will get more details on the specific medicines your doctor prescribes. · Be careful not to take pain medicine more often than the instructions allow. You could get worse or more frequent headaches when the medicine wears off. To prevent migraines  · Keep a headache diary so you can figure out what triggers your headaches. Avoiding triggers may help you prevent headaches. Record when each headache began, how long it lasted, and what the pain was like.  (Was it throbbing, aching, stabbing, or dull?) Write down any other symptoms you had with the headache, such as nausea, flashing lights or dark spots, or sensitivity to bright light or loud noise. Note if the headache occurred near your period. List anything that might have triggered the headache. Triggers may include certain foods (chocolate, cheese, wine) or odors, smoke, bright light, stress, or lack of sleep. · If your doctor has prescribed medicine for your migraines, take it as directed. You may have medicine that you take only when you get a migraine and medicine that you take all the time to help prevent migraines. ? If your doctor has prescribed medicine for when you get a headache, take it at the first sign of a migraine, unless your doctor has given you other instructions. ? If your doctor has prescribed medicine to prevent migraines, take it exactly as prescribed. Call your doctor if you think you are having a problem with your medicine. · Find healthy ways to deal with stress. Migraines are most common during or right after stressful times. Take time to relax before and after you do something that has caused a migraine in the past.  · Try to keep your muscles relaxed by keeping good posture. Check your jaw, face, neck, and shoulder muscles for tension. Try to relax them. When you sit at a desk, change positions often. And make sure to stretch for 30 seconds each hour. · Get plenty of sleep and exercise. · Eat meals on a regular schedule. Avoid foods and drinks that often trigger migraines. These include chocolate, alcohol (especially red wine and port), aspartame, monosodium glutamate (MSG), and some additives found in foods (such as hot dogs, mackenzie, cold cuts, aged cheeses, and pickled foods). · Limit caffeine. Don't drink too much coffee, tea, or soda. But don't quit caffeine suddenly. That can also give you migraines. · Do not smoke or allow others to smoke around you. If you need help quitting, talk to your doctor about stop-smoking programs and medicines. These can increase your chances of quitting for good.   · If you are taking birth control pills or hormone therapy, talk to your doctor about whether they are triggering your migraines. When should you call for help? Call 911 anytime you think you may need emergency care. For example, call if:    · You have signs of a stroke. These may include:  ? Sudden numbness, paralysis, or weakness in your face, arm, or leg, especially on only one side of your body. ? Sudden vision changes. ? Sudden trouble speaking. ? Sudden confusion or trouble understanding simple statements. ? Sudden problems with walking or balance. ? A sudden, severe headache that is different from past headaches.    Call your doctor now or seek immediate medical care if:    · You have new or worse nausea and vomiting.     · You have a new or higher fever.     · Your headache gets much worse.    Watch closely for changes in your health, and be sure to contact your doctor if:    · You are not getting better after 2 days (48 hours). Where can you learn more? Go to http://luly-donavon.info/. Enter J575 in the search box to learn more about \"Migraine Headache: Care Instructions. \"  Current as of: March 28, 2019  Content Version: 12.2  © 6323-5342 [employer]. Care instructions adapted under license by [employer] (which disclaims liability or warranty for this information). If you have questions about a medical condition or this instruction, always ask your healthcare professional. [address] any warranty or liability for your use of this information.

## 2020-02-18 LAB
25(OH)D3+25(OH)D2 SERPL-MCNC: 25.8 NG/ML (ref 30–100)
ALBUMIN SERPL-MCNC: 4.2 G/DL (ref 3.9–5)
ALBUMIN/GLOB SERPL: 1.8 {RATIO} (ref 1.2–2.2)
ALP SERPL-CCNC: 78 IU/L (ref 39–117)
ALT SERPL-CCNC: 43 IU/L (ref 0–32)
AST SERPL-CCNC: 31 IU/L (ref 0–40)
BASOPHILS # BLD AUTO: 0.1 X10E3/UL (ref 0–0.2)
BASOPHILS NFR BLD AUTO: 1 %
BILIRUB SERPL-MCNC: 0.4 MG/DL (ref 0–1.2)
BUN SERPL-MCNC: 10 MG/DL (ref 6–20)
BUN/CREAT SERPL: 14 (ref 9–23)
CALCIUM SERPL-MCNC: 8.9 MG/DL (ref 8.7–10.2)
CHLORIDE SERPL-SCNC: 104 MMOL/L (ref 96–106)
CO2 SERPL-SCNC: 22 MMOL/L (ref 20–29)
CREAT SERPL-MCNC: 0.7 MG/DL (ref 0.57–1)
EOSINOPHIL # BLD AUTO: 0.4 X10E3/UL (ref 0–0.4)
EOSINOPHIL NFR BLD AUTO: 4 %
ERYTHROCYTE [DISTWIDTH] IN BLOOD BY AUTOMATED COUNT: 11.9 % (ref 11.7–15.4)
EST. AVERAGE GLUCOSE BLD GHB EST-MCNC: 97 MG/DL
GLOBULIN SER CALC-MCNC: 2.4 G/DL (ref 1.5–4.5)
GLUCOSE SERPL-MCNC: 87 MG/DL (ref 65–99)
HBA1C MFR BLD: 5 % (ref 4.8–5.6)
HCT VFR BLD AUTO: 41 % (ref 34–46.6)
HGB BLD-MCNC: 13.6 G/DL (ref 11.1–15.9)
IMM GRANULOCYTES # BLD AUTO: 0 X10E3/UL (ref 0–0.1)
IMM GRANULOCYTES NFR BLD AUTO: 0 %
LYMPHOCYTES # BLD AUTO: 5 X10E3/UL (ref 0.7–3.1)
LYMPHOCYTES NFR BLD AUTO: 53 %
MCH RBC QN AUTO: 30.2 PG (ref 26.6–33)
MCHC RBC AUTO-ENTMCNC: 33.2 G/DL (ref 31.5–35.7)
MCV RBC AUTO: 91 FL (ref 79–97)
MONOCYTES # BLD AUTO: 0.7 X10E3/UL (ref 0.1–0.9)
MONOCYTES NFR BLD AUTO: 8 %
MORPHOLOGY BLD-IMP: ABNORMAL
NEUTROPHILS # BLD AUTO: 3.1 X10E3/UL (ref 1.4–7)
NEUTROPHILS NFR BLD AUTO: 34 %
PLATELET # BLD AUTO: 236 X10E3/UL (ref 150–450)
POTASSIUM SERPL-SCNC: 4 MMOL/L (ref 3.5–5.2)
PROT SERPL-MCNC: 6.6 G/DL (ref 6–8.5)
RBC # BLD AUTO: 4.51 X10E6/UL (ref 3.77–5.28)
SODIUM SERPL-SCNC: 139 MMOL/L (ref 134–144)
T4 FREE SERPL-MCNC: 1.38 NG/DL (ref 0.82–1.77)
THYROGLOB AB SERPL-ACNC: <1 IU/ML (ref 0–0.9)
THYROPEROXIDASE AB SERPL-ACNC: 12 IU/ML (ref 0–34)
TSH SERPL DL<=0.005 MIU/L-ACNC: 1.78 UIU/ML (ref 0.45–4.5)
WBC # BLD AUTO: 9.2 X10E3/UL (ref 3.4–10.8)

## 2020-02-18 NOTE — PROGRESS NOTES
The following message was sent to pt via [employer] portal in reference to lab results:    Good afternoon Ms. Nandini Lua are the results of your most recent lab work. I have the following recommendations:    1. Your CBC which looks at your white blood cells, red blood cells, and hemoglobin came back looking normal. No sign of infection or anemia. 2. Your metabolic panel which looks at your blood glucose, liver function, and kidney function looks good minus one of your liver function tests (ALT) being a little elevate. I'd like you to follow up in 4 weeks to recheck this to make sure that it normalizes. 3. Your TSH, free T4 and thyroid antibody profile which we checked to screen for thyroid disease all came back normal. This means you do not have hyper or hypothyroidism. 4. Your vitamin D level is ever so slightly low. I recommend you take a once daily 2,000 or 5,000 international unit vitamin D supplement daily. You can purchase this over the counter. Please let me know if you have any questions or concerns regarding these results. Lets recheck these labs in 4 weeks. We can also follow up on your headaches and other symptoms at that time.     HALEY Diaz

## 2020-02-27 ENCOUNTER — DOCUMENTATION ONLY (OUTPATIENT)
Dept: FAMILY MEDICINE CLINIC | Age: 25
End: 2020-02-27

## 2020-03-23 RX ORDER — ONDANSETRON 4 MG/1
4 TABLET, ORALLY DISINTEGRATING ORAL
Qty: 20 TAB | Refills: 0 | Status: SHIPPED | OUTPATIENT
Start: 2020-03-23 | End: 2020-09-03 | Stop reason: SDUPTHER

## 2020-04-15 ENCOUNTER — VIRTUAL VISIT (OUTPATIENT)
Dept: FAMILY MEDICINE CLINIC | Age: 25
End: 2020-04-15

## 2020-04-15 DIAGNOSIS — F90.2 ATTENTION DEFICIT HYPERACTIVITY DISORDER (ADHD), COMBINED TYPE: ICD-10-CM

## 2020-04-15 DIAGNOSIS — R10.84 GENERALIZED ABDOMINAL PAIN: ICD-10-CM

## 2020-04-15 RX ORDER — DEXTROAMPHETAMINE SACCHARATE, AMPHETAMINE ASPARTATE, DEXTROAMPHETAMINE SULFATE AND AMPHETAMINE SULFATE 7.5; 7.5; 7.5; 7.5 MG/1; MG/1; MG/1; MG/1
30 TABLET ORAL DAILY
Qty: 30 TAB | Refills: 0 | Status: SHIPPED | OUTPATIENT
Start: 2020-06-15 | End: 2020-06-25 | Stop reason: SDUPTHER

## 2020-04-15 RX ORDER — DEXTROAMPHETAMINE SACCHARATE, AMPHETAMINE ASPARTATE, DEXTROAMPHETAMINE SULFATE AND AMPHETAMINE SULFATE 7.5; 7.5; 7.5; 7.5 MG/1; MG/1; MG/1; MG/1
30 TABLET ORAL DAILY
Qty: 30 TAB | Refills: 0 | Status: SHIPPED | OUTPATIENT
Start: 2020-04-15 | End: 2020-10-06

## 2020-04-15 RX ORDER — DEXTROAMPHETAMINE SACCHARATE, AMPHETAMINE ASPARTATE, DEXTROAMPHETAMINE SULFATE AND AMPHETAMINE SULFATE 7.5; 7.5; 7.5; 7.5 MG/1; MG/1; MG/1; MG/1
30 TABLET ORAL DAILY
Qty: 30 TAB | Refills: 0 | Status: SHIPPED | OUTPATIENT
Start: 2020-05-15 | End: 2020-10-06

## 2020-04-15 RX ORDER — PANTOPRAZOLE SODIUM 40 MG/1
40 TABLET, DELAYED RELEASE ORAL DAILY
Qty: 30 TAB | Refills: 5 | Status: SHIPPED | OUTPATIENT
Start: 2020-04-15 | End: 2020-10-22

## 2020-04-15 NOTE — PROGRESS NOTES
Here for VV    Would like refill of add rx and gerd rx    Consent: Cecilio Rubi, who was seen by synchronous (real-time) audio-video technology, and/or her healthcare decision maker, is aware that this patient-initiated, Telehealth encounter on 4/15/2020 is a billable service, with coverage as determined by her insurance carrier. She is aware that she may receive a bill and has provided verbal consent to proceed: Yes. 712  Subjective:   Cecilio Rubi is a 21 y.o. female who was seen for No chief complaint on file. Prior to Admission medications    Medication Sig Start Date End Date Taking? Authorizing Provider   pantoprazole (PROTONIX) 40 mg tablet Take 1 Tab by mouth daily. Indications: heartburn 4/15/20  Yes Richardine Primrose, MD   dextroamphetamine-amphetamine (ADDERALL) 30 mg tablet Take 1 Tab by mouth daily. Max Daily Amount: 1 Tab. Indications: attention deficit disorder with hyperactivity 4/15/20  Yes Richardine Primrose, MD   dextroamphetamine-amphetamine (ADDERALL) 30 mg tablet Take 1 Tab by mouth daily. Max Daily Amount: 1 Tab. 6/15/20  Yes Richardine Primrose, MD   dextroamphetamine-amphetamine (ADDERALL) 30 mg tablet Take 1 Tab by mouth daily. Max Daily Amount: 1 Tab. 5/15/20  Yes Richardine Primrose, MD   ondansetron (ZOFRAN ODT) 4 mg disintegrating tablet Take 1 Tab by mouth every eight (8) hours as needed for Nausea or Vomiting. 3/23/20   Olaf Joaquin NP   azithromycin (ZITHROMAX) 250 mg tablet Take two tablets today then one tablet daily 2/13/20   Richardine Primrose, MD   ALPRAZolam Renny Alfonso) 1 mg tablet TAKE 1 TABLET BY MOUTH DAILY AS NEEDED FOR ANXIETY 1/28/20   Richardine Primrose, MD   pantoprazole (PROTONIX) 40 mg tablet Take 1 Tab by mouth daily. Indications: heartburn 1/28/20 4/15/20  Richardine Primrose, MD   dextroamphetamine-amphetamine (ADDERALL) 30 mg tablet Take 1 Tab by mouth daily.  Max Daily Amount: 1 Tab. 3/28/20 4/15/20  Richardine Primrose, MD   dextroamphetamine-amphetamine (ADDERALL) 30 mg tablet Take 1 Tab by mouth daily. Max Daily Amount: 1 Tab. Indications: attention deficit disorder with hyperactivity 2/28/20 4/15/20  Shashank Elam MD   dextroamphetamine-amphetamine (ADDERALL) 30 mg tablet Take 1 Tab by mouth daily. Max Daily Amount: 1 Tab. 1/28/20 4/15/20  Shashank Elam MD     No Known Allergies  Patient Active Problem List    Diagnosis    Attention deficit hyperactivity disorder (ADHD), combined type    [facility] [address]    Mild preeclampsia delivered    S/P  section    Term  delivered by  section, current hospitalization     Patient Active Problem List   Diagnosis Code    Mild preeclampsia delivered O14.04    S/P  section Z98.891    Term  delivered by  section, current hospitalization Z38.01    Morbid obesity ([facility] [address] E66.01    Attention deficit hyperactivity disorder (ADHD), combined type F90.2     Patient Active Problem List    Diagnosis Date Noted    Attention deficit hyperactivity disorder (ADHD), combined type 2018    Morbid obesity ([facility] [address] 2018    Mild preeclampsia delivered 2014    S/P  section 2014    Term  delivered by  section, current hospitalization 2014     Current Outpatient Medications   Medication Sig Dispense Refill    pantoprazole (PROTONIX) 40 mg tablet Take 1 Tab by mouth daily. Indications: heartburn 30 Tab 5    dextroamphetamine-amphetamine (ADDERALL) 30 mg tablet Take 1 Tab by mouth daily. Max Daily Amount: 1 Tab. Indications: attention deficit disorder with hyperactivity 30 Tab 0    [START ON 6/15/2020] dextroamphetamine-amphetamine (ADDERALL) 30 mg tablet Take 1 Tab by mouth daily. Max Daily Amount: 1 Tab. 30 Tab 0    [START ON 5/15/2020] dextroamphetamine-amphetamine (ADDERALL) 30 mg tablet Take 1 Tab by mouth daily. Max Daily Amount: 1 Tab.  30 Tab 0    ondansetron (ZOFRAN ODT) 4 mg disintegrating tablet Take 1 Tab by mouth every eight (8) hours as needed for Nausea or Vomiting. 20 Tab 0    azithromycin (ZITHROMAX) 250 mg tablet Take two tablets today then one tablet daily 6 Tab 0    ALPRAZolam (XANAX) 1 mg tablet TAKE 1 TABLET BY MOUTH DAILY AS NEEDED FOR ANXIETY 10 Tab 0       ROS    Objective:   Vital Signs: (As obtained by patient/caregiver at home)  There were no vitals taken for this visit. [INSTRUCTIONS:  \"[x]\" Indicates a positive item  \"[]\" Indicates a negative item  -- DELETE ALL ITEMS NOT EXAMINED]    Constitutional: [x] Appears well-developed and well-nourished [x] No apparent distress      [] Abnormal -     Mental status: [x] Alert and awake  [x] Oriented to person/place/time [x] Able to follow commands    [] Abnormal -     Eyes:   EOM    [x]  Normal    [] Abnormal -   Sclera  [x]  Normal    [] Abnormal -          Discharge [x]  None visible   [] Abnormal -     HENT: [x] Normocephalic, atraumatic  [] Abnormal -   [x] Mouth/Throat: Mucous membranes are moist    External Ears [x] Normal  [] Abnormal -    Neck: [x] No visualized mass [] Abnormal -     Pulmonary/Chest: [x] Respiratory effort normal   [x] No visualized signs of difficulty breathing or respiratory distress        [] Abnormal -        Neurological:        [x] No Facial Asymmetry (Cranial nerve 7 motor function) (limited exam due to video visit)          [x] No gaze palsy        [] Abnormal -          Skin:        [x] No significant exanthematous lesions or discoloration noted on facial skin         [] Abnormal -            Psychiatric:       [x] Normal Affect [] Abnormal -        [x] No Hallucinations    Other pertinent observable physical exam findings:-              Assessment & Plan:   Diagnoses and all orders for this visit:    1. Generalized abdominal pain  -     pantoprazole (PROTONIX) 40 mg tablet; Take 1 Tab by mouth daily. Indications: heartburn  -cont rx    2.  Attention deficit hyperactivity disorder (ADHD), combined type  -     dextroamphetamine-amphetamine (ADDERALL) 30 mg tablet; Take 1 Tab by mouth daily. Max Daily Amount: 1 Tab. Indications: attention deficit disorder with hyperactivity  -     dextroamphetamine-amphetamine (ADDERALL) 30 mg tablet; Take 1 Tab by mouth daily. Max Daily Amount: 1 Tab.  -     dextroamphetamine-amphetamine (ADDERALL) 30 mg tablet; Take 1 Tab by mouth daily. Max Daily Amount: 1 Tab. -stable                    We discussed the expected course, resolution and complications of the diagnosis(es) in detail. Medication risks, benefits, costs, interactions, and alternatives were discussed as indicated. I advised her to contact the office if her condition worsens, changes or fails to improve as anticipated. She expressed understanding with the diagnosis(es) and plan. Didi Farrar is a 21 y.o. female being evaluated by a video visit encounter for concerns as above. A caregiver was present when appropriate. Due to this being a TeleHealth encounter (During [facility] public health emergency), evaluation of the following organ systems was limited: Vitals/Constitutional/EENT/Resp/CV/GI//MS/Neuro/Skin/Heme-Lymph-Imm. Pursuant to the emergency declaration under the Former[facility], [facility]5 waiver authority and the [employer] and [employer] Act, this Virtual  Visit was conducted, with patient's (and/or legal guardian's) consent, to reduce the patient's risk of exposure to COVID-19 and provide necessary medical care. Services were provided through a video synchronous discussion virtually to substitute for in-person clinic visit. Patient and provider were located at their individual homes.         Ana Franklin MD

## 2020-04-20 RX ORDER — BUPROPION HYDROCHLORIDE 150 MG/1
150 TABLET ORAL
Qty: 30 TAB | Refills: 5 | Status: SHIPPED | OUTPATIENT
Start: 2020-04-20 | End: 2020-06-25

## 2020-06-25 ENCOUNTER — VIRTUAL VISIT (OUTPATIENT)
Dept: FAMILY MEDICINE CLINIC | Age: 25
End: 2020-06-25

## 2020-06-25 ENCOUNTER — TELEPHONE (OUTPATIENT)
Dept: FAMILY MEDICINE CLINIC | Age: 25
End: 2020-06-25

## 2020-06-25 DIAGNOSIS — F41.9 ANXIETY: Primary | ICD-10-CM

## 2020-06-25 DIAGNOSIS — F90.2 ATTENTION DEFICIT HYPERACTIVITY DISORDER (ADHD), COMBINED TYPE: ICD-10-CM

## 2020-06-25 DIAGNOSIS — F41.1 GAD (GENERALIZED ANXIETY DISORDER): ICD-10-CM

## 2020-06-25 RX ORDER — ALPRAZOLAM 1 MG/1
TABLET ORAL
Qty: 30 TAB | Refills: 1 | Status: SHIPPED | OUTPATIENT
Start: 2020-06-25 | End: 2020-09-03 | Stop reason: SDUPTHER

## 2020-06-25 RX ORDER — ESCITALOPRAM OXALATE 10 MG/1
10 TABLET ORAL DAILY
Qty: 30 TAB | Refills: 3 | Status: SHIPPED | OUTPATIENT
Start: 2020-06-25 | End: 2020-07-19 | Stop reason: SDUPTHER

## 2020-06-25 RX ORDER — DEXTROAMPHETAMINE SACCHARATE, AMPHETAMINE ASPARTATE, DEXTROAMPHETAMINE SULFATE AND AMPHETAMINE SULFATE 7.5; 7.5; 7.5; 7.5 MG/1; MG/1; MG/1; MG/1
30 TABLET ORAL DAILY
Qty: 90 TAB | Refills: 0 | Status: SHIPPED | OUTPATIENT
Start: 2020-06-25 | End: 2021-07-14

## 2020-06-25 NOTE — PROGRESS NOTES
Here for VV    Refill add rx    Would like to dc wellbutrin    Takes xanax occ    Consent: Daniel Ribera, who was seen by synchronous (real-time) audio-video technology, and/or her healthcare decision maker, is aware that this patient-initiated, Telehealth encounter on 6/25/2020 is a billable service, with coverage as determined by her insurance carrier. She is aware that she may receive a bill and has provided verbal consent to proceed: Yes. 712  Subjective:   Daniel Ribera is a 21 y.o. female who was seen for No chief complaint on file. Prior to Admission medications    Medication Sig Start Date End Date Taking? Authorizing Provider   dextroamphetamine-amphetamine (ADDERALL) 30 mg tablet Take 1 Tab by mouth daily. Max Daily Amount: 1 Tab. 6/25/20  Yes Meri Vázquez MD   escitalopram oxalate (LEXAPRO) 10 mg tablet Take 1 Tab by mouth daily. 6/25/20  Yes Meri Vázquez MD   ALPRAZolam Amina Pile) 1 mg tablet TAKE 1 TABLET BY MOUTH DAILY AS NEEDED FOR ANXIETY 6/25/20  Yes Meri Vázquez MD   buPROPion XL (WELLBUTRIN XL) 150 mg tablet Take 1 Tab by mouth every morning. 4/20/20 6/25/20  Meri Vázquez MD   pantoprazole (PROTONIX) 40 mg tablet Take 1 Tab by mouth daily. Indications: heartburn 4/15/20   Meri Vzáquez MD   dextroamphetamine-amphetamine (ADDERALL) 30 mg tablet Take 1 Tab by mouth daily. Max Daily Amount: 1 Tab. Indications: attention deficit disorder with hyperactivity 4/15/20   Meri Vázquez MD   dextroamphetamine-amphetamine (ADDERALL) 30 mg tablet Take 1 Tab by mouth daily. Max Daily Amount: 1 Tab. 5/15/20   Meri Vázquez MD   dextroamphetamine-amphetamine (ADDERALL) 30 mg tablet Take 1 Tab by mouth daily.  Max Daily Amount: 1 Tab. 6/15/20 6/25/20  Meri Vázquez MD   ondansetron (ZOFRAN ODT) 4 mg disintegrating tablet Take 1 Tab by mouth every eight (8) hours as needed for Nausea or Vomiting. 3/23/20   Ritika Sharma NP   azithromycin (ZITHROMAX) 250 mg tablet Take two tablets today then one tablet daily 20   Ac Plasencia MD   ALPRAZojeremy Krueger) 1 mg tablet TAKE 1 TABLET BY MOUTH DAILY AS NEEDED FOR ANXIETY 20  Ac Plasencia MD     No Known Allergies  Patient Active Problem List    Diagnosis    Attention deficit hyperactivity disorder (ADHD), combined type    Morbid obesity ([facility]    Mild preeclampsia delivered    S/P  section    Term  delivered by  section, current hospitalization     Patient Active Problem List    Diagnosis Date Noted    Attention deficit hyperactivity disorder (ADHD), combined type 2018    Morbid obesity ([facility] 2018    Mild preeclampsia delivered 2014    S/P  section 2014    Term  delivered by  section, current hospitalization 2014       ROS    Objective:   Vital Signs: (As obtained by patient/caregiver at home)  There were no vitals taken for this visit.      [INSTRUCTIONS:  \"[x]\" Indicates a positive item  \"[]\" Indicates a negative item  -- DELETE ALL ITEMS NOT EXAMINED]    Constitutional: [x] Appears well-developed and well-nourished [x] No apparent distress      [] Abnormal -     Mental status: [x] Alert and awake  [x] Oriented to person/place/time [x] Able to follow commands    [] Abnormal -     Eyes:   EOM    [x]  Normal    [] Abnormal -   Sclera  [x]  Normal    [] Abnormal -          Discharge [x]  None visible   [] Abnormal -     HENT: [x] Normocephalic, atraumatic  [] Abnormal -   [x] Mouth/Throat: Mucous membranes are moist    External Ears [x] Normal  [] Abnormal -    Neck: [x] No visualized mass [] Abnormal -     Pulmonary/Chest: [x] Respiratory effort normal   [x] No visualized signs of difficulty breathing or respiratory distress        [] Abnormal -        Neurological:        [x] No Facial Asymmetry (Cranial nerve 7 motor function) (limited exam due to video visit)          [x] No gaze palsy        [] Abnormal -          Skin:        [x] No significant exanthematous lesions or discoloration noted on facial skin         [] Abnormal -            Psychiatric:       [x] Normal Affect [] Abnormal -        [x] No Hallucinations    Other pertinent observable physical exam findings:-              Assessment & Plan:   Diagnoses and all orders for this visit:    1. Anxiety  -     escitalopram oxalate (LEXAPRO) 10 mg tablet; Take 1 Tab by mouth daily.  -add new rx and dc wellbutrin    2. Attention deficit hyperactivity disorder (ADHD), combined type  -     dextroamphetamine-amphetamine (ADDERALL) 30 mg tablet; Take 1 Tab by mouth daily. Max Daily Amount: 1 Tab.  -90 day supply    3. ROSALIO (generalized anxiety disorder)  -     ALPRAZolam (XANAX) 1 mg tablet; TAKE 1 TABLET BY MOUTH DAILY AS NEEDED FOR ANXIETY          Follow-up and Dispositions    · Return in about 3 months (around 9/25/2020). We discussed the expected course, resolution and complications of the diagnosis(es) in detail. Medication risks, benefits, costs, interactions, and alternatives were discussed as indicated. I advised her to contact the office if her condition worsens, changes or fails to improve as anticipated. She expressed understanding with the diagnosis(es) and plan. Yobani Zuluaga is a 21 y.o. female being evaluated by a video visit encounter for concerns as above. A caregiver was present when appropriate. Due to this being a TeleHealth encounter (During [facility]- public health emergency), evaluation of the following organ systems was limited: Vitals/Constitutional/EENT/Resp/CV/GI//MS/Neuro/Skin/Heme-Lymph-Imm.   Pursuant to the emergency declaration under the [facility] waiver authority and the [employer] and [employer] Act, this Virtual  Visit was conducted, with patient's (and/or legal guardian's) consent, to reduce the patient's risk of exposure to COVID-19 and provide necessary medical care. Services were provided through a video synchronous discussion virtually to substitute for in-person clinic visit. Patient and provider were located at their individual homes.         Tish Boyer MD

## 2020-07-17 DIAGNOSIS — F41.9 ANXIETY: ICD-10-CM

## 2020-07-19 RX ORDER — ESCITALOPRAM OXALATE 10 MG/1
10 TABLET ORAL DAILY
Qty: 30 TAB | Refills: 3 | Status: SHIPPED | OUTPATIENT
Start: 2020-07-19 | End: 2020-10-22

## 2020-07-29 ENCOUNTER — OFFICE VISIT (OUTPATIENT)
Dept: OBGYN CLINIC | Age: 25
End: 2020-07-29

## 2020-07-29 VITALS
DIASTOLIC BLOOD PRESSURE: 83 MMHG | SYSTOLIC BLOOD PRESSURE: 133 MMHG | WEIGHT: 188 LBS | HEIGHT: 64 IN | BODY MASS INDEX: 32.1 KG/M2

## 2020-07-29 DIAGNOSIS — Z01.419 WELL FEMALE EXAM WITH ROUTINE GYNECOLOGICAL EXAM: Primary | ICD-10-CM

## 2020-07-29 DIAGNOSIS — L67.8 ABNORMAL FACIAL HAIR: ICD-10-CM

## 2020-07-29 RX ORDER — SPIRONOLACTONE 50 MG/1
50 TABLET, FILM COATED ORAL 2 TIMES DAILY
Qty: 180 TAB | Refills: 4 | Status: SHIPPED | OUTPATIENT
Start: 2020-07-29 | End: 2020-10-22

## 2020-07-29 NOTE — PROGRESS NOTES
Annual exam ages 20-44    Norberto Jeter is a ,  21 y.o. female   No LMP recorded. (Menstrual status: IUD). She presents for her annual checkup. She is having significant weight gain and facial hair growth. With regard to the Gardasil vaccine, she has not received it yet. Menstrual status:    Her periods are absent due to IUD. Contraception:    The current method of family planning is Hormonal IUS. Sexual history:    She  reports being sexually active and has had partner(s) who are Male. She reports using the following method of birth control/protection: I.U.D..    Medical conditions:    Since her last annual GYN exam about three years ago, she has not the following changes in her health history: none. Surgical history confirmed with patient. has a past surgical history that includes hx  section (13) and hx dilation and curettage (10/2016). Pap and Mammogram History:    Her most recent Pap smear was normal, obtained 3 year(s) ago. The patient has never had a mammogram.    Breast Cancer History/Substance Abuse: negative    Past Medical History:   Diagnosis Date    Excessive or frequent menstruation 12    Hx of abnormal Pap smear 2013    ASCUS +HPV    IUD (intrauterine device) in place 10/24/14 mirena resulted in perforation--- Mirena inserted 2017    Mirena placed    Need for prophylactic vaccination and inoculation against other viral diseases(V04.89)     Gardasil injections completed all 3 previously.  Ruptured ovarian cyst      Past Surgical History:   Procedure Laterality Date    HX  SECTION  14    HX DILATION AND CURETTAGE  10/2016       Current Outpatient Medications   Medication Sig Dispense Refill    escitalopram oxalate (LEXAPRO) 10 mg tablet Take 1 Tab by mouth daily. 30 Tab 3    dextroamphetamine-amphetamine (ADDERALL) 30 mg tablet Take 1 Tab by mouth daily. Max Daily Amount: 1 Tab.  90 Tab 0    ALPRAZolam (XANAX) 1 mg tablet TAKE 1 TABLET BY MOUTH DAILY AS NEEDED FOR ANXIETY 30 Tab 1    pantoprazole (PROTONIX) 40 mg tablet Take 1 Tab by mouth daily. Indications: heartburn 30 Tab 5    dextroamphetamine-amphetamine (ADDERALL) 30 mg tablet Take 1 Tab by mouth daily. Max Daily Amount: 1 Tab. Indications: attention deficit disorder with hyperactivity 30 Tab 0    dextroamphetamine-amphetamine (ADDERALL) 30 mg tablet Take 1 Tab by mouth daily. Max Daily Amount: 1 Tab. 30 Tab 0    ondansetron (ZOFRAN ODT) 4 mg disintegrating tablet Take 1 Tab by mouth every eight (8) hours as needed for Nausea or Vomiting. 20 Tab 0    azithromycin (ZITHROMAX) 250 mg tablet Take two tablets today then one tablet daily 6 Tab 0     Allergies: Patient has no known allergies. Tobacco History:  reports that she has never smoked. She has never used smokeless tobacco.  Alcohol Abuse:  reports no history of alcohol use. Drug Abuse:  reports no history of drug use.     Family Medical/Cancer History:   Family History   Problem Relation Age of Onset    Hypertension Father     Hypertension Mother         Review of Systems - History obtained from the patient  Constitutional: negative for weight loss, fever, night sweats  HEENT: negative for hearing loss, earache, congestion, snoring, sorethroat  CV: negative for chest pain, palpitations, edema  Resp: negative for cough, shortness of breath, wheezing  GI: negative for change in bowel habits, abdominal pain, black or bloody stools  : negative for frequency, dysuria, hematuria, vaginal discharge  MSK: negative for back pain, joint pain, muscle pain  Breast: negative for breast lumps, nipple discharge, galactorrhea  Skin :negative for itching, rash, hives  Neuro: negative for dizziness, headache, confusion, weakness  Psych: negative for anxiety, depression, change in mood  Heme/lymph: negative for bleeding, bruising, pallor    Physical Exam    There were no vitals taken for this visit.     Constitutional  · Appearance: well-nourished, well developed, alert, in no acute distress    HENT  · Head and Face: appears normal    Neck  · Inspection/Palpation: normal appearance, no masses or tenderness  · Lymph Nodes: no lymphadenopathy present  · Thyroid: gland size normal, nontender, no nodules or masses present on palpation    Chest  · Respiratory Effort: breathing unlabored    Breasts  · Inspection of Breasts: breasts symmetrical, no skin changes, no discharge present, nipple appearance normal, no skin retraction present  · Palpation of Breasts and Axillae: no masses present on palpation, no breast tenderness  · Axillary Lymph Nodes: no lymphadenopathy present    Gastrointestinal  · Abdominal Examination: abdomen non-tender to palpation, normal bowel sounds, no masses present  · Liver and spleen: no hepatomegaly present, spleen not palpable  · Hernias: no hernias identified    Genitourinary  · External Genitalia: normal appearance for age, no discharge present, no tenderness present, no inflammatory lesions present, no masses present, no atrophy present  · Vagina: normal vaginal vault without central or paravaginal defects, no discharge present, no inflammatory lesions present, no masses present  · Bladder: non-tender to palpation  · Urethra: appears normal  · Cervix: normal   · Uterus: normal size, shape and consistency  · Adnexa: no adnexal tenderness present, no adnexal masses present  · Perineum: perineum within normal limits, no evidence of trauma, no rashes or skin lesions present  · Anus: anus within normal limits, no hemorrhoids present  · Inguinal Lymph Nodes: no lymphadenopathy present    Skin  · General Inspection: no rash, no lesions identified    Neurologic/Psychiatric  · Mental Status:  · Orientation: grossly oriented to person, place and time  · Mood and Affect: mood normal, affect appropriate    Assessment:  Routine gynecologic examination  Her current medical status is satisfactory with no evidence of significant gynecologic issues. Weight gain/hirsuitism- will check pcos labs, will start spironolactone, if PCOS labs + then will start metformin, also [facility].     Plan:  Counseled re: diet, exercise, healthy lifestyle  Return for yearly wellness visits

## 2020-08-01 LAB
17OHP SERPL-MCNC: 22 NG/DL
ALBUMIN SERPL-MCNC: 4.7 G/DL (ref 3.9–5)
ALBUMIN/GLOB SERPL: 2.1 {RATIO} (ref 1.2–2.2)
ALP SERPL-CCNC: 76 IU/L (ref 39–117)
ALT SERPL-CCNC: 43 IU/L (ref 0–32)
AST SERPL-CCNC: 29 IU/L (ref 0–40)
BILIRUB SERPL-MCNC: 0.4 MG/DL (ref 0–1.2)
BUN SERPL-MCNC: 12 MG/DL (ref 6–20)
BUN/CREAT SERPL: 15 (ref 9–23)
CALCIUM SERPL-MCNC: 9.4 MG/DL (ref 8.7–10.2)
CHLORIDE SERPL-SCNC: 102 MMOL/L (ref 96–106)
CO2 SERPL-SCNC: 23 MMOL/L (ref 20–29)
CREAT SERPL-MCNC: 0.82 MG/DL (ref 0.57–1)
GLOBULIN SER CALC-MCNC: 2.2 G/DL (ref 1.5–4.5)
GLUCOSE SERPL-MCNC: 90 MG/DL (ref 65–99)
POTASSIUM SERPL-SCNC: 4.1 MMOL/L (ref 3.5–5.2)
PROLACTIN SERPL-MCNC: 14.3 NG/ML (ref 4.8–23.3)
PROT SERPL-MCNC: 6.9 G/DL (ref 6–8.5)
SODIUM SERPL-SCNC: 138 MMOL/L (ref 134–144)
T4 FREE SERPL-MCNC: 1.45 NG/DL (ref 0.82–1.77)
TESTOST FREE SERPL-MCNC: 2.4 PG/ML (ref 0–4.2)
TESTOST SERPL-MCNC: 32 NG/DL (ref 10–55)
TSH SERPL DL<=0.005 MIU/L-ACNC: 1.4 UIU/ML (ref 0.45–4.5)

## 2020-08-04 LAB
C TRACH RRNA CVX QL NAA+PROBE: NEGATIVE
CYTOLOGIST CVX/VAG CYTO: NORMAL
CYTOLOGY CVX/VAG DOC CYTO: NORMAL
CYTOLOGY CVX/VAG DOC THIN PREP: NORMAL
CYTOLOGY HISTORY:: NORMAL
DX ICD CODE: NORMAL
LABCORP, 190119: NORMAL
Lab: NORMAL
N GONORRHOEA RRNA CVX QL NAA+PROBE: NEGATIVE
OTHER STN SPEC: NORMAL
STAT OF ADQ CVX/VAG CYTO-IMP: NORMAL
T VAGINALIS RRNA SPEC QL NAA+PROBE: NEGATIVE

## 2020-09-03 DIAGNOSIS — F41.1 GAD (GENERALIZED ANXIETY DISORDER): ICD-10-CM

## 2020-09-03 RX ORDER — ONDANSETRON 4 MG/1
4 TABLET, ORALLY DISINTEGRATING ORAL
Qty: 20 TAB | Refills: 0 | Status: SHIPPED | OUTPATIENT
Start: 2020-09-03 | End: 2020-10-06

## 2020-09-03 RX ORDER — ALPRAZOLAM 1 MG/1
TABLET ORAL
Qty: 30 TAB | Refills: 1 | Status: SHIPPED | OUTPATIENT
Start: 2020-09-03 | End: 2020-12-28 | Stop reason: SDUPTHER

## 2020-10-06 ENCOUNTER — VIRTUAL VISIT (OUTPATIENT)
Dept: FAMILY MEDICINE CLINIC | Age: 25
End: 2020-10-06
Payer: COMMERCIAL

## 2020-10-06 DIAGNOSIS — E66.01 MORBID OBESITY (HCC): ICD-10-CM

## 2020-10-06 DIAGNOSIS — G47.00 INSOMNIA, UNSPECIFIED TYPE: ICD-10-CM

## 2020-10-06 DIAGNOSIS — F90.2 ATTENTION DEFICIT HYPERACTIVITY DISORDER (ADHD), COMBINED TYPE: Primary | ICD-10-CM

## 2020-10-06 PROCEDURE — 99213 OFFICE O/P EST LOW 20 MIN: CPT | Performed by: FAMILY MEDICINE

## 2020-10-06 RX ORDER — DEXTROAMPHETAMINE SACCHARATE, AMPHETAMINE ASPARTATE, DEXTROAMPHETAMINE SULFATE AND AMPHETAMINE SULFATE 7.5; 7.5; 7.5; 7.5 MG/1; MG/1; MG/1; MG/1
30 TABLET ORAL DAILY
Qty: 30 TAB | Refills: 0 | Status: SHIPPED | OUTPATIENT
Start: 2020-12-06 | End: 2021-01-25 | Stop reason: SDUPTHER

## 2020-10-06 RX ORDER — ONDANSETRON 4 MG/1
4 TABLET, ORALLY DISINTEGRATING ORAL
Qty: 20 TAB | Refills: 0 | Status: SHIPPED | OUTPATIENT
Start: 2020-10-06 | End: 2021-04-04 | Stop reason: SDUPTHER

## 2020-10-06 RX ORDER — TRAZODONE HYDROCHLORIDE 50 MG/1
50 TABLET ORAL
Qty: 30 TAB | Refills: 1 | Status: SHIPPED | OUTPATIENT
Start: 2020-10-06 | End: 2021-07-14

## 2020-10-06 RX ORDER — DEXTROAMPHETAMINE SACCHARATE, AMPHETAMINE ASPARTATE, DEXTROAMPHETAMINE SULFATE AND AMPHETAMINE SULFATE 7.5; 7.5; 7.5; 7.5 MG/1; MG/1; MG/1; MG/1
30 TABLET ORAL DAILY
Qty: 30 TAB | Refills: 0 | Status: SHIPPED | OUTPATIENT
Start: 2020-11-06 | End: 2021-01-25 | Stop reason: SDUPTHER

## 2020-10-06 RX ORDER — DEXTROAMPHETAMINE SACCHARATE, AMPHETAMINE ASPARTATE, DEXTROAMPHETAMINE SULFATE AND AMPHETAMINE SULFATE 7.5; 7.5; 7.5; 7.5 MG/1; MG/1; MG/1; MG/1
30 TABLET ORAL DAILY
Qty: 30 TAB | Refills: 0 | Status: SHIPPED | OUTPATIENT
Start: 2020-10-06 | End: 2021-01-25 | Stop reason: SDUPTHER

## 2020-10-06 NOTE — PROGRESS NOTES
Patient is here today for complete refill of her ADD medications. 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 She has tried trazodone in the past with success. Consent: Naya Patel, who was seen by synchronous (real-time) audio-video technology, and/or her healthcare decision maker, is aware that this patient-initiated, Telehealth encounter on 10/6/2020 is a billable service, with coverage as determined by her insurance carrier. She is aware that she may receive a bill and has provided verbal consent to proceed: YES-Consent obtained within past 12 months        712  Subjective:   Naya Patel is a 31 y.o. female who was seen for No chief complaint on file. Prior to Admission medications    Medication Sig Start Date End Date Taking? Authorizing Provider   traZODone (DESYREL) 50 mg tablet Take 1 Tab by mouth nightly. 10/6/20  Yes Damaso Melgoza MD   ondansetron (ZOFRAN ODT) 4 mg disintegrating tablet Take 1 Tab by mouth every eight (8) hours as needed for Nausea or Vomiting. 10/6/20  Yes Damaso Melgoza MD   dextroamphetamine-amphetamine (ADDERALL) 30 mg tablet Take 1 Tab by mouth daily. Max Daily Amount: 1 Tab. Indications: attention deficit disorder with hyperactivity 10/6/20  Yes Damaso Melgoza MD   dextroamphetamine-amphetamine (ADDERALL) 30 mg tablet Take 1 Tab by mouth daily. Max Daily Amount: 1 Tab. 12/6/20  Yes Damaso Melgoza MD   dextroamphetamine-amphetamine (ADDERALL) 30 mg tablet Take 1 Tab by mouth daily. Max Daily Amount: 1 Tab. 11/6/20  Yes Damaso Melgoza MD   ALPRAZolam [employer] [employer]) 1 mg tablet TAKE 1 TABLET BY MOUTH DAILY AS NEEDED FOR ANXIETY 9/3/20   Damaso Melgoza MD   ondansetron (ZOFRAN ODT) 4 mg disintegrating tablet Take 1 Tab by mouth every eight (8) hours as needed for Nausea or Vomiting.  9/3/20 10/6/20  Iván Johnston NP   spironolactone (ALDACTONE) 50 mg tablet Take 1 Tab by mouth two (2) times a day. 7/29/20   Levy Ashford MD   escitalopram oxalate (LEXAPRO) 10 mg tablet Take 1 Tab by mouth daily. 7/19/20   Fátima Allen MD   dextroamphetamine-amphetamine (ADDERALL) 30 mg tablet Take 1 Tab by mouth daily. Max Daily Amount: 1 Tab. 6/25/20   Fátima Allen MD   pantoprazole (PROTONIX) 40 mg tablet Take 1 Tab by mouth daily. Indications: heartburn 4/15/20   Fátima Allen MD   dextroamphetamine-amphetamine (ADDERALL) 30 mg tablet Take 1 Tab by mouth daily. Max Daily Amount: 1 Tab. Indications: attention deficit disorder with hyperactivity 4/15/20 10/6/20  Fátima Allen MD   dextroamphetamine-amphetamine (ADDERALL) 30 mg tablet Take 1 Tab by mouth daily. Max Daily Amount: 1 Tab. 5/15/20 10/6/20  Fátima Allen MD   [facility]) 250 mg tablet Take two tablets today then one tablet daily 2/13/20   Fátima Allen MD     No Known Allergies    ROS    Objective:   Vital Signs: (As obtained by patient/caregiver at home)  There were no vitals taken for this visit.      [INSTRUCTIONS:  \"[x]\" Indicates a positive item  \"[]\" Indicates a negative item  -- DELETE ALL ITEMS NOT EXAMINED]    Constitutional: [x] Appears well-developed and well-nourished [x] No apparent distress      [] Abnormal -     Mental status: [x] Alert and awake  [x] Oriented to person/place/time [x] Able to follow commands    [] Abnormal -     Eyes:   EOM    [x]  Normal    [] Abnormal -   Sclera  [x]  Normal    [] Abnormal -          Discharge [x]  None visible   [] Abnormal -     HENT: [x] Normocephalic, atraumatic  [] Abnormal -   [x] Mouth/Throat: Mucous membranes are moist    External Ears [x] Normal  [] Abnormal -    Neck: [x] No visualized mass [] Abnormal -     Pulmonary/Chest: [x] Respiratory effort normal   [x] No visualized signs of difficulty breathing or respiratory distress        [] Abnormal -        Neurological:        [x] No Facial Asymmetry (Cranial nerve 7 motor function) (limited exam due to video visit) [x] No gaze palsy        [] Abnormal -          Skin:        [x] No significant exanthematous lesions or discoloration noted on facial skin         [] Abnormal -            Psychiatric:       [x] Normal Affect [] Abnormal -        [x] No Hallucinations    Other pertinent observable physical exam findings:-              Assessment & Plan:   Diagnoses and all orders for this visit:    1. Attention deficit hyperactivity disorder (ADHD), combined type  -     dextroamphetamine-amphetamine (ADDERALL) 30 mg tablet; Take 1 Tab by mouth daily. Max Daily Amount: 1 Tab. Indications: attention deficit disorder with hyperactivity  -     dextroamphetamine-amphetamine (ADDERALL) 30 mg tablet; Take 1 Tab by mouth daily. Max Daily Amount: 1 Tab.  -     dextroamphetamine-amphetamine (ADDERALL) 30 mg tablet; Take 1 Tab by mouth daily. Max Daily Amount: 1 Tab. -Doing well  2. Morbid obesity ([address]    3. Insomnia, unspecified type  -Add medication below, use only as needed and do not combine with Xanax    Other orders  -     traZODone (DESYREL) 50 mg tablet; Take 1 Tab by mouth nightly. -     ondansetron (ZOFRAN ODT) 4 mg disintegrating tablet; Take 1 Tab by mouth every eight (8) hours as needed for Nausea or Vomiting. Follow-up and Dispositions    · Return in about 3 months (around 1/6/2021). We discussed the expected course, resolution and complications of the diagnosis(es) in detail. Medication risks, benefits, costs, interactions, and alternatives were discussed as indicated. I advised her to contact the office if her condition worsens, changes or fails to improve as anticipated. She expressed understanding with the diagnosis(es) and plan. Verito Stapleton is a 31 y.o. female being evaluated by a video visit encounter for concerns as above. A caregiver was present when appropriate.  Due to this being a TeleHealth encounter (During [facility]- public [facility] emergency), evaluation of the following organ systems was limited: Vitals/Constitutional/EENT/Resp/CV/GI//MS/Neuro/Skin/Heme-Lymph-Imm. Pursuant to the emergency declaration under the [facility], [facility] waiver authority and the [employer] Act, this Virtual  Visit was conducted, with patient's (and/or legal guardian's) consent, to reduce the patient's risk of exposure to COVID-19 and provide necessary medical care. Services were provided through a video synchronous discussion virtually to substitute for in-person clinic visit. Patient and provider were located at their individual homes.         Arnold Valadez MD

## 2020-10-07 ENCOUNTER — PATIENT MESSAGE (OUTPATIENT)
Dept: FAMILY MEDICINE CLINIC | Age: 25
End: 2020-10-07

## 2020-10-08 ENCOUNTER — OFFICE VISIT (OUTPATIENT)
Dept: FAMILY MEDICINE CLINIC | Age: 25
End: 2020-10-08
Payer: COMMERCIAL

## 2020-10-08 ENCOUNTER — TELEPHONE (OUTPATIENT)
Dept: FAMILY MEDICINE CLINIC | Age: 25
End: 2020-10-08

## 2020-10-08 DIAGNOSIS — Z79.899 MEDICATION MANAGEMENT: Primary | ICD-10-CM

## 2020-10-08 LAB
BARBITURATES UR POC: NEGATIVE
BENZODIAZEPINES UR POC: NEGATIVE
COCAINE QL URINE POC: NEGATIVE
LOT EXP DATE POC: NORMAL
LOT NUMBER POC: NORMAL
MARIJUANA (THC) QL URINE POC: NEGATIVE
MDMA/ECSTASY UR POC: NEGATIVE
METHADONE QL URINE POC: NEGATIVE
METHAMPHETAMINE QL URINE POC: NEGATIVE
NTI OTHER MICRO TRANSPORT 977598: NEGATIVE
OPIATES QL URINE POC: NEGATIVE
OXYCODONE UR POC: NEGATIVE
VALID INTERNAL CONTROL?: YES

## 2020-10-08 PROCEDURE — 80305 DRUG TEST PRSMV DIR OPT OBS: CPT | Performed by: NURSE PRACTITIONER

## 2020-10-08 NOTE — TELEPHONE ENCOUNTER
MATHEW on VM- called [facility] [employer] to do PA for Trazodone - states she has another primary insurance- need to call to inform if she do or don't have another ins.

## 2020-10-08 NOTE — TELEPHONE ENCOUNTER
PT states spoke with ins & it can take up to 3 days to cancel old ins- advised to call back when she gets call from ins. The forms that need to be faxed are on Maame's desk corner.

## 2020-10-09 ENCOUNTER — TELEPHONE (OUTPATIENT)
Dept: FAMILY MEDICINE CLINIC | Age: 25
End: 2020-10-09

## 2020-10-09 NOTE — TELEPHONE ENCOUNTER
----- Message from Jenelle Apgar sent at 10/9/2020  7:12 AM EDT -----  Regarding: Dr. Nicolas Parada  Medication Refill    Caller (if not patient):      Relationship of caller (if not patient):      Best contact number(s):[phone]      Name of medication and dosage if known: Adderall and Trazadone      Is patient out of this medication (yes/no):yes      Pharmacy name:[facility] on  [facility] listed in chart? (yes/no):yes  Pharmacy phone number:      Details to clarify the request:refill of Adderall and Trazadone      Jenelle Apgar

## 2020-10-09 NOTE — TELEPHONE ENCOUNTER
----- Message from Harshal Rolly sent at 10/9/2020  2:29 PM EDT -----  Regarding: Office Staff/Telephone  General Message/Vendor Calls    Caller's first and last name:Cynthia Steven      Reason for call:status of Adderall and Trazadone      Callback required yes/no and why:yes      Best contact number(s):[phone]      Details to clarify the request:status of Trazadone and Adderall      Harshal Lofton

## 2020-10-12 NOTE — TELEPHONE ENCOUNTER
I just want to make sure I'm reading this right, you did these last week during VV, right?     [facility]

## 2020-10-21 NOTE — PROGRESS NOTES
[facility]  OFFICE PROCEDURE PROGRESS NOTE        Chart reviewed for the following:   Rigo SIMONS, have reviewed the History, Physical and updated the Allergic reactions for Manan Urrutia 1160 performed immediately prior to start of procedure:   Rigo SIMONS, have performed the following reviews on Reese Hidalgo prior to the start of the procedure:            * Patient was identified by name and date of birth   * Agreement on procedure being performed was verified  * Risks and Benefits explained to the patient  * Procedure site verified and marked as necessary  * Patient was positioned for comfort  * Consent was signed and verified     Time: 940      Date of procedure: 10/21/2020    Procedure performed by:  Alisia Kramer MD    Provider assisted by: Viktor Chaudhari     Patient assisted by: self    How tolerated by patient: tolerated the procedure well with no complications    Post Procedural Pain Scale: 0 - No Hurt    Comments: none        IUD REMOVAL  Indications for Removal:  Reese Hidalgo is a ,  31 y.o. female [facility] [place] whose No LMP recorded. (Menstrual status: IUD). Oscar Myers who presents today for IUD removal. Her current IUD was placed 3 years ago. She has had problems with the IUD. She requests removal of the IUD because of complaints with the IUD. The IUD removal procedure was discussed with the patient and she had no further questions. Procedure: The patient was placed in a dorsal lithotomy position and appropriately draped. On bimanual exam the uterus was anterior and normal in size with no tenderness present. A speculum exam was performed and the cervix was visualized. The cervix was prepped with zephiran solution. The IUD string was visualized. Using ring forceps , the string was grasped and the IUD removed intact. The IUD was shown to the patient.

## 2020-10-22 ENCOUNTER — OFFICE VISIT (OUTPATIENT)
Dept: OBGYN CLINIC | Age: 25
End: 2020-10-22
Payer: COMMERCIAL

## 2020-10-22 ENCOUNTER — PATIENT MESSAGE (OUTPATIENT)
Dept: OBGYN CLINIC | Age: 25
End: 2020-10-22

## 2020-10-22 DIAGNOSIS — Z30.432 ENCOUNTER FOR IUD REMOVAL: Primary | ICD-10-CM

## 2020-10-22 PROCEDURE — 58301 REMOVE INTRAUTERINE DEVICE: CPT | Performed by: OBSTETRICS & GYNECOLOGY

## 2020-10-22 RX ORDER — NORETHINDRONE ACETATE AND ETHINYL ESTRADIOL, ETHINYL ESTRADIOL AND FERROUS FUMARATE 1MG-10(24)
1 KIT ORAL DAILY
Qty: 3 PACKAGE | Refills: 4 | Status: SHIPPED | OUTPATIENT
Start: 2020-10-22 | End: 2020-10-23 | Stop reason: SDUPTHER

## 2020-10-23 RX ORDER — NORETHINDRONE ACETATE AND ETHINYL ESTRADIOL, ETHINYL ESTRADIOL AND FERROUS FUMARATE 1MG-10(24)
1 KIT ORAL DAILY
Qty: 3 PACKAGE | Refills: 4 | Status: SHIPPED | OUTPATIENT
Start: 2020-10-23 | End: 2020-10-27

## 2020-10-26 ENCOUNTER — PATIENT MESSAGE (OUTPATIENT)
Dept: OBGYN CLINIC | Age: 25
End: 2020-10-26

## 2020-10-27 RX ORDER — NORETHINDRONE ACETATE AND ETHINYL ESTRADIOL 1MG-20(21)
1 KIT ORAL DAILY
Qty: 3 PACKAGE | Refills: 4 | Status: SHIPPED | OUTPATIENT
Start: 2020-10-27 | End: 2021-07-14

## 2020-11-12 ENCOUNTER — TELEPHONE (OUTPATIENT)
Dept: FAMILY MEDICINE CLINIC | Age: 25
End: 2020-11-12

## 2020-11-12 NOTE — TELEPHONE ENCOUNTER
PA for Adderall 30 mg approved from 10/20/2020 - 10/21/2021, copy faxed to pharmacy 7 Copy placed in scan folder to be scanned to chart.

## 2020-12-23 RX ORDER — AZITHROMYCIN 250 MG/1
TABLET, FILM COATED ORAL
Qty: 6 TAB | Refills: 0 | Status: SHIPPED | OUTPATIENT
Start: 2020-12-23 | End: 2021-07-14

## 2020-12-23 RX ORDER — FLUCONAZOLE 150 MG/1
150 TABLET ORAL DAILY
Qty: 1 TAB | Refills: 5 | Status: SHIPPED | OUTPATIENT
Start: 2020-12-23 | End: 2020-12-24

## 2020-12-28 DIAGNOSIS — F41.1 GAD (GENERALIZED ANXIETY DISORDER): ICD-10-CM

## 2020-12-28 RX ORDER — ALPRAZOLAM 1 MG/1
TABLET ORAL
Qty: 30 TAB | Refills: 1 | Status: SHIPPED | OUTPATIENT
Start: 2020-12-28 | End: 2021-04-04 | Stop reason: SDUPTHER

## 2021-01-25 ENCOUNTER — VIRTUAL VISIT (OUTPATIENT)
Dept: FAMILY MEDICINE CLINIC | Age: 26
End: 2021-01-25
Payer: COMMERCIAL

## 2021-01-25 DIAGNOSIS — F90.2 ATTENTION DEFICIT HYPERACTIVITY DISORDER (ADHD), COMBINED TYPE: ICD-10-CM

## 2021-01-25 PROCEDURE — 99213 OFFICE O/P EST LOW 20 MIN: CPT | Performed by: FAMILY MEDICINE

## 2021-01-25 RX ORDER — DEXTROAMPHETAMINE SACCHARATE, AMPHETAMINE ASPARTATE, DEXTROAMPHETAMINE SULFATE AND AMPHETAMINE SULFATE 7.5; 7.5; 7.5; 7.5 MG/1; MG/1; MG/1; MG/1
30 TABLET ORAL DAILY
Qty: 30 TAB | Refills: 0 | Status: SHIPPED | OUTPATIENT
Start: 2021-01-25 | End: 2022-01-31 | Stop reason: SDUPTHER

## 2021-01-25 RX ORDER — DEXTROAMPHETAMINE SACCHARATE, AMPHETAMINE ASPARTATE, DEXTROAMPHETAMINE SULFATE AND AMPHETAMINE SULFATE 7.5; 7.5; 7.5; 7.5 MG/1; MG/1; MG/1; MG/1
30 TABLET ORAL DAILY
Qty: 30 TAB | Refills: 0 | Status: SHIPPED | OUTPATIENT
Start: 2021-02-25 | End: 2022-01-31 | Stop reason: SDUPTHER

## 2021-01-25 RX ORDER — DEXTROAMPHETAMINE SACCHARATE, AMPHETAMINE ASPARTATE, DEXTROAMPHETAMINE SULFATE AND AMPHETAMINE SULFATE 7.5; 7.5; 7.5; 7.5 MG/1; MG/1; MG/1; MG/1
30 TABLET ORAL DAILY
Qty: 30 TAB | Refills: 0 | Status: SHIPPED | OUTPATIENT
Start: 2021-03-25 | End: 2022-01-31 | Stop reason: SDUPTHER

## 2021-01-25 NOTE — PROGRESS NOTES
Consent: Johana Gabriel, who was seen by synchronous (real-time) audio-video technology, and/or her healthcare decision maker, is aware that this patient-initiated, Telehealth encounter on 1/25/2021 is a billable service, with coverage as determined by her insurance carrier. She is aware that she may receive a bill and has provided verbal consent to proceed: YES-Consent obtained within past 12 months        712  Subjective:   Johana Gabriel is a 31 y.o. female who was seen for No chief complaint on file. Prior to Admission medications    Medication Sig Start Date End Date Taking? Authorizing Provider   dextroamphetamine-amphetamine (ADDERALL) 30 mg tablet Take 1 Tab by mouth daily. Max Daily Amount: 1 Tab. Indications: attention deficit disorder with hyperactivity 1/25/21  Yes Ac Plasencia MD   dextroamphetamine-amphetamine (ADDERALL) 30 mg tablet Take 1 Tab by mouth daily. Max Daily Amount: 1 Tab. 3/25/21  Yes Ac Plasencia MD   dextroamphetamine-amphetamine (ADDERALL) 30 mg tablet Take 1 Tab by mouth daily. Max Daily Amount: 1 Tab. 2/25/21  Yes Ac Plasencia MD   ALPRAZolam [facility]) 1 mg tablet TAKE 1 TABLET BY MOUTH DAILY AS NEEDED FOR ANXIETY 12/28/20   Ac Plasencia MD   [facility]) 250 mg tablet Take two tablets today then one tablet daily 12/23/20   Ac Plasencia MD   norethindrone-ethinyl estradiol (JUNEL FE 1/20) 1 mg-20 mcg (21)/75 mg (7) tab Take 1 Tab by mouth daily. 10/27/20   Queta Robbins MD   traZODone (DESYREL) 50 mg tablet Take 1 Tab by mouth nightly. 10/6/20   Ac Plasencia MD   ondansetron (ZOFRAN ODT) 4 mg disintegrating tablet Take 1 Tab by mouth every eight (8) hours as needed for Nausea or Vomiting. 10/6/20   Ac Plasencia MD   dextroamphetamine-amphetamine (ADDERALL) 30 mg tablet Take 1 Tab by mouth daily. Max Daily Amount: 1 Tab.  Indications: attention deficit disorder with hyperactivity 10/6/20 1/25/21  Ac Plasencia MD   dextroamphetamine-amphetamine (ADDERALL) 30 mg tablet Take 1 Tab by mouth daily. Max Daily Amount: 1 Tab. 12/6/20 1/25/21  Dorota Reyez MD   dextroamphetamine-amphetamine (ADDERALL) 30 mg tablet Take 1 Tab by mouth daily. Max Daily Amount: 1 Tab. 11/6/20 1/25/21  Dorota Reyez MD   dextroamphetamine-amphetamine (ADDERALL) 30 mg tablet Take 1 Tab by mouth daily. Max Daily Amount: 1 Tab. 6/25/20   Dorota Reyez MD     No Known Allergies    ROS    Objective:   Vital Signs: (As obtained by patient/caregiver at home)  There were no vitals taken for this visit. [      Assessment & Plan:   Diagnoses and all orders for this visit:    1. Attention deficit hyperactivity disorder (ADHD), combined type  -     dextroamphetamine-amphetamine (ADDERALL) 30 mg tablet; Take 1 Tab by mouth daily. Max Daily Amount: 1 Tab. Indications: attention deficit disorder with hyperactivity  -     dextroamphetamine-amphetamine (ADDERALL) 30 mg tablet; Take 1 Tab by mouth daily. Max Daily Amount: 1 Tab.  -     dextroamphetamine-amphetamine (ADDERALL) 30 mg tablet; Take 1 Tab by mouth daily. Max Daily Amount: 1 Tab.  -STABLE ON RX   -had covid exposure and no sxs, converted to VV        Follow-up and Dispositions    · Return in about 3 months (around 4/25/2021). We discussed the expected course, resolution and complications of the diagnosis(es) in detail. Medication risks, benefits, costs, interactions, and alternatives were discussed as indicated. I advised her to contact the office if her condition worsens, changes or fails to improve as anticipated. She expressed understanding with the diagnosis(es) and plan. Kumar Rivera is a 31 y.o. female being evaluated by a video visit encounter for concerns as above. A caregiver was present when appropriate.  Due to this being a TeleHealth encounter (During [facility]-06 public health emergency), evaluation of the following organ systems was limited: Vitals/Constitutional/EENT/Resp/CV/GI//MS/Neuro/Skin/Heme-Lymph-Imm. Pursuant to the emergency declaration under the [address] [place] [address], [facility] waiver authority and the [employer] Act, this Virtual  Visit was conducted, with patient's (and/or legal guardian's) consent, to reduce the patient's risk of exposure to COVID-19 and provide necessary medical care. Services were provided through a video synchronous discussion virtually to substitute for in-person clinic visit. Patient and provider were located at their individual homes.         Ana España MD

## 2021-04-04 DIAGNOSIS — F41.1 GAD (GENERALIZED ANXIETY DISORDER): ICD-10-CM

## 2021-04-05 RX ORDER — ALPRAZOLAM 1 MG/1
TABLET ORAL
Qty: 30 TAB | Refills: 1 | Status: SHIPPED | OUTPATIENT
Start: 2021-04-05 | End: 2021-07-10 | Stop reason: SDUPTHER

## 2021-04-05 RX ORDER — ONDANSETRON 4 MG/1
4 TABLET, ORALLY DISINTEGRATING ORAL
Qty: 20 TAB | Refills: 0 | Status: SHIPPED | OUTPATIENT
Start: 2021-04-05 | End: 2021-08-16 | Stop reason: ALTCHOICE

## 2021-06-08 RX ORDER — AZITHROMYCIN 250 MG/1
TABLET, FILM COATED ORAL
Qty: 6 TABLET | Refills: 0 | Status: SHIPPED | OUTPATIENT
Start: 2021-06-08 | End: 2021-07-14

## 2021-06-16 RX ORDER — FLUCONAZOLE 150 MG/1
150 TABLET ORAL DAILY
Qty: 1 TABLET | Refills: 5 | Status: SHIPPED | OUTPATIENT
Start: 2021-06-16 | End: 2021-06-17

## 2021-07-10 DIAGNOSIS — F41.1 GAD (GENERALIZED ANXIETY DISORDER): ICD-10-CM

## 2021-07-12 DIAGNOSIS — F41.1 GAD (GENERALIZED ANXIETY DISORDER): ICD-10-CM

## 2021-07-12 RX ORDER — ALPRAZOLAM 1 MG/1
TABLET ORAL
Qty: 30 TABLET | Refills: 1 | OUTPATIENT
Start: 2021-07-12

## 2021-07-12 RX ORDER — ALPRAZOLAM 1 MG/1
TABLET ORAL
Qty: 30 TABLET | Refills: 1 | Status: SHIPPED | OUTPATIENT
Start: 2021-07-12 | End: 2021-07-14 | Stop reason: SDUPTHER

## 2021-07-13 ENCOUNTER — HOSPITAL ENCOUNTER (EMERGENCY)
Age: 26
Discharge: LEFT BEFORE DISCHARGE | End: 2021-07-13
Payer: COMMERCIAL

## 2021-07-13 PROCEDURE — 75810000275 HC EMERGENCY DEPT VISIT NO LEVEL OF CARE

## 2021-07-14 ENCOUNTER — OFFICE VISIT (OUTPATIENT)
Dept: FAMILY MEDICINE CLINIC | Age: 26
End: 2021-07-14
Payer: COMMERCIAL

## 2021-07-14 VITALS
DIASTOLIC BLOOD PRESSURE: 64 MMHG | HEART RATE: 82 BPM | OXYGEN SATURATION: 98 % | SYSTOLIC BLOOD PRESSURE: 95 MMHG | TEMPERATURE: 97.7 F | BODY MASS INDEX: 34.25 KG/M2 | WEIGHT: 200.6 LBS | HEIGHT: 64 IN | RESPIRATION RATE: 19 BRPM

## 2021-07-14 DIAGNOSIS — F41.1 GAD (GENERALIZED ANXIETY DISORDER): ICD-10-CM

## 2021-07-14 DIAGNOSIS — F90.2 ATTENTION DEFICIT HYPERACTIVITY DISORDER (ADHD), COMBINED TYPE: ICD-10-CM

## 2021-07-14 DIAGNOSIS — F34.1 DYSTHYMIA: Primary | ICD-10-CM

## 2021-07-14 PROCEDURE — 99214 OFFICE O/P EST MOD 30 MIN: CPT | Performed by: FAMILY MEDICINE

## 2021-07-14 RX ORDER — SERTRALINE HYDROCHLORIDE 50 MG/1
50 TABLET, FILM COATED ORAL DAILY
Qty: 30 TABLET | Refills: 2 | Status: SHIPPED | OUTPATIENT
Start: 2021-07-14 | End: 2021-08-16

## 2021-07-14 RX ORDER — DEXTROAMPHETAMINE SACCHARATE, AMPHETAMINE ASPARTATE, DEXTROAMPHETAMINE SULFATE AND AMPHETAMINE SULFATE 7.5; 7.5; 7.5; 7.5 MG/1; MG/1; MG/1; MG/1
30 TABLET ORAL DAILY
Qty: 30 TABLET | Refills: 0 | Status: SHIPPED | OUTPATIENT
Start: 2021-08-13 | End: 2021-09-12

## 2021-07-14 RX ORDER — ALPRAZOLAM 1 MG/1
TABLET ORAL
Qty: 30 TABLET | Refills: 1 | Status: SHIPPED | OUTPATIENT
Start: 2021-07-14 | End: 2022-01-31 | Stop reason: SDUPTHER

## 2021-07-14 RX ORDER — DEXTROAMPHETAMINE SACCHARATE, AMPHETAMINE ASPARTATE, DEXTROAMPHETAMINE SULFATE AND AMPHETAMINE SULFATE 7.5; 7.5; 7.5; 7.5 MG/1; MG/1; MG/1; MG/1
30 TABLET ORAL DAILY
Qty: 30 TABLET | Refills: 0 | Status: SHIPPED | OUTPATIENT
Start: 2021-09-12 | End: 2021-10-12

## 2021-07-14 RX ORDER — NORETHINDRONE ACETATE AND ETHINYL ESTRADIOL, ETHINYL ESTRADIOL AND FERROUS FUMARATE 1MG-10(24)
KIT ORAL
COMMUNITY
End: 2021-08-16 | Stop reason: ALTCHOICE

## 2021-07-14 RX ORDER — DEXTROAMPHETAMINE SACCHARATE, AMPHETAMINE ASPARTATE, DEXTROAMPHETAMINE SULFATE AND AMPHETAMINE SULFATE 7.5; 7.5; 7.5; 7.5 MG/1; MG/1; MG/1; MG/1
30 TABLET ORAL DAILY
Qty: 30 TABLET | Refills: 0 | Status: SHIPPED | OUTPATIENT
Start: 2021-07-14 | End: 2021-08-13

## 2021-07-14 NOTE — PROGRESS NOTES
Progress Note    she is a 31y.o. year old female who presents for evalution. Subjective:     Pt here for refill of adderall and has been working well. Med is working well for her. Some days if she just has a morning class zoe take 1/2 of dose so 15mg so she can sleep after(works overnights). Using xanax prn for anxiety, going thru custody escalante concerning her daughter. She is interested in starting something for her anxiety and depression. Feels irritable at times. Sleep goes back and forth. No seizure hx. Weight gain as well. Reviewed PmHx, RxHx, FmHx, SocHx, AllgHx and updated and dated in the chart. Review of Systems - negative except as listed above in the HPI    Objective:     Vitals:    07/14/21 0905   BP: 95/64   Pulse: 82   Resp: 19   Temp: 97.7 °F (36.5 °C)   TempSrc: Oral   SpO2: 98%   Weight: 200 lb 9.6 oz (91 kg)   Height: 5' 4\" (1.626 m)       Current Outpatient Medications   Medication Sig    norethindrone-e.estradioL-iron (Lo Loestrin Fe) 1 mg-10 mcg (24)/10 mcg (2) tab Lo Loestrin Fe 1 mg-10 mcg (24)/10 mcg (2) tablet    ALPRAZolam (XANAX) 1 mg tablet TAKE 1 TABLET BY MOUTH DAILY AS NEEDED FOR ANXIETY    [START ON 9/12/2021] dextroamphetamine-amphetamine (ADDERALL) 30 mg tablet Take 1 Tablet by mouth daily for 30 days. Max Daily Amount: 1 Tablet.  [START ON 8/13/2021] dextroamphetamine-amphetamine (ADDERALL) 30 mg tablet Take 1 Tablet by mouth daily for 30 days. Max Daily Amount: 1 Tablet.  dextroamphetamine-amphetamine (ADDERALL) 30 mg tablet Take 1 Tablet by mouth daily for 30 days. Max Daily Amount: 1 Tablet. Indications: attention deficit disorder with hyperactivity    sertraline (ZOLOFT) 50 mg tablet Take 1 Tablet by mouth daily.  ondansetron (ZOFRAN ODT) 4 mg disintegrating tablet Take 1 Tab by mouth every eight (8) hours as needed for Nausea or Vomiting.  dextroamphetamine-amphetamine (ADDERALL) 30 mg tablet Take 1 Tab by mouth daily.  Max Daily Amount: 1 Tab. Indications: attention deficit disorder with hyperactivity    dextroamphetamine-amphetamine (ADDERALL) 30 mg tablet Take 1 Tab by mouth daily. Max Daily Amount: 1 Tab.  dextroamphetamine-amphetamine (ADDERALL) 30 mg tablet Take 1 Tab by mouth daily. Max Daily Amount: 1 Tab.  azithromycin (ZITHROMAX) 250 mg tablet Take two tablets today then one tablet daily (Patient not taking: Reported on 7/14/2021)    azithromycin (ZITHROMAX) 250 mg tablet Take two tablets today then one tablet daily    norethindrone-ethinyl estradiol (JUNEL FE 1/20) 1 mg-20 mcg (21)/75 mg (7) tab Take 1 Tab by mouth daily.  traZODone (DESYREL) 50 mg tablet Take 1 Tab by mouth nightly.  dextroamphetamine-amphetamine (ADDERALL) 30 mg tablet Take 1 Tab by mouth daily. Max Daily Amount: 1 Tab. No current facility-administered medications for this visit. Physical Examination: General appearance - alert, well appearing, and in no distress  Mental status - alert, oriented to person, place, and time  Chest - clear to auscultation, no wheezes, rales or rhonchi, symmetric air entry  Heart - normal rate, regular rhythm, normal S1, S2, no murmurs, rubs, clicks or gallops      Assessment/ Plan:   Diagnoses and all orders for this visit:    1. Dysthymia  -     sertraline (ZOLOFT) 50 mg tablet; Take 1 Tablet by mouth daily. 2. ROSALIO (generalized anxiety disorder)  -     ALPRAZolam (XANAX) 1 mg tablet; TAKE 1 TABLET BY MOUTH DAILY AS NEEDED FOR ANXIETY  -     sertraline (ZOLOFT) 50 mg tablet; Take 1 Tablet by mouth daily. 3. Attention deficit hyperactivity disorder (ADHD), combined type  -     dextroamphetamine-amphetamine (ADDERALL) 30 mg tablet; Take 1 Tablet by mouth daily for 30 days. Max Daily Amount: 1 Tablet. -     dextroamphetamine-amphetamine (ADDERALL) 30 mg tablet; Take 1 Tablet by mouth daily for 30 days. Max Daily Amount: 1 Tablet. -     dextroamphetamine-amphetamine (ADDERALL) 30 mg tablet;  Take 1 Tablet by mouth daily for 30 days. Max Daily Amount: 1 Tablet. Indications: attention deficit disorder with hyperactivity       Follow-up and Dispositions    · Return in about 6 weeks (around 8/25/2021), or if symptoms worsen or fail to improve. I have discussed the diagnosis with the patient and the intended plan as seen in the above orders. The patient has received an after-visit summary and questions were answered concerning future plans. Pt conveyed understanding of plan.     Medication Side Effects and Warnings were discussed with patient    An electronic signature was used to authenticate this note  Melva Balderas DO

## 2021-07-14 NOTE — PROGRESS NOTES
Chief Complaint   Patient presents with    Medication Refill     Patient is present in office for med refills. Pt states she prefers breaking her ADHD med in half. 15 in the morning 15 in the evening. Wishes to discuss. 1. Have you been to the ER, urgent care clinic since your last visit? Hospitalized since your last visit? No    2. Have you seen or consulted any other health care providers outside of the [address] [place] [address] since your last visit? Include any pap smears or colon screening.  No

## 2021-07-14 NOTE — PATIENT INSTRUCTIONS
A Healthy Lifestyle: Care Instructions  Your Care Instructions     A healthy lifestyle can help you feel good, stay at a healthy weight, and have plenty of energy for both work and play. A healthy lifestyle is something you can share with your whole family. A healthy lifestyle also can lower your risk for serious health problems, such as high blood pressure, heart disease, and diabetes. You can follow a few steps listed below to improve your health and the health of your family. Follow-up care is a key part of your treatment and safety. Be sure to make and go to all appointments, and call your doctor if you are having problems. It's also a good idea to know your test results and keep a list of the medicines you take. How can you care for yourself at home? · Do not eat too much sugar, fat, or fast foods. You can still have dessert and treats now and then. The goal is moderation. · Start small to improve your eating habits. Pay attention to portion sizes, drink less juice and soda pop, and eat more fruits and vegetables. ? Eat a healthy amount of food. A 3-ounce serving of meat, for example, is about the size of a deck of cards. Fill the rest of your plate with vegetables and whole grains. ? Limit the amount of soda and sports drinks you have every day. Drink more water when you are thirsty. ? Eat plenty of fruits and vegetables every day. Have an apple or some carrot sticks as an afternoon snack instead of a candy bar. Try to have fruits and/or vegetables at every meal.  · Make exercise part of your daily routine. You may want to start with simple activities, such as walking, bicycling, or slow swimming. Try to be active 30 to 60 minutes every day. You do not need to do all 30 to 60 minutes all at once. For example, you can exercise 3 times a day for 10 or 20 minutes.  Moderate exercise is safe for most people, but it is always a good idea to talk to your doctor before starting an exercise program.  · Keep moving. Tanesha Jessica the lawn, work in the garden, or [employer]. Take the stairs instead of the elevator at work. · If you smoke, quit. People who smoke have an increased risk for heart attack, stroke, cancer, and other lung illnesses. Quitting is hard, but there are ways to boost your chance of quitting tobacco for good. ? Use nicotine gum, patches, or lozenges. ? Ask your doctor about stop-smoking programs and medicines. ? Keep trying. In addition to reducing your risk of diseases in the future, you will notice some benefits soon after you stop using tobacco. If you have shortness of breath or asthma symptoms, they will likely get better within a few weeks after you quit. · Limit how much alcohol you drink. Moderate amounts of alcohol (up to 2 drinks a day for men, 1 drink a day for women) are okay. But drinking too much can lead to liver problems, high blood pressure, and other health problems. Family health  If you have a family, there are many things you can do together to improve your health. · Eat meals together as a family as often as possible. · Eat healthy foods. This includes fruits, vegetables, lean meats and dairy, and whole grains. · Include your family in your fitness plan. Most people think of activities such as jogging or tennis as the way to fitness, but there are many ways you and your family can be more active. Anything that makes you breathe hard and gets your heart pumping is exercise. Here are some tips:  ? Walk to do errands or to take your child to school or the bus.  ? Go for a family bike ride after dinner instead of watching TV. Where can you learn more? Go to http://www.gray.com/  Enter Y093 in the search box to learn more about \"A Healthy Lifestyle: Care Instructions. \"  Current as of: September 23, 2020               Content Version: 12.8  © 1484-4562 [employer].    Care instructions adapted under license by [employer] (which disclaims liability or warranty for this information). If you have questions about a medical condition or this instruction, always ask your healthcare professional. [address] any warranty or liability for your use of this information.

## 2021-07-27 ENCOUNTER — OFFICE VISIT (OUTPATIENT)
Dept: OBGYN CLINIC | Age: 26
End: 2021-07-27
Payer: COMMERCIAL

## 2021-07-27 VITALS
BODY MASS INDEX: 34.06 KG/M2 | WEIGHT: 199.5 LBS | DIASTOLIC BLOOD PRESSURE: 80 MMHG | HEIGHT: 64 IN | SYSTOLIC BLOOD PRESSURE: 142 MMHG

## 2021-07-27 DIAGNOSIS — Z11.51 SCREENING FOR HUMAN PAPILLOMAVIRUS: ICD-10-CM

## 2021-07-27 DIAGNOSIS — Z11.3 SCREENING FOR VENEREAL DISEASE: ICD-10-CM

## 2021-07-27 DIAGNOSIS — Z84.2 FAMILY HISTORY OF PCOS: ICD-10-CM

## 2021-07-27 DIAGNOSIS — Z01.419 PAP SMEAR, AS PART OF ROUTINE GYNECOLOGICAL EXAMINATION: Primary | ICD-10-CM

## 2021-07-27 PROCEDURE — 99385 PREV VISIT NEW AGE 18-39: CPT | Performed by: OBSTETRICS & GYNECOLOGY

## 2021-07-27 NOTE — PROGRESS NOTES
Jeremi Delcid is a 31 y.o. female who presents today for the following:  Chief Complaint   Patient presents with    Annual Exam     Pt presents for annual exam with complaints of weight gain, irregular cycles and facial hair. Pt would like to be tested for PCOS //MKeeley     PCOS        Allergies   Allergen Reactions    Latex Rash    Hydrocodone Nausea and Vomiting       Current Outpatient Medications   Medication Sig    norethindrone-e.estradioL-iron (Lo Loestrin Fe) 1 mg-10 mcg (24)/10 mcg (2) tab Lo Loestrin Fe 1 mg-10 mcg (24)/10 mcg (2) tablet    ALPRAZolam (XANAX) 1 mg tablet TAKE 1 TABLET BY MOUTH DAILY AS NEEDED FOR ANXIETY    [START ON 9/12/2021] dextroamphetamine-amphetamine (ADDERALL) 30 mg tablet Take 1 Tablet by mouth daily for 30 days. Max Daily Amount: 1 Tablet.  [START ON 8/13/2021] dextroamphetamine-amphetamine (ADDERALL) 30 mg tablet Take 1 Tablet by mouth daily for 30 days. Max Daily Amount: 1 Tablet.  dextroamphetamine-amphetamine (ADDERALL) 30 mg tablet Take 1 Tablet by mouth daily for 30 days. Max Daily Amount: 1 Tablet. Indications: attention deficit disorder with hyperactivity    sertraline (ZOLOFT) 50 mg tablet Take 1 Tablet by mouth daily.  ondansetron (ZOFRAN ODT) 4 mg disintegrating tablet Take 1 Tab by mouth every eight (8) hours as needed for Nausea or Vomiting.  dextroamphetamine-amphetamine (ADDERALL) 30 mg tablet Take 1 Tab by mouth daily. Max Daily Amount: 1 Tab. Indications: attention deficit disorder with hyperactivity    dextroamphetamine-amphetamine (ADDERALL) 30 mg tablet Take 1 Tab by mouth daily. Max Daily Amount: 1 Tab.  dextroamphetamine-amphetamine (ADDERALL) 30 mg tablet Take 1 Tab by mouth daily. Max Daily Amount: 1 Tab. No current facility-administered medications for this visit.        Past Medical History:   Diagnosis Date    Excessive or frequent menstruation 4/23/13    Hx of abnormal Pap smear 12/16/2013    ASCUS +HPV    IUD (intrauterine device) in place 10/24/14 mirena resulted in perforation--- Mirena inserted 2017    Mirena placed    Need for prophylactic vaccination and inoculation against other viral diseases(V04.89)     Gardasil injections completed all 3 previously.  Ruptured ovarian cyst        Past Surgical History:   Procedure Laterality Date    HX  SECTION  14    HX DILATION AND CURETTAGE  10/2016       Family History   Problem Relation Age of Onset    Hypertension Father     Hypertension Mother        Social History     Socioeconomic History    Marital status: SINGLE     Spouse name: Not on file    Number of children: Not on file    Years of education: Not on file    Highest education level: Not on file   Occupational History    Not on file   Tobacco Use    Smoking status: Never Smoker    Smokeless tobacco: Never Used   Substance and Sexual Activity    Alcohol use: No    Drug use: No    Sexual activity: Yes     Partners: Male     Birth control/protection: I.U.D. Other Topics Concern    Not on file   Social History Narrative    Not on file     Social Determinants of Health     Financial Resource Strain:     Difficulty of Paying Living Expenses:    Food Insecurity:     Worried About Running Out of Food in the Last Year:     [address] in the Last Year:    Transportation Needs:     Lack of Transportation (Medical):      Lack of Transportation (Non-Medical):    Physical Activity:     Days of Exercise per Week:     Minutes of Exercise per Session:    Stress:     Feeling of Stress :    Social Connections:     Frequency of Communication with Friends and Family:     Frequency of Social Gatherings with Friends and Family:     Attends Adventism Services:     Active Member of Clubs or Organizations:     Attends Club or Organization Meetings:     Marital Status:    Intimate Partner Violence:     Fear of Current or Ex-Partner:     Emotionally Abused:     Physically Abused:     Sexually Abused:          ROS   Review of Systems   Constitutional: Negative. HENT: Negative. Eyes: Negative. Respiratory: Negative. Cardiovascular: Negative. Gastrointestinal: Negative. Genitourinary: Negative. Musculoskeletal: Negative. Skin: Negative. Neurological: Negative. Endo/Heme/Allergies: Negative. Psychiatric/Behavioral: Negative. BP (!) 142/80   Ht 5' 4\" (1.626 m)   Wt 199 lb 8 oz (90.5 kg)   LMP 07/13/2021   BMI 34.24 kg/m²    OBGyn Exam   Constitutional  · Appearance: well-nourished, well developed, alert, in no acute distress    HENT  · Head and Face: appears normal    Neck  · Inspection/Palpation: normal appearance, no masses or tenderness  · Lymph Nodes: no lymphadenopathy present  · Thyroid: gland size normal, nontender, no nodules or masses present on palpation    Breasts   Symmetric, no palpable masses, no tenderness, no skin changes, no nipple abnormality, no nipple discharge, no lymphadenopathy.     Chest  · Respiratory Effort: breathing labored  · Auscultation: normal breath sounds    Cardiovascular  · Heart:  · Auscultation: regular rate and rhythm without murmur    Gastrointestinal  · Abdominal Examination: abdomen non-tender to palpation, normal bowel sounds, no masses present  · Liver and spleen: no hepatomegaly present, spleen not palpable  · Hernias: no hernias identified    Genitourinary  · External Genitalia: normal appearance for age, no discharge present, no tenderness present, no inflammatory lesions present, no masses present, no atrophy present  · Vagina: normal vaginal vault without central or paravaginal defects, no discharge present, no inflammatory lesions present, no masses present  · Bladder: non-tender to palpation  · Urethra: appears normal  · Cervix: normal   · Uterus: normal size, shape and consistency  · Adnexa: no adnexal tenderness present, no adnexal masses present  · Perineum: perineum within normal limits, no evidence of trauma, no rashes or skin lesions present  · Anus: anus within normal limits, no hemorrhoids present  · Inguinal Lymph Nodes: no lymphadenopathy present    Skin  · General Inspection: no rash, no lesions identified    Neurologic/Psychiatric  · Mental Status:  · Orientation: grossly oriented to person, place and time  · Mood and Affect: mood normal, affect appropriate    No results found for this visit on 07/27/21. Orders Placed This Encounter    CBC WITH AUTOMATED DIFF    METABOLIC PANEL, COMPREHENSIVE    TESTOSTERONE, FREE+TOTAL    DHEA SULFATE    TSH AND FREE T4    PROLACTIN    HEMOGLOBIN A1C WITH EAG    PAP IG, CT-NG-TV, RFX APTIMA HPV ASCUS ([id number])     Order Specific Question:   Pap Source? Answer:   Cervical     Order Specific Question:   Total Hysterectomy? Answer:   No     Order Specific Question:   Supracervical Hysterectomy? Answer:   No     Order Specific Question:   Post Menopausal?     Answer:   No     Order Specific Question:   Hormone Therapy? Answer:   No     Order Specific Question:   IUD? Answer:   No     Order Specific Question:   Abnormal Bleeding? Answer:   No     Order Specific Question:   Pregnant     Answer:   No     Order Specific Question:   Post Partum? Answer:   No     30 yo ANNUAL  SX/SY OF PCOS    1. Pap smear, as part of routine gynecological examination    - PAP IG, CT-NG-TV, RFX APTIMA HPV ASCUS ([id number])    2. Screening for human papillomavirus    - PAP IG, CT-NG-TV, RFX APTIMA HPV ASCUS ([id number])    3. Screening for venereal disease    - PAP IG, CT-NG-TV, RFX APTIMA HPV ASCUS ([id number])    4.  Family history of PCOS    - CBC WITH AUTOMATED DIFF  - METABOLIC PANEL, COMPREHENSIVE  - TESTOSTERONE, FREE+TOTAL  - DHEA SULFATE  - TSH AND FREE T4  - PROLACTIN  - HEMOGLOBIN A1C WITH EAG      RTC IN 3 WEEKS

## 2021-07-30 LAB
ALBUMIN SERPL-MCNC: 4.6 G/DL (ref 3.9–5)
ALBUMIN/GLOB SERPL: 2 {RATIO} (ref 1.2–2.2)
ALP SERPL-CCNC: 93 IU/L (ref 48–121)
ALT SERPL-CCNC: 41 IU/L (ref 0–32)
AST SERPL-CCNC: 26 IU/L (ref 0–40)
BASOPHILS # BLD AUTO: 0 X10E3/UL (ref 0–0.2)
BASOPHILS NFR BLD AUTO: 0 %
BILIRUB SERPL-MCNC: 0.3 MG/DL (ref 0–1.2)
BUN SERPL-MCNC: 14 MG/DL (ref 6–20)
BUN/CREAT SERPL: 16 (ref 9–23)
C TRACH RRNA CVX QL NAA+PROBE: NEGATIVE
CALCIUM SERPL-MCNC: 9.6 MG/DL (ref 8.7–10.2)
CHLORIDE SERPL-SCNC: 105 MMOL/L (ref 96–106)
CO2 SERPL-SCNC: 22 MMOL/L (ref 20–29)
CREAT SERPL-MCNC: 0.89 MG/DL (ref 0.57–1)
CYTOLOGIST CVX/VAG CYTO: NORMAL
CYTOLOGY CVX/VAG DOC CYTO: NORMAL
CYTOLOGY CVX/VAG DOC THIN PREP: NORMAL
DHEA-S SERPL-MCNC: 474 UG/DL (ref 84.8–378)
DX ICD CODE: NORMAL
EOSINOPHIL # BLD AUTO: 0.1 X10E3/UL (ref 0–0.4)
EOSINOPHIL NFR BLD AUTO: 1 %
ERYTHROCYTE [DISTWIDTH] IN BLOOD BY AUTOMATED COUNT: 12.1 % (ref 11.7–15.4)
EST. AVERAGE GLUCOSE BLD GHB EST-MCNC: 103 MG/DL
GLOBULIN SER CALC-MCNC: 2.3 G/DL (ref 1.5–4.5)
GLUCOSE SERPL-MCNC: 88 MG/DL (ref 65–99)
HBA1C MFR BLD: 5.2 % (ref 4.8–5.6)
HCT VFR BLD AUTO: 41.1 % (ref 34–46.6)
HGB BLD-MCNC: 13.5 G/DL (ref 11.1–15.9)
IMM GRANULOCYTES # BLD AUTO: 0 X10E3/UL (ref 0–0.1)
IMM GRANULOCYTES NFR BLD AUTO: 0 %
LABCORP, 190119: NORMAL
LYMPHOCYTES # BLD AUTO: 3.2 X10E3/UL (ref 0.7–3.1)
LYMPHOCYTES NFR BLD AUTO: 30 %
Lab: NORMAL
Lab: NORMAL
MCH RBC QN AUTO: 30.5 PG (ref 26.6–33)
MCHC RBC AUTO-ENTMCNC: 32.8 G/DL (ref 31.5–35.7)
MCV RBC AUTO: 93 FL (ref 79–97)
MONOCYTES # BLD AUTO: 0.9 X10E3/UL (ref 0.1–0.9)
MONOCYTES NFR BLD AUTO: 8 %
N GONORRHOEA RRNA CVX QL NAA+PROBE: NEGATIVE
NEUTROPHILS # BLD AUTO: 6.4 X10E3/UL (ref 1.4–7)
NEUTROPHILS NFR BLD AUTO: 61 %
OTHER STN SPEC: NORMAL
PLATELET # BLD AUTO: 237 X10E3/UL (ref 150–450)
POTASSIUM SERPL-SCNC: 4.2 MMOL/L (ref 3.5–5.2)
PROLACTIN SERPL-MCNC: 24.8 NG/ML (ref 4.8–23.3)
PROT SERPL-MCNC: 6.9 G/DL (ref 6–8.5)
RBC # BLD AUTO: 4.43 X10E6/UL (ref 3.77–5.28)
SODIUM SERPL-SCNC: 140 MMOL/L (ref 134–144)
STAT OF ADQ CVX/VAG CYTO-IMP: NORMAL
T VAGINALIS RRNA SPEC QL NAA+PROBE: NEGATIVE
T4 FREE SERPL-MCNC: 1.32 NG/DL (ref 0.82–1.77)
TESTOST FREE SERPL-MCNC: 3.5 PG/ML (ref 0–4.2)
TESTOST SERPL-MCNC: 48.2 NG/DL (ref 10–55)
TSH SERPL DL<=0.005 MIU/L-ACNC: 1.36 UIU/ML (ref 0.45–4.5)
WBC # BLD AUTO: 10.7 X10E3/UL (ref 3.4–10.8)

## 2021-08-16 ENCOUNTER — OFFICE VISIT (OUTPATIENT)
Dept: OBGYN CLINIC | Age: 26
End: 2021-08-16
Payer: COMMERCIAL

## 2021-08-16 VITALS
DIASTOLIC BLOOD PRESSURE: 72 MMHG | BODY MASS INDEX: 34.43 KG/M2 | HEIGHT: 64 IN | SYSTOLIC BLOOD PRESSURE: 134 MMHG | WEIGHT: 201.7 LBS

## 2021-08-16 DIAGNOSIS — E28.2 PCOS (POLYCYSTIC OVARIAN SYNDROME): Primary | ICD-10-CM

## 2021-08-16 PROCEDURE — 99214 OFFICE O/P EST MOD 30 MIN: CPT | Performed by: OBSTETRICS & GYNECOLOGY

## 2021-08-16 RX ORDER — SPIRONOLACTONE 100 MG/1
100 TABLET, FILM COATED ORAL DAILY
Qty: 30 TABLET | Refills: 3 | Status: SHIPPED | OUTPATIENT
Start: 2021-08-16 | End: 2022-01-31 | Stop reason: ALTCHOICE

## 2021-08-16 RX ORDER — METFORMIN HYDROCHLORIDE 500 MG/1
500 TABLET ORAL 2 TIMES DAILY WITH MEALS
Qty: 120 TABLET | Refills: 3 | Status: SHIPPED | OUTPATIENT
Start: 2021-08-16 | End: 2021-11-02 | Stop reason: ALTCHOICE

## 2021-08-16 NOTE — PROGRESS NOTES
Rachel Farnsworth is a 31 y.o. female who presents today for the following:  Chief Complaint   Patient presents with    Follow-up     patient presents to discuss u/s and lab results. Roseanne MAHER        Allergies   Allergen Reactions    Latex Rash    Hydrocodone Nausea and Vomiting       Current Outpatient Medications   Medication Sig    metFORMIN (GLUCOPHAGE) 500 mg tablet Take 1 Tablet by mouth two (2) times daily (with meals).  spironolactone (ALDACTONE) 100 mg tablet Take 1 Tablet by mouth daily.  ALPRAZolam (XANAX) 1 mg tablet TAKE 1 TABLET BY MOUTH DAILY AS NEEDED FOR ANXIETY    [START ON 2021] dextroamphetamine-amphetamine (ADDERALL) 30 mg tablet Take 1 Tablet by mouth daily for 30 days. Max Daily Amount: 1 Tablet.  dextroamphetamine-amphetamine (ADDERALL) 30 mg tablet Take 1 Tablet by mouth daily for 30 days. Max Daily Amount: 1 Tablet.  dextroamphetamine-amphetamine (ADDERALL) 30 mg tablet Take 1 Tab by mouth daily. Max Daily Amount: 1 Tab. Indications: attention deficit disorder with hyperactivity    dextroamphetamine-amphetamine (ADDERALL) 30 mg tablet Take 1 Tab by mouth daily. Max Daily Amount: 1 Tab.  dextroamphetamine-amphetamine (ADDERALL) 30 mg tablet Take 1 Tab by mouth daily. Max Daily Amount: 1 Tab. No current facility-administered medications for this visit. Past Medical History:   Diagnosis Date    Excessive or frequent menstruation 13    Hx of abnormal Pap smear 2013    ASCUS +HPV    IUD (intrauterine device) in place 10/24/14 mirena resulted in perforation--- Mirena inserted 2017    Mirena placed    Need for prophylactic vaccination and inoculation against other viral diseases(V04.89)     Gardasil injections completed all 3 previously.     Ruptured ovarian cyst        Past Surgical History:   Procedure Laterality Date    HX  SECTION  14    HX DILATION AND CURETTAGE  10/2016       Family History   Problem Relation Age of Onset  Hypertension Father     Hypertension Mother        Social History     Socioeconomic History    Marital status: SINGLE     Spouse name: Not on file    Number of children: Not on file    Years of education: Not on file    Highest education level: Not on file   Occupational History    Not on file   Tobacco Use    Smoking status: Never Smoker    Smokeless tobacco: Never Used   Substance and Sexual Activity    Alcohol use: No    Drug use: No    Sexual activity: Yes     Partners: Male     Birth control/protection: I.U.D. Other Topics Concern    Not on file   Social History Narrative    Not on file     Social Determinants of Health     Financial Resource Strain:     Difficulty of Paying Living Expenses:    Food Insecurity:     Worried About Running Out of Food in the Last Year:     [address] in the Last Year:    Transportation Needs:     Lack of Transportation (Medical):  Lack of Transportation (Non-Medical):    Physical Activity:     Days of Exercise per Week:     Minutes of Exercise per Session:    Stress:     Feeling of Stress :    Social Connections:     Frequency of Communication with Friends and Family:     Frequency of Social Gatherings with Friends and Family:     Attends Anabaptism Services:     Active Member of Clubs or Organizations:     Attends Club or Organization Meetings:     Marital Status:    Intimate Partner Violence:     Fear of Current or Ex-Partner:     Emotionally Abused:     Physically Abused:     Sexually Abused:          ROS   Review of Systems   Constitutional: Negative. HENT: Negative. Eyes: Negative. Respiratory: Negative. Cardiovascular: Negative. Gastrointestinal: Negative. Genitourinary: Negative. Musculoskeletal: Negative. Skin: Negative. Neurological: Negative. Endo/Heme/Allergies: Negative. Psychiatric/Behavioral: Negative.       /72   Ht 5' 4\" (1.626 m)   Wt 201 lb 11.2 oz (91.5 kg)   LMP 08/09/2021 BMI 34.62 kg/m²    OBGyn Exam   Constitutional  · Appearance: well-nourished, well developed, alert, in no acute distress    HENT  · Head and Face: appears normal    Chest  · Respiratory Effort: breathing labored    Gastrointestinal  · Abdominal Examination: abdomen non-tender to palpation, normal bowel sounds, no masses present    Genitourinary  DEFERRED  Skin  · General Inspection: no rash, no lesions identified    Neurologic/Psychiatric  · Mental Status:  · Orientation: grossly oriented to person, place and time  · Mood and Affect: mood normal, affect appropriate    No results found for this visit on 08/16/21. Orders Placed This Encounter    metFORMIN (GLUCOPHAGE) 500 mg tablet     Sig: Take 1 Tablet by mouth two (2) times daily (with meals). Dispense:  120 Tablet     Refill:  3    spironolactone (ALDACTONE) 100 mg tablet     Sig: Take 1 Tablet by mouth daily. Dispense:  30 Tablet     Refill:  3     26 YO WITH IRREG PERIODS, OBESITY  COMING TO DISCUSS LABD AND U/S  LABS WITH INCREASED TESTO  TVUS WITH MULTICYSTIC OVARIES    1. PCOS (polycystic ovarian syndrome)  - DISCUSSED FINDINGS, LABS AND U/S  ADDRESSED POC  WILL START PCOS MEDS, RTC IN 2 MONTHS    - metFORMIN (GLUCOPHAGE) 500 mg tablet; Take 1 Tablet by mouth two (2) times daily (with meals). Dispense: 120 Tablet; Refill: 3  - spironolactone (ALDACTONE) 100 mg tablet; Take 1 Tablet by mouth daily. Dispense: 30 Tablet;  Refill: 3    35 MIN

## 2021-09-17 ENCOUNTER — TELEPHONE (OUTPATIENT)
Dept: OBGYN CLINIC | Age: 26
End: 2021-09-17

## 2021-09-17 NOTE — TELEPHONE ENCOUNTER
Returned the patient's call and she is c/o fatigue and nausea. States she was started on medication for PCOS last month and thinks it is related to that. Per Dr Josh Jerry, patient advised to take Metformin 500 mg every day for 3 weeks and then add half a tablet in the evening. She is due for a follow up appt on 10/11/21.

## 2021-10-11 ENCOUNTER — OFFICE VISIT (OUTPATIENT)
Dept: OBGYN CLINIC | Age: 26
End: 2021-10-11
Payer: COMMERCIAL

## 2021-10-11 VITALS
BODY MASS INDEX: 35.02 KG/M2 | DIASTOLIC BLOOD PRESSURE: 79 MMHG | WEIGHT: 205.13 LBS | HEIGHT: 64 IN | SYSTOLIC BLOOD PRESSURE: 136 MMHG

## 2021-10-11 DIAGNOSIS — E28.2 PCOS (POLYCYSTIC OVARIAN SYNDROME): Primary | ICD-10-CM

## 2021-10-11 PROCEDURE — 99214 OFFICE O/P EST MOD 30 MIN: CPT | Performed by: OBSTETRICS & GYNECOLOGY

## 2021-10-11 RX ORDER — SPIRONOLACTONE 100 MG/1
100 TABLET, FILM COATED ORAL DAILY
Qty: 30 TABLET | Refills: 5 | Status: SHIPPED | OUTPATIENT
Start: 2021-10-11 | End: 2022-01-31 | Stop reason: ALTCHOICE

## 2021-10-11 RX ORDER — GLYBURIDE 2.5 MG/1
2.5 TABLET ORAL 2 TIMES DAILY WITH MEALS
Qty: 60 TABLET | Refills: 3 | Status: SHIPPED | OUTPATIENT
Start: 2021-10-11 | End: 2022-01-31 | Stop reason: ALTCHOICE

## 2021-10-11 RX ORDER — NORGESTIMATE AND ETHINYL ESTRADIOL 0.25-0.035
1 KIT ORAL DAILY
Qty: 63 EACH | Refills: 1 | Status: SHIPPED | OUTPATIENT
Start: 2021-10-11 | End: 2021-12-13

## 2021-10-11 NOTE — PROGRESS NOTES
Rita Garcia is a 31 y.o. female who presents today for the following:  Chief Complaint   Patient presents with    Medication Check     Pt presents for follow up from starting PCOS medication on 8/16/21 . Pt states metformin causing nausea, only taking a 1/2 pill a day and extreme fatigue //MKeeley     PCOS        Allergies   Allergen Reactions    Latex Rash    Hydrocodone Nausea and Vomiting       Current Outpatient Medications   Medication Sig    norgestimate-ethinyl estradioL (Sprintec, 28,) 0.25-35 mg-mcg tab Take 1 Tablet by mouth daily for 63 days. Take continuously, only active pills    spironolactone (ALDACTONE) 100 mg tablet Take 1 Tablet by mouth daily.  glyBURIDE (DIABETA) 2.5 mg tablet Take 1 Tablet by mouth two (2) times daily (with meals).  metFORMIN (GLUCOPHAGE) 500 mg tablet Take 1 Tablet by mouth two (2) times daily (with meals).  spironolactone (ALDACTONE) 100 mg tablet Take 1 Tablet by mouth daily.  ALPRAZolam (XANAX) 1 mg tablet TAKE 1 TABLET BY MOUTH DAILY AS NEEDED FOR ANXIETY    dextroamphetamine-amphetamine (ADDERALL) 30 mg tablet Take 1 Tablet by mouth daily for 30 days. Max Daily Amount: 1 Tablet.  dextroamphetamine-amphetamine (ADDERALL) 30 mg tablet Take 1 Tab by mouth daily. Max Daily Amount: 1 Tab. Indications: attention deficit disorder with hyperactivity    dextroamphetamine-amphetamine (ADDERALL) 30 mg tablet Take 1 Tab by mouth daily. Max Daily Amount: 1 Tab.  dextroamphetamine-amphetamine (ADDERALL) 30 mg tablet Take 1 Tab by mouth daily. Max Daily Amount: 1 Tab. No current facility-administered medications for this visit.        Past Medical History:   Diagnosis Date    Excessive or frequent menstruation 4/23/13    Hx of abnormal Pap smear 12/16/2013    ASCUS +HPV    IUD (intrauterine device) in place 10/24/14 mirena resulted in perforation--- Mirena inserted 11/2017    Mirena placed    Need for prophylactic vaccination and inoculation against other viral diseases(V04.89)     Gardasil injections completed all 3 previously.  Ruptured ovarian cyst        Past Surgical History:   Procedure Laterality Date    HX  SECTION  14    HX DILATION AND CURETTAGE  10/2016       Family History   Problem Relation Age of Onset    Hypertension Father     Hypertension Mother        Social History     Socioeconomic History    Marital status: SINGLE     Spouse name: Not on file    Number of children: Not on file    Years of education: Not on file    Highest education level: Not on file   Occupational History    Not on file   Tobacco Use    Smoking status: Never Smoker    Smokeless tobacco: Never Used   Substance and Sexual Activity    Alcohol use: No    Drug use: No    Sexual activity: Yes     Partners: Male     Birth control/protection: I.U.D. Other Topics Concern    Not on file   Social History Narrative    Not on file     Social Determinants of Health     Financial Resource Strain:     Difficulty of Paying Living Expenses:    Food Insecurity:     Worried About Running Out of Food in the Last Year:     [address] in the Last Year:    Transportation Needs:     Lack of Transportation (Medical):  Lack of Transportation (Non-Medical):    Physical Activity:     Days of Exercise per Week:     Minutes of Exercise per Session:    Stress:     Feeling of Stress :    Social Connections:     Frequency of Communication with Friends and Family:     Frequency of Social Gatherings with Friends and Family:     Attends Nondenominational Services:     Active Member of Clubs or Organizations:     Attends Club or Organization Meetings:     Marital Status:    Intimate Partner Violence:     Fear of Current or Ex-Partner:     Emotionally Abused:     Physically Abused:     Sexually Abused:          ROS   Review of Systems   Constitutional: Negative. HENT: Negative. Eyes: Negative. Respiratory: Negative. Cardiovascular: Negative. Gastrointestinal: Negative. Genitourinary: Negative. Musculoskeletal: Negative. Skin: Negative. Neurological: Negative. Endo/Heme/Allergies: Negative. Psychiatric/Behavioral: Negative. /79   Ht 5' 4\" (1.626 m)   Wt 205 lb 2 oz (93 kg)   LMP 09/20/2021 (Exact Date)   BMI 35.21 kg/m²    OBGyn Exam   Constitutional  · Appearance: well-nourished, well developed, alert, in no acute distress    HENT  · Head and Face: appears normal    Chest  · Respiratory Effort: breathing labored    Gastrointestinal  · Abdominal Examination: abdomen non-tender to palpation, normal bowel sounds, no masses present    Genitourinary  Deferred    Skin  · General Inspection: no rash, no lesions identified    Neurologic/Psychiatric  · Mental Status:  · Orientation: grossly oriented to person, place and time  · Mood and Affect: mood normal, affect appropriate    No results found for this visit on 10/11/21. Orders Placed This Encounter    norgestimate-ethinyl estradioL (Sprintec, 28,) 0.25-35 mg-mcg tab     Sig: Take 1 Tablet by mouth daily for 63 days. Take continuously, only active pills     Dispense:  63 Each     Refill:  1    spironolactone (ALDACTONE) 100 mg tablet     Sig: Take 1 Tablet by mouth daily. Dispense:  30 Tablet     Refill:  5    glyBURIDE (DIABETA) 2.5 mg tablet     Sig: Take 1 Tablet by mouth two (2) times daily (with meals). Dispense:  60 Tablet     Refill:  3     30 yo PCOS FOLLOW UP  CAN NOT TOLERATE METFORMIN  DISCUSSED FINDINGS, ALTERNATIVE MEDS  AGREED TO START GLYBURIDE 2.5 BID    ADDRESSED DYSMENORRHEA AND VAG BLEEDING  - START CONBT SPRINTEC  - MENSTRUAL CALENDAR  RTC IN 3 MONTHS    1. PCOS (polycystic ovarian syndrome)    - norgestimate-ethinyl estradioL (Sprintec, 28,) 0.25-35 mg-mcg tab; Take 1 Tablet by mouth daily for 63 days. Take continuously, only active pills  Dispense: 63 Each; Refill: 1  - spironolactone (ALDACTONE) 100 mg tablet;  Take 1 Tablet by mouth daily.  Dispense: 30 Tablet; Refill: 5  - glyBURIDE (DIABETA) 2.5 mg tablet; Take 1 Tablet by mouth two (2) times daily (with meals). Dispense: 60 Tablet;  Refill: 3      30 MIN

## 2021-11-02 ENCOUNTER — TELEPHONE (OUTPATIENT)
Dept: OBGYN CLINIC | Age: 26
End: 2021-11-02

## 2021-11-02 NOTE — TELEPHONE ENCOUNTER
Patient called stating she was switched from Metformin to Glyburide on 10/11/21. States she has noticed she gets shaky, jittery and her heart races after taking her morning dose and it isn't getting any better. She states she thinks her blood sugars are dropping too low. Advised her Dr Veronique Fox is currently seeing patient's but her message would be forwarded to him.

## 2021-11-05 NOTE — TELEPHONE ENCOUNTER
Spoke with the patient and advised her per Dr Toya Ruiz she may break the medication in half and make sure she eats when taking it.

## 2021-12-20 RX ORDER — CETIRIZINE HCL 10 MG
10 TABLET ORAL DAILY
Qty: 30 TABLET | Refills: 5 | Status: SHIPPED | OUTPATIENT
Start: 2021-12-20 | End: 2022-10-07

## 2021-12-20 RX ORDER — ONDANSETRON 8 MG/1
8 TABLET, ORALLY DISINTEGRATING ORAL
Qty: 20 TABLET | Refills: 0 | OUTPATIENT
Start: 2021-12-20 | End: 2022-08-13

## 2022-01-12 ENCOUNTER — OFFICE VISIT (OUTPATIENT)
Dept: OBGYN CLINIC | Age: 27
End: 2022-01-12
Payer: COMMERCIAL

## 2022-01-12 VITALS — DIASTOLIC BLOOD PRESSURE: 76 MMHG | WEIGHT: 211 LBS | BODY MASS INDEX: 36.22 KG/M2 | SYSTOLIC BLOOD PRESSURE: 125 MMHG

## 2022-01-12 DIAGNOSIS — E28.2 PCOS (POLYCYSTIC OVARIAN SYNDROME): Primary | ICD-10-CM

## 2022-01-12 PROCEDURE — 99213 OFFICE O/P EST LOW 20 MIN: CPT | Performed by: OBSTETRICS & GYNECOLOGY

## 2022-01-12 RX ORDER — METFORMIN HYDROCHLORIDE 500 MG/1
500 TABLET ORAL 2 TIMES DAILY WITH MEALS
Qty: 120 TABLET | Refills: 2 | OUTPATIENT
Start: 2022-01-12 | End: 2022-08-13

## 2022-01-12 NOTE — PROGRESS NOTES
Emigdio Marie is a 31 y.o. female who presents today for the following:  Chief Complaint   Patient presents with    Follow-up     pt. was started on glyburide in october. pt. stopped meds due to feeling hot and feeling faint. Allergies   Allergen Reactions    Latex Rash    Hydrocodone Nausea and Vomiting       Current Outpatient Medications   Medication Sig    metFORMIN (GLUCOPHAGE) 500 mg tablet Take 1 Tablet by mouth two (2) times daily (with meals).  ondansetron (ZOFRAN ODT) 8 mg disintegrating tablet Take 1 Tablet by mouth every eight (8) hours as needed for Nausea or Vomiting.  cetirizine (ZYRTEC) 10 mg tablet Take 1 Tablet by mouth daily.  spironolactone (ALDACTONE) 100 mg tablet Take 1 Tablet by mouth daily.  glyBURIDE (DIABETA) 2.5 mg tablet Take 1 Tablet by mouth two (2) times daily (with meals). (Patient not taking: Reported on 1/12/2022)    spironolactone (ALDACTONE) 100 mg tablet Take 1 Tablet by mouth daily.  ALPRAZolam (XANAX) 1 mg tablet TAKE 1 TABLET BY MOUTH DAILY AS NEEDED FOR ANXIETY    dextroamphetamine-amphetamine (ADDERALL) 30 mg tablet Take 1 Tab by mouth daily. Max Daily Amount: 1 Tab. Indications: attention deficit disorder with hyperactivity    dextroamphetamine-amphetamine (ADDERALL) 30 mg tablet Take 1 Tab by mouth daily. Max Daily Amount: 1 Tab.  dextroamphetamine-amphetamine (ADDERALL) 30 mg tablet Take 1 Tab by mouth daily. Max Daily Amount: 1 Tab. No current facility-administered medications for this visit. Past Medical History:   Diagnosis Date    Excessive or frequent menstruation 4/23/13    Hx of abnormal Pap smear 12/16/2013    ASCUS +HPV    IUD (intrauterine device) in place 10/24/14 mirena resulted in perforation--- Mirena inserted 11/2017    Mirena placed    Need for prophylactic vaccination and inoculation against other viral diseases(V04.89)     Gardasil injections completed all 3 previously.     Ruptured ovarian cyst        Past Surgical History:   Procedure Laterality Date    HX  SECTION  14    HX DILATION AND CURETTAGE  10/2016       Family History   Problem Relation Age of Onset    Hypertension Father     Hypertension Mother        Social History     Socioeconomic History    Marital status: SINGLE     Spouse name: Not on file    Number of children: Not on file    Years of education: Not on file    Highest education level: Not on file   Occupational History    Not on file   Tobacco Use    Smoking status: Never Smoker    Smokeless tobacco: Never Used   Substance and Sexual Activity    Alcohol use: No    Drug use: No    Sexual activity: Yes     Partners: Male     Birth control/protection: I.U.D. Other Topics Concern    Not on file   Social History Narrative    Not on file     Social Determinants of Health     Financial Resource Strain:     Difficulty of Paying Living Expenses: Not on file   Food Insecurity:     Worried About Running Out of Food in the Last Year: Not on file    Doyle of Food in the Last Year: Not on file   Transportation Needs:     Lack of Transportation (Medical): Not on file    Lack of Transportation (Non-Medical):  Not on file   Physical Activity:     Days of Exercise per Week: Not on file    Minutes of Exercise per Session: Not on file   Stress:     Feeling of Stress : Not on file   Social Connections:     Frequency of Communication with Friends and Family: Not on file    Frequency of Social Gatherings with Friends and Family: Not on file    Attends Orthodox Services: Not on file    Active Member of Clubs or Organizations: Not on file    Attends Club or Organization Meetings: Not on file    Marital Status: Not on file   Intimate Partner Violence:     Fear of Current or Ex-Partner: Not on file    Emotionally Abused: Not on file    Physically Abused: Not on file    Sexually Abused: Not on file   Housing Stability:     Unable to Pay for Housing in the Last Year: Not on file    Number of Places Lived in the Last Year: Not on file    Unstable Housing in the Last Year: Not on file         ROS   Review of Systems   Constitutional: Negative. HENT: Negative. Eyes: Negative. Respiratory: Negative. Cardiovascular: Negative. Gastrointestinal: Negative. Genitourinary: Negative. Musculoskeletal: Negative. Skin: Negative. Neurological: Negative. Endo/Heme/Allergies: Negative. Psychiatric/Behavioral: Negative. /76 (BP 1 Location: Left upper arm, BP Patient Position: Sitting, BP Cuff Size: Large adult)   Wt 211 lb (95.7 kg)   BMI 36.22 kg/m²    OBGyn Exam   Constitutional  · Appearance: well-nourished, well developed, alert, in no acute distress    HENT  · Head and Face: appears normal    Chest  · Respiratory Effort: breathing labored    Gastrointestinal  · Abdominal Examination: abdomen non-tender to palpation, normal bowel sounds, no masses present    Genitourinary  DEFERRED    Skin  · General Inspection: no rash, no lesions identified    Neurologic/Psychiatric  · Mental Status:  · Orientation: grossly oriented to person, place and time  · Mood and Affect: mood normal, affect appropriate    No results found for this visit on 01/12/22. Orders Placed This Encounter    metFORMIN (GLUCOPHAGE) 500 mg tablet     Sig: Take 1 Tablet by mouth two (2) times daily (with meals). Dispense:  120 Tablet     Refill:  2     30 YO PCOS  STOP TAKING METFORMIN, WANTS TO RESTART  HAD LASER HAIR REMOVAL, WILL STOP SPIRONOLACTONE    1. PCOS (polycystic ovarian syndrome)    - metFORMIN (GLUCOPHAGE) 500 mg tablet; Take 1 Tablet by mouth two (2) times daily (with meals). Dispense: 120 Tablet;  Refill: 2

## 2022-01-21 DIAGNOSIS — F41.1 GAD (GENERALIZED ANXIETY DISORDER): ICD-10-CM

## 2022-01-21 RX ORDER — ALPRAZOLAM 1 MG/1
TABLET ORAL
Qty: 30 TABLET | Refills: 1 | OUTPATIENT
Start: 2022-01-21

## 2022-01-31 ENCOUNTER — TELEPHONE (OUTPATIENT)
Dept: FAMILY MEDICINE CLINIC | Age: 27
End: 2022-01-31

## 2022-01-31 ENCOUNTER — OFFICE VISIT (OUTPATIENT)
Dept: FAMILY MEDICINE CLINIC | Age: 27
End: 2022-01-31
Payer: COMMERCIAL

## 2022-01-31 VITALS
HEART RATE: 101 BPM | TEMPERATURE: 98.9 F | WEIGHT: 214 LBS | OXYGEN SATURATION: 98 % | BODY MASS INDEX: 36.54 KG/M2 | DIASTOLIC BLOOD PRESSURE: 77 MMHG | SYSTOLIC BLOOD PRESSURE: 122 MMHG | HEIGHT: 64 IN | RESPIRATION RATE: 18 BRPM

## 2022-01-31 DIAGNOSIS — F41.1 GAD (GENERALIZED ANXIETY DISORDER): ICD-10-CM

## 2022-01-31 DIAGNOSIS — F90.2 ATTENTION DEFICIT HYPERACTIVITY DISORDER (ADHD), COMBINED TYPE: ICD-10-CM

## 2022-01-31 PROCEDURE — 99214 OFFICE O/P EST MOD 30 MIN: CPT | Performed by: NURSE PRACTITIONER

## 2022-01-31 RX ORDER — DEXTROAMPHETAMINE SACCHARATE, AMPHETAMINE ASPARTATE, DEXTROAMPHETAMINE SULFATE AND AMPHETAMINE SULFATE 7.5; 7.5; 7.5; 7.5 MG/1; MG/1; MG/1; MG/1
30 TABLET ORAL DAILY
Qty: 30 TABLET | Refills: 0 | Status: SHIPPED | OUTPATIENT
Start: 2022-01-31 | End: 2022-02-01 | Stop reason: SDUPTHER

## 2022-01-31 RX ORDER — ALPRAZOLAM 1 MG/1
TABLET ORAL
Qty: 30 TABLET | Refills: 1 | Status: SHIPPED | OUTPATIENT
Start: 2022-01-31 | End: 2022-02-01 | Stop reason: SDUPTHER

## 2022-01-31 RX ORDER — DULOXETIN HYDROCHLORIDE 60 MG/1
60 CAPSULE, DELAYED RELEASE ORAL DAILY
Qty: 30 CAPSULE | Refills: 5 | Status: SHIPPED | OUTPATIENT
Start: 2022-01-31 | End: 2022-02-01 | Stop reason: SDUPTHER

## 2022-01-31 NOTE — PROGRESS NOTES
Chief Complaint   Patient presents with    Medication Refill     Pt being seen for med refill    1. Have you been to the ER, urgent care clinic since your last visit? Hospitalized since your last visit? No    2. Have you seen or consulted any other health care providers outside of the [address] [place] [address] since your last visit? Include any pap smears or colon screening.  No     Pt has no other concerns

## 2022-01-31 NOTE — TELEPHONE ENCOUNTER
----- Message from Malu Sun sent at 1/31/2022  4:58 PM EST -----  Subject: Medication Problem    QUESTIONS  Name of Medication? DULoxetine (CYMBALTA) 60 mg capsule  Patient-reported dosage and instructions? Take 1 Capsule by mouth daily. What question or problem do you have with the medication? as well as,   Adderall, and Xanax were sent to the wrong pharmacy. please have   medications sent to [facility]  Preferred Pharmacy? [facility]ACY #[id number]- [address] phone number (if available)? [phone]  Additional Information for Provider?   ---------------------------------------------------------------------------  --------------  [address]  What is the best way for the office to contact you? OK to leave message on   voicemail  Preferred Call Back Phone Number? [phone]  ---------------------------------------------------------------------------  --------------  SCRIPT ANSWERS  Relationship to Patient?  Self

## 2022-01-31 NOTE — TELEPHONE ENCOUNTER
Please clarify: she had me change the pharmacy on purpose to [facility] while in the exam room because [facility] was being difficult.  Ketty Mejias

## 2022-02-01 ENCOUNTER — TELEPHONE (OUTPATIENT)
Dept: FAMILY MEDICINE CLINIC | Age: 27
End: 2022-02-01

## 2022-02-01 DIAGNOSIS — F90.2 ATTENTION DEFICIT HYPERACTIVITY DISORDER (ADHD), COMBINED TYPE: ICD-10-CM

## 2022-02-01 DIAGNOSIS — F41.1 GAD (GENERALIZED ANXIETY DISORDER): ICD-10-CM

## 2022-02-01 RX ORDER — DEXTROAMPHETAMINE SACCHARATE, AMPHETAMINE ASPARTATE, DEXTROAMPHETAMINE SULFATE AND AMPHETAMINE SULFATE 7.5; 7.5; 7.5; 7.5 MG/1; MG/1; MG/1; MG/1
30 TABLET ORAL DAILY
Qty: 30 TABLET | Refills: 0 | Status: SHIPPED | OUTPATIENT
Start: 2022-02-01 | End: 2022-03-03

## 2022-02-01 RX ORDER — ALPRAZOLAM 1 MG/1
TABLET ORAL
Qty: 30 TABLET | Refills: 1 | Status: SHIPPED | OUTPATIENT
Start: 2022-02-01

## 2022-02-01 RX ORDER — DULOXETIN HYDROCHLORIDE 60 MG/1
60 CAPSULE, DELAYED RELEASE ORAL DAILY
Qty: 30 CAPSULE | Refills: 5 | Status: SHIPPED | OUTPATIENT
Start: 2022-02-01 | End: 2022-02-09 | Stop reason: ALTCHOICE

## 2022-02-01 NOTE — TELEPHONE ENCOUNTER
Spoke with pt, she states that the [facility] is out of her network and needs [facility].  She states that since controlled she had to call the office

## 2022-02-01 NOTE — PROGRESS NOTES
HISTORY OF PRESENT ILLNESS  Evgeny Sesay is a 31 y.o. female. HPI  Patient returns to office for follow up ADD. Stable on medication without weight loss, decreased appetite, insomnia, or tremor. Good focus control. Gets three months of scripts at a time. See med order for dose. She does not feel her meds are helping with anxiety  Sx are severe for panic and worry  Does not want a med that is going to cause weight gain  Needs refill of her xanax as well    ROS  A comprehensive review of system was obtained and negative except findings in the HPI    Visit Vitals  /77 (BP 1 Location: Left upper arm, BP Patient Position: Sitting)   Pulse (!) 101   Temp 98.9 °F (37.2 °C) (Oral)   Resp 18   Ht 5' 4\" (1.626 m)   Wt 214 lb (97.1 kg)   LMP 01/01/2022   SpO2 98%   BMI 36.73 kg/m²     Physical Exam  Vitals and nursing note reviewed. Constitutional:       Appearance: She is well-developed. Comments:      Neck:      Vascular: No JVD. Cardiovascular:      Rate and Rhythm: Normal rate and regular rhythm. Heart sounds: No murmur heard. No friction rub. No gallop. Pulmonary:      Effort: Pulmonary effort is normal. No respiratory distress. Breath sounds: Normal breath sounds. No wheezing. Skin:     General: Skin is warm. Neurological:      Mental Status: She is alert and oriented to person, place, and time. ASSESSMENT and PLAN  Encounter Diagnoses   Name Primary?     Attention deficit hyperactivity disorder (ADHD), combined type     ROSALIO (generalized anxiety disorder)      Orders Placed This Encounter    dextroamphetamine-amphetamine (ADDERALL) 30 mg tablet    dextroamphetamine-amphetamine (ADDERALL) 30 mg tablet    dextroamphetamine-amphetamine (ADDERALL) 30 mg tablet    DULoxetine (CYMBALTA) 60 mg capsule    ALPRAZolam (XANAX) 1 mg tablet     Refills updated  Change to cymbalta and reviewed how to start med and ramp up  Refilled xanax 1mg prn  Recheck 3 weeks    I have discussed the diagnosis with the patient and the intended plan as seen in the above orders. The patient has received an after-visit summary and questions were answered concerning future plans. Patient conveyed understanding of the plan at the time of the visit.     ANGELIKA Vang, ANP  1/31/2022

## 2022-02-02 ENCOUNTER — TELEPHONE (OUTPATIENT)
Dept: FAMILY MEDICINE CLINIC | Age: 27
End: 2022-02-02

## 2022-02-02 NOTE — TELEPHONE ENCOUNTER
Spoke with pt- needs a UDS for ADHD PA has a medicaid HMO- will call back after get her work schedule to come to do UDS.

## 2022-02-07 ENCOUNTER — DOCUMENTATION ONLY (OUTPATIENT)
Dept: FAMILY MEDICINE CLINIC | Age: 27
End: 2022-02-07

## 2022-02-07 ENCOUNTER — OFFICE VISIT (OUTPATIENT)
Dept: FAMILY MEDICINE CLINIC | Age: 27
End: 2022-02-07
Payer: COMMERCIAL

## 2022-02-07 DIAGNOSIS — Z79.899 MEDICATION MANAGEMENT: Primary | ICD-10-CM

## 2022-02-07 LAB
AMPHETAMINE QL URINE POC: NEGATIVE
BARBITURATES UR POC: NEGATIVE
BENZODIAZEPINES UR POC: NEGATIVE
COCAINE QL URINE POC: NEGATIVE
LOT EXP DATE POC: NORMAL
LOT NUMBER POC: NORMAL
MARIJUANA (THC) QL URINE POC: NEGATIVE
MDMA/ECSTASY UR POC: NEGATIVE
METHADONE QL URINE POC: NEGATIVE
METHAMPHETAMINE QL URINE POC: NEGATIVE
NTI OTHER MICRO TRANSPORT 977598: NEGATIVE
OPIATES QL URINE POC: NEGATIVE
OXYCODONE UR POC: NEGATIVE
VALID INTERNAL CONTROL?: YES

## 2022-02-07 PROCEDURE — 80305 DRUG TEST PRSMV DIR OPT OBS: CPT | Performed by: NURSE PRACTITIONER

## 2022-02-07 NOTE — PROGRESS NOTES
PA for Adderall 30 mg submitted to [facility] [employer] medicaid via fax, awaiting response.

## 2022-02-09 RX ORDER — ESCITALOPRAM OXALATE 10 MG/1
10 TABLET ORAL DAILY
Qty: 30 TABLET | Refills: 5 | Status: SHIPPED | OUTPATIENT
Start: 2022-02-09 | End: 2022-03-22

## 2022-02-10 ENCOUNTER — DOCUMENTATION ONLY (OUTPATIENT)
Dept: FAMILY MEDICINE CLINIC | Age: 27
End: 2022-02-10

## 2022-02-10 NOTE — PROGRESS NOTES
PA for Adderall 30 mg approved from 2/8/22 - 2/8/23, copy faxed to pharmacy & Copy placed in scan folder to be scanned to chart.

## 2022-02-17 ENCOUNTER — DOCUMENTATION ONLY (OUTPATIENT)
Dept: FAMILY MEDICINE CLINIC | Age: 27
End: 2022-02-17

## 2022-03-19 PROBLEM — E66.01 MORBID OBESITY (HCC): Status: ACTIVE | Noted: 2018-01-03

## 2022-03-19 PROBLEM — F90.2 ATTENTION DEFICIT HYPERACTIVITY DISORDER (ADHD), COMBINED TYPE: Status: ACTIVE | Noted: 2018-03-01

## 2022-03-22 RX ORDER — ESCITALOPRAM OXALATE 20 MG/1
20 TABLET ORAL DAILY
Qty: 90 TABLET | Refills: 1 | OUTPATIENT
Start: 2022-03-22 | End: 2022-08-13

## 2022-07-21 ENCOUNTER — TELEPHONE (OUTPATIENT)
Dept: FAMILY MEDICINE CLINIC | Age: 27
End: 2022-07-21

## 2022-07-21 RX ORDER — FLUCONAZOLE 150 MG/1
150 TABLET ORAL DAILY
Qty: 1 TABLET | Refills: 5 | Status: SHIPPED | OUTPATIENT
Start: 2022-07-21 | End: 2022-07-22

## 2022-07-21 RX ORDER — AZITHROMYCIN 250 MG/1
TABLET, FILM COATED ORAL
Qty: 6 TABLET | Refills: 0 | OUTPATIENT
Start: 2022-07-21 | End: 2022-08-13

## 2022-07-21 NOTE — TELEPHONE ENCOUNTER
----- Message from Ken Ndiaye sent at 7/21/2022 10:08 AM EDT -----  Subject: Appointment Request    Reason for Call: Established Patient Appointment needed: Semi-Routine   Cough, Cold Symptoms    QUESTIONS    Reason for appointment request? No appointments available during search     Additional Information for Provider? patient experiencing congestion, sore   throat, and pink eye with no fever. no appts available.  please advise   ---------------------------------------------------------------------------  --------------  Celi MICHELLE  [phone]; OK to leave message on voicemail  ---------------------------------------------------------------------------  --------------  SCRIPT ANSWERS  LEONIDID Screen: Birder Fothergill

## 2022-08-04 DIAGNOSIS — E28.2 PCOS (POLYCYSTIC OVARIAN SYNDROME): Primary | ICD-10-CM

## 2022-08-04 RX ORDER — CEFDINIR 300 MG/1
300 CAPSULE ORAL 2 TIMES DAILY
Qty: 20 CAPSULE | Refills: 0 | OUTPATIENT
Start: 2022-08-04 | End: 2022-08-13

## 2022-08-04 RX ORDER — PREDNISONE 10 MG/1
TABLET ORAL
Qty: 1 DOSE PACK | Refills: 0 | Status: SHIPPED | OUTPATIENT
Start: 2022-08-04 | End: 2022-10-07

## 2022-08-13 ENCOUNTER — APPOINTMENT (OUTPATIENT)
Dept: CT IMAGING | Age: 27
End: 2022-08-13
Attending: EMERGENCY MEDICINE
Payer: COMMERCIAL

## 2022-08-13 ENCOUNTER — HOSPITAL ENCOUNTER (EMERGENCY)
Age: 27
Discharge: HOME | End: 2022-08-13
Attending: EMERGENCY MEDICINE
Payer: COMMERCIAL

## 2022-08-13 VITALS
BODY MASS INDEX: 33.29 KG/M2 | WEIGHT: 195 LBS | HEART RATE: 84 BPM | OXYGEN SATURATION: 100 % | DIASTOLIC BLOOD PRESSURE: 87 MMHG | RESPIRATION RATE: 16 BRPM | HEIGHT: 64 IN | TEMPERATURE: 97.3 F | SYSTOLIC BLOOD PRESSURE: 132 MMHG

## 2022-08-13 DIAGNOSIS — J02.9 PHARYNGITIS, UNSPECIFIED ETIOLOGY: Primary | ICD-10-CM

## 2022-08-13 DIAGNOSIS — K12.30: ICD-10-CM

## 2022-08-13 LAB
BASE EXCESS BLD CALC-SCNC: 1.1 MMOL/L
CA-I BLD-MCNC: 1.31 MMOL/L (ref 1.12–1.32)
CHLORIDE BLD-SCNC: 107 MMOL/L (ref 100–108)
CO2 BLD-SCNC: 29 MMOL/L (ref 19–24)
CREAT UR-MCNC: 0.9 MG/DL (ref 0.6–1.3)
GLUCOSE BLD STRIP.AUTO-MCNC: 70 MG/DL (ref 74–106)
HCO3 BLDA-SCNC: 28 MMOL/L
PCO2 BLDV: 52.6 MMHG (ref 41–51)
PH BLDV: 7.34 [PH] (ref 7.32–7.42)
PO2 BLDV: 21 MMHG (ref 25–40)
POTASSIUM BLD-SCNC: 3.6 MMOL/L (ref 3.5–5.5)
SODIUM BLD-SCNC: 144 MMOL/L (ref 136–145)
SPECIMEN SITE: ABNORMAL

## 2022-08-13 PROCEDURE — 74011000250 HC RX REV CODE- 250: Performed by: EMERGENCY MEDICINE

## 2022-08-13 PROCEDURE — 96374 THER/PROPH/DIAG INJ IV PUSH: CPT

## 2022-08-13 PROCEDURE — 99285 EMERGENCY DEPT VISIT HI MDM: CPT

## 2022-08-13 PROCEDURE — 70491 CT SOFT TISSUE NECK W/DYE: CPT

## 2022-08-13 PROCEDURE — 82947 ASSAY GLUCOSE BLOOD QUANT: CPT

## 2022-08-13 PROCEDURE — 74011000636 HC RX REV CODE- 636: Performed by: EMERGENCY MEDICINE

## 2022-08-13 PROCEDURE — 74011250636 HC RX REV CODE- 250/636: Performed by: EMERGENCY MEDICINE

## 2022-08-13 PROCEDURE — 74011250637 HC RX REV CODE- 250/637: Performed by: EMERGENCY MEDICINE

## 2022-08-13 RX ORDER — DEXAMETHASONE SODIUM PHOSPHATE 10 MG/ML
10 INJECTION INTRAMUSCULAR; INTRAVENOUS
Status: DISCONTINUED | OUTPATIENT
Start: 2022-08-13 | End: 2022-08-13

## 2022-08-13 RX ORDER — DEXAMETHASONE 2 MG/1
10 TABLET ORAL ONCE
Qty: 5 TABLET | Refills: 0 | Status: SHIPPED | OUTPATIENT
Start: 2022-08-15 | End: 2022-08-15

## 2022-08-13 RX ORDER — LIDOCAINE HYDROCHLORIDE 20 MG/ML
3.33 SOLUTION OROPHARYNGEAL
Status: COMPLETED | OUTPATIENT
Start: 2022-08-13 | End: 2022-08-13

## 2022-08-13 RX ORDER — DEXAMETHASONE SODIUM PHOSPHATE 10 MG/ML
10 INJECTION INTRAMUSCULAR; INTRAVENOUS
Status: COMPLETED | OUTPATIENT
Start: 2022-08-13 | End: 2022-08-13

## 2022-08-13 RX ORDER — DIPHENHYDRAMINE HCL 12.5MG/5ML
3.33 ELIXIR ORAL ONCE
Status: COMPLETED | OUTPATIENT
Start: 2022-08-13 | End: 2022-08-13

## 2022-08-13 RX ORDER — IBUPROFEN 800 MG/1
800 TABLET ORAL
Qty: 20 TABLET | Refills: 0 | Status: SHIPPED | OUTPATIENT
Start: 2022-08-13 | End: 2022-08-20

## 2022-08-13 RX ORDER — ACETAMINOPHEN 500 MG
1000 TABLET ORAL
Qty: 20 TABLET | Refills: 0 | Status: SHIPPED | OUTPATIENT
Start: 2022-08-13 | End: 2022-10-07

## 2022-08-13 RX ADMIN — SODIUM CHLORIDE 1000 ML: 9 INJECTION, SOLUTION INTRAVENOUS at 20:40

## 2022-08-13 RX ADMIN — ALUMINUM HYDROXIDE AND MAGNESIUM HYDROXIDE 3.33 ML: 200; 200 SUSPENSION ORAL at 20:40

## 2022-08-13 RX ADMIN — LIDOCAINE HYDROCHLORIDE 3.33 ML: 20 SOLUTION ORAL at 20:40

## 2022-08-13 RX ADMIN — IOPAMIDOL 100 ML: 612 INJECTION, SOLUTION INTRAVENOUS at 20:56

## 2022-08-13 RX ADMIN — DIPHENHYDRAMINE HYDROCHLORIDE 8.25 MG: 12.5 SOLUTION ORAL at 20:41

## 2022-08-13 RX ADMIN — DEXAMETHASONE SODIUM PHOSPHATE 10 MG: 10 INJECTION, SOLUTION INTRAMUSCULAR; INTRAVENOUS at 20:40

## 2022-08-13 NOTE — ED TRIAGE NOTES
Pt states she has had a sore throat since July 19th. Pt reports taking antibiotics and steroids with no improvement. Reports sweating and chills. Ambulatory to ED. No relevant Pmhx.

## 2022-08-15 NOTE — ED PROVIDER NOTES
The history is provided by the patient. Sore Throat   This is a chronic problem. Episode onset: 3 weeks ago. There has been no fever. Pertinent negatives include no congestion, no headaches, no shortness of breath, no stridor, no trouble swallowing and no cough. She has tried NSAIDs and acetaminophen for the symptoms. The treatment provided no relief. Past Medical History:   Diagnosis Date    Excessive or frequent menstruation 12    Hx of abnormal Pap smear 2013    ASCUS +HPV    IUD (intrauterine device) in place 10/24/14 mirena resulted in perforation--- Mirena inserted 2017    Mirena placed    Need for prophylactic vaccination and inoculation against other viral diseases(V04.89)     Gardasil injections completed all 3 previously. Ruptured ovarian cyst        Past Surgical History:   Procedure Laterality Date    HX  SECTION  14    HX DILATION AND CURETTAGE  10/2016         Family History:   Problem Relation Age of Onset    Hypertension Father     Hypertension Mother        Social History     Socioeconomic History    Marital status: SINGLE     Spouse name: Not on file    Number of children: Not on file    Years of education: Not on file    Highest education level: Not on file   Occupational History    Not on file   Tobacco Use    Smoking status: Never    Smokeless tobacco: Never   Substance and Sexual Activity    Alcohol use: No    Drug use: No    Sexual activity: Yes     Partners: Male     Birth control/protection: I.U.D.    Other Topics Concern    Not on file   Social History Narrative    Not on file     Social Determinants of Health     Financial Resource Strain: Not on file   Food Insecurity: Not on file   Transportation Needs: Not on file   Physical Activity: Not on file   Stress: Not on file   Social Connections: Not on file   Intimate Partner Violence: Not on file   Housing Stability: Not on file         ALLERGIES: Latex and Hydrocodone    Review of Systems   HENT: Positive for sore throat. Negative for congestion and trouble swallowing. Respiratory:  Negative for cough, shortness of breath and stridor. Neurological:  Negative for headaches. All other systems reviewed and are negative. Vitals:    08/13/22 1838   BP: 132/87   Pulse: 84   Resp: 16   Temp: 97.3 °F (36.3 °C)   SpO2: 100%   Weight: 88.5 kg (195 lb)   Height: 5' 4\" (1.626 m)            Physical Exam  Vitals and nursing note reviewed. Constitutional:       General: She is not in acute distress. Appearance: She is well-developed. HENT:      Head: Normocephalic and atraumatic. Comments: Normal phonation     Mouth/Throat:      Mouth: Mucous membranes are moist.      Pharynx: Uvula midline. Posterior oropharyngeal erythema present. No pharyngeal swelling, oropharyngeal exudate or uvula swelling. Eyes:      Conjunctiva/sclera: Conjunctivae normal.   Cardiovascular:      Rate and Rhythm: Normal rate and regular rhythm. Heart sounds: Normal heart sounds. Pulmonary:      Effort: Pulmonary effort is normal. No respiratory distress. Breath sounds: Normal breath sounds. Abdominal:      General: There is no distension. Musculoskeletal:         General: No deformity. Normal range of motion. Cervical back: Neck supple. Skin:     General: Skin is warm and dry. Neurological:      Mental Status: She is alert. Cranial Nerves: No cranial nerve deficit. Psychiatric:         Behavior: Behavior normal.        MDM       39-year-old female presents with sore throat that has been ongoing for 3 weeks and did not improve with antibiotic therapy, steroids or supportive care. The pain is asymmetric with worse pain on the left so CT was ordered to rule out peritonsillar abscess. Imaging shows right tonsillar enlargement without abscess or deep space infection.   We will trial her on Decadron, no evidence of bacterial infection and she had previously been swabbed negative for strep and COVID.   Discussed topical supportive care measures to help with pain in the interim and follow-up with ENT as needed if symptoms persist.    Procedures

## 2022-09-14 RX ORDER — AZITHROMYCIN 250 MG/1
TABLET, FILM COATED ORAL
Qty: 6 TABLET | Refills: 0 | Status: SHIPPED | OUTPATIENT
Start: 2022-09-14 | End: 2022-10-07

## 2022-09-14 RX ORDER — CETIRIZINE HCL 10 MG
10 TABLET ORAL DAILY
Qty: 30 TABLET | Refills: 5 | Status: SHIPPED | OUTPATIENT
Start: 2022-09-14

## 2022-10-07 ENCOUNTER — OFFICE VISIT (OUTPATIENT)
Dept: OBGYN CLINIC | Age: 27
End: 2022-10-07
Payer: COMMERCIAL

## 2022-10-07 VITALS
SYSTOLIC BLOOD PRESSURE: 150 MMHG | BODY MASS INDEX: 36.41 KG/M2 | HEIGHT: 64 IN | DIASTOLIC BLOOD PRESSURE: 90 MMHG | WEIGHT: 213.25 LBS

## 2022-10-07 DIAGNOSIS — E28.2 PCOS (POLYCYSTIC OVARIAN SYNDROME): ICD-10-CM

## 2022-10-07 DIAGNOSIS — Z11.51 SCREENING FOR HUMAN PAPILLOMAVIRUS: ICD-10-CM

## 2022-10-07 DIAGNOSIS — Z01.419 PAP SMEAR, AS PART OF ROUTINE GYNECOLOGICAL EXAMINATION: Primary | ICD-10-CM

## 2022-10-07 PROCEDURE — 99395 PREV VISIT EST AGE 18-39: CPT | Performed by: OBSTETRICS & GYNECOLOGY

## 2022-10-07 RX ORDER — METFORMIN HYDROCHLORIDE 500 MG/1
500 TABLET ORAL 2 TIMES DAILY WITH MEALS
Qty: 120 TABLET | Refills: 5 | Status: SHIPPED | OUTPATIENT
Start: 2022-10-07

## 2022-10-07 RX ORDER — NORGESTIMATE AND ETHINYL ESTRADIOL 0.25-0.035
1 KIT ORAL DAILY
Qty: 1 DOSE PACK | Refills: 11 | Status: SHIPPED | OUTPATIENT
Start: 2022-10-07 | End: 2022-11-06

## 2022-10-07 RX ORDER — SPIRONOLACTONE 100 MG/1
100 TABLET, FILM COATED ORAL DAILY
Qty: 30 TABLET | Refills: 5 | Status: SHIPPED | OUTPATIENT
Start: 2022-10-07

## 2022-10-07 NOTE — PROGRESS NOTES
Becky Stacy is a 28 y.o. female who presents today for the following:  Chief Complaint   Patient presents with    Annual Exam     Pt presents for annual exam and to discuss re-starting PCOS treatment. Pt states cycles have become irregular again and weight gain//MKeeley     PCOS        Allergies   Allergen Reactions    Latex Rash    Hydrocodone Nausea and Vomiting       Current Outpatient Medications   Medication Sig    norgestimate-ethinyl estradioL (Sprintec, 28,) 0.25-35 mg-mcg tab Take 1 Tablet by mouth daily for 30 days. spironolactone (ALDACTONE) 100 mg tablet Take 1 Tablet by mouth daily. metFORMIN (GLUCOPHAGE) 500 mg tablet Take 1 Tablet by mouth two (2) times daily (with meals). cetirizine (ZYRTEC) 10 mg tablet Take 1 Tablet by mouth daily. ALPRAZolam (XANAX) 1 mg tablet TAKE 1 TABLET BY MOUTH DAILY AS NEEDED FOR ANXIETY     No current facility-administered medications for this visit. Past Medical History:   Diagnosis Date    Depression     Excessive or frequent menstruation 2013    Headache     Hx of abnormal Pap smear 2013    ASCUS +HPV    Need for prophylactic vaccination and inoculation against other viral diseases(V04.89)     Gardasil injections completed all 3 previously. PCOS (polycystic ovarian syndrome)     Ruptured ovarian cyst        Past Surgical History:   Procedure Laterality Date    HX  SECTION  14    HX DILATION AND CURETTAGE  10/2016       Family History   Problem Relation Age of Onset    Hypertension Father     Hypertension Mother     Diabetes Maternal Grandmother        Social History     Socioeconomic History    Marital status: SINGLE     Spouse name: Not on file    Number of children: Not on file    Years of education: Not on file    Highest education level: Not on file   Occupational History    Not on file   Tobacco Use    Smoking status: Never    Smokeless tobacco: Never   Substance and Sexual Activity    Alcohol use: No    Drug use:  No Sexual activity: Yes     Partners: Male     Birth control/protection: None   Other Topics Concern    Not on file   Social History Narrative    Not on file     Social Determinants of Health     Financial Resource Strain: Not on file   Food Insecurity: Not on file   Transportation Needs: Not on file   Physical Activity: Not on file   Stress: Not on file   Social Connections: Not on file   Intimate Partner Violence: Not on file   Housing Stability: Not on file         ROS   Review of Systems   Constitutional: Negative. HENT: Negative. Eyes: Negative. Respiratory: Negative. Cardiovascular: Negative. Gastrointestinal: Negative. Genitourinary: Negative. Musculoskeletal: Negative. Skin: Negative. Neurological: Negative. Endo/Heme/Allergies: Negative. Psychiatric/Behavioral: Negative. BP (!) 150/90   Ht 5' 4\" (1.626 m)   Wt 213 lb 4 oz (96.7 kg)   BMI 36.60 kg/m²    OBGyn Exam   Constitutional  Appearance: well-nourished, well developed, alert, in no acute distress    HENT  Head and Face: appears normal    Neck  Inspection/Palpation: normal appearance, no masses or tenderness  Lymph Nodes: no lymphadenopathy present  Thyroid: gland size normal, nontender, no nodules or masses present on palpation    Breasts  Symmetric, no palpable masses, no tenderness, no skin changes, no nipple abnormality, no nipple discharge, no lymphadenopathy. Chest  Respiratory Effort: breathing labored  Auscultation: normal breath sounds    Cardiovascular  Heart:   Auscultation: regular rate and rhythm without murmur    Gastrointestinal  Abdominal Examination: abdomen non-tender to palpation, normal bowel sounds, no masses present  Liver and spleen: no hepatomegaly present, spleen not palpable  Hernias: no hernias identified    Genitourinary  External Genitalia: normal appearance for age, no discharge present, no tenderness present, no inflammatory lesions present, no masses present, no atrophy present  Vagina: normal vaginal vault without central or paravaginal defects, no discharge present, no inflammatory lesions present, no masses present  Bladder: non-tender to palpation  Urethra: appears normal  Cervix: normal   Uterus: normal size, shape and consistency  Adnexa: no adnexal tenderness present, no adnexal masses present  Perineum: perineum within normal limits, no evidence of trauma, no rashes or skin lesions present  Anus: anus within normal limits, no hemorrhoids present  Inguinal Lymph Nodes: no lymphadenopathy present    Skin  General Inspection: no rash, no lesions identified    Neurologic/Psychiatric  Mental Status:  Orientation: grossly oriented to person, place and time  Mood and Affect: mood normal, affect appropriate    No results found for this visit on 10/07/22. Orders Placed This Encounter    norgestimate-ethinyl estradioL (Sprintec, 28,) 0.25-35 mg-mcg tab     Sig: Take 1 Tablet by mouth daily for 30 days. Dispense:  1 Dose Pack     Refill:  11    spironolactone (ALDACTONE) 100 mg tablet     Sig: Take 1 Tablet by mouth daily. Dispense:  30 Tablet     Refill:  5    metFORMIN (GLUCOPHAGE) 500 mg tablet     Sig: Take 1 Tablet by mouth two (2) times daily (with meals). Dispense:  120 Tablet     Refill:  5    PAP IG, RFX APTIMA HPV ASCUS ([id number])     Order Specific Question:   Pap Source? Answer:   Cervical     Order Specific Question:   Total Hysterectomy? Answer:   No     Order Specific Question:   Supracervical Hysterectomy? Answer:   No     Order Specific Question:   Post Menopausal?     Answer:   No     Order Specific Question:   Hormone Therapy? Answer:   No     Order Specific Question:   IUD? Answer:   No     Order Specific Question:   Abnormal Bleeding? Answer:   No     Order Specific Question:   Pregnant     Answer:   No     Order Specific Question:   Post Partum?      Answer:   No     26 YO ANNUAL  PCOS, STOPPED TAKING MEDS AND PERIOD GOT WORSE AND GAIN SOME WEIGHT  DESIRE TO RESTART MEDS    1. Pap smear, as part of routine gynecological examination    - PAP IG, RFX APTIMA HPV ASCUS ([id number])    2. Screening for human papillomavirus    - PAP IG, RFX APTIMA HPV ASCUS ([id number])    3. PCOS (polycystic ovarian syndrome)    - norgestimate-ethinyl estradioL (Sprintec, 28,) 0.25-35 mg-mcg tab; Take 1 Tablet by mouth daily for 30 days. Dispense: 1 Dose Pack; Refill: 11  - spironolactone (ALDACTONE) 100 mg tablet; Take 1 Tablet by mouth daily. Dispense: 30 Tablet; Refill: 5  - metFORMIN (GLUCOPHAGE) 500 mg tablet; Take 1 Tablet by mouth two (2) times daily (with meals). Dispense: 120 Tablet;  Refill: 5    RTC IN 6 MONTHS

## 2022-10-12 ENCOUNTER — PATIENT MESSAGE (OUTPATIENT)
Dept: OBGYN CLINIC | Age: 27
End: 2022-10-12

## 2022-10-12 RX ORDER — ONDANSETRON 4 MG/1
4 TABLET, ORALLY DISINTEGRATING ORAL
Qty: 90 TABLET | Refills: 1 | Status: SHIPPED | OUTPATIENT
Start: 2022-10-12

## 2022-10-18 LAB
CYTOLOGIST CVX/VAG CYTO: ABNORMAL
CYTOLOGY CVX/VAG DOC CYTO: ABNORMAL
CYTOLOGY CVX/VAG DOC THIN PREP: ABNORMAL
DX ICD CODE: ABNORMAL
DX ICD CODE: ABNORMAL
LABCORP, 190119: ABNORMAL
Lab: ABNORMAL
OTHER STN SPEC: ABNORMAL
PATHOLOGIST CVX/VAG CYTO: ABNORMAL
STAT OF ADQ CVX/VAG CYTO-IMP: ABNORMAL

## 2022-10-19 ENCOUNTER — OFFICE VISIT (OUTPATIENT)
Dept: OBGYN CLINIC | Age: 27
End: 2022-10-19
Payer: COMMERCIAL

## 2022-10-19 VITALS
HEIGHT: 64 IN | SYSTOLIC BLOOD PRESSURE: 134 MMHG | WEIGHT: 212.38 LBS | BODY MASS INDEX: 36.26 KG/M2 | DIASTOLIC BLOOD PRESSURE: 70 MMHG

## 2022-10-19 DIAGNOSIS — R87.611 PAP SMEAR OF CERVIX WITH ASCUS, CANNOT EXCLUDE HGSIL: Primary | ICD-10-CM

## 2022-10-19 PROCEDURE — 57454 BX/CURETT OF CERVIX W/SCOPE: CPT | Performed by: OBSTETRICS & GYNECOLOGY

## 2022-10-19 NOTE — PROGRESS NOTES
Subjective:  Chief Complaints:        1. Recent abnormal pap smear. 2. Here for Colposcopy.:    HPI:         Emigdio Marie is a 28 y.o. female who came to today for colposcopy after abnormal pap smear findings. Patient is doing well today and has no complaints. ROS:  RESPIRATORY:     Negative for shortness of breath, chest pain, chest congestion, cough. CARDIOLOGY:    Negative for dizziness, chest pain, palpitations. FEMALE REPRODUCTIVE:    Negative for abnormal vaginal discharge, abnormal vaginal bleeding. UROLOGY:    Negative for difficulty urinating, voiding dysfunction, frequent urination, dysuria. Gyn History:    OB History:  OB History    Para Term  AB Living   1 1 1 0 0 1   SAB IAB Ectopic Molar Multiple Live Births   0 0 0   0 1      # Outcome Date GA Lbr Williams/2nd Weight Sex Delivery Anes PTL Lv   1 Term 07/15/14 38w2d  6 lb 14.4 oz (3.13 kg) F  LO EPIDURAL AN N IVORY       Current Outpatient Medications   Medication Sig    ondansetron (ZOFRAN ODT) 4 mg disintegrating tablet Take 1 Tablet by mouth every eight (8) hours as needed for Nausea or Vomiting. norgestimate-ethinyl estradioL ([employer]inte[employer], 28,) 0.25-35 mg-mcg tab Take 1 Tablet by mouth daily for 30 days. spironolactone (ALDACTONE) 100 mg tablet Take 1 Tablet by mouth daily. metFORMIN (GLUCOPHAGE) 500 mg tablet Take 1 Tablet by mouth two (2) times daily (with meals). cetirizine (ZYRTEC) 10 mg tablet Take 1 Tablet by mouth daily. ALPRAZolam (XANAX) 1 mg tablet TAKE 1 TABLET BY MOUTH DAILY AS NEEDED FOR ANXIETY     No current facility-administered medications for this visit. Objective:  Physical Examination:  GENERAL:     General Appearance: well-appearing, well-developed, no acute distress. Hygiene: unremarkable. Mental Status:  alert and oriented. Mood/Affect:  pleasant. Speech: unremarkable. HEART:     Rhythm:  regular.   Rate:  normal.  LUNGS:     Auscultation:  CTA bilaterally, no wheezing/rhonchi/rales. ABDOMEN:       Guarding:  none. Tenderness:  none. Masses:  none palpable. Inguinal nodes:  not enlarged. Ascites:  none. Bowel sounds:  normoactive. Distention:  none. Rebound tenderness:  none. RECTUM:     Anus:  no fissures, unremarkable. GENITOURINARY - FEMALE:     Vagina:  pink/moist mucosa, no gross evidence of lesions, no abnormal discharge. Cervix/cuff: normal appearance, no lesions/discharge/bleeding:  Colposcopy performed: see Procedure. External genitalia: normal.    Assessment:  The encounter diagnosis was Pap smear of cervix with ASCUS, cannot exclude HGSIL. Plan:  Pap smear results reviewed with pt. Colposcopy done today. Discussed importance of strict followup and to avoid smoking: Depending on severity, followup may be every 4-6 months or sooner if higher grade and may need immediate treatment. Goal is to prevent progression to Cervical Cancer. Discussed immunes system boosters such as adequate sleep; daily multivitamins, diet rich in wholesome nutritional foods and antioxidants and safe sex practices. Patient expressed understanding; All questions answered. Procedures:  Colposcopy:  Informed consent Risk, complications, benefits and alternatives discussed with patient, Consent obtained, 30 second time out taken. Pregnancy status negative. Negative Pregnancy Test.  Colposcopy procedure Acetic Acid 4-6% solution applied to cervix, Endocervical Curreting was performed, Biopsy(ies) taken at 6 and 9 o'clock. ,Monsels paste applied to biopsy site(S),. Post Colposcopy Hemostasis was assured, Patient tolerated the procedure well, Instructed nothing in vagina for 4-6 days, Stressed importance of strict followup, Discussed importance of NOT SMOKING. Gardasil discussed in patients under 32years old, discussed that Gardasil (HPV Vaccine) can still offer protection against up to 70% of HPV types tht cause wqrts or cervical cancer.     Orders Placed This Encounter    COLPOSC,CERVIX W/ADJ VAG,W/BX & CURRETAG    SURGICAL PATHOLOGY     Order Specific Question:   Type of Specimen and Locations? Answer:   6 oclock     Order Specific Question:   Patient's clinical history:     Answer:   [facility]    SURGICAL PATHOLOGY     Order Specific Question:   Type of Specimen and Locations? Answer:   9 oclock     Order Specific Question:   Patient's clinical history:     Answer:   [facility]    SURGICAL PATHOLOGY     Order Specific Question:   Type of Specimen and Locations? Answer:   [facility]     Order Specific Question:   Patient's clinical history:     Answer:   [facility]       Preventive:  If < 32years old, discussed HPV/Cervical CA prevention; Implications of Gardasil Vaccine and May prevent infection of HPV types that cause 70% of warts or cervical dysplasias, however, Yearly PAPs with HPV DNA testing is still necessary. This [facility] be [facility] at [facility].; Discussed need to quit smoking if smoker. Discussed importance of strict followup PAPs and colposcopies as recommended. Follow Up: Follow-up and Dispositions    Return in about 6 months (around 4/19/2023).

## 2022-10-21 LAB
CPT DISCLAIMER: NORMAL
DIAGNOSIS SYNOPSIS:: NORMAL
DX ICD CODE: NORMAL
PATH REPORT.FINAL DX SPEC: NORMAL
PATH REPORT.GROSS SPEC: NORMAL
PATH REPORT.SITE OF ORIGIN SPEC: NORMAL
PATHOLOGIST NAME: NORMAL
PAYMENT PROCEDURE: NORMAL

## 2022-10-24 LAB
CPT DISCLAIMER: ABNORMAL
DIAGNOSIS SYNOPSIS:: ABNORMAL
DX ICD CODE: ABNORMAL
DX ICD CODE: ABNORMAL
PATH REPORT.FINAL DX SPEC: ABNORMAL
PATH REPORT.GROSS SPEC: ABNORMAL
PATH REPORT.RELEVANT HX SPEC: ABNORMAL
PATH REPORT.SITE OF ORIGIN SPEC: ABNORMAL
PATHOLOGIST NAME: ABNORMAL
PAYMENT PROCEDURE: ABNORMAL

## 2022-10-25 LAB
CPT DISCLAIMER: ABNORMAL
DIAGNOSIS SYNOPSIS:: ABNORMAL
DX ICD CODE: ABNORMAL
DX ICD CODE: ABNORMAL
Lab: ABNORMAL
PATH REPORT.COMMENTS IMP SPEC: ABNORMAL
PATH REPORT.FINAL DX SPEC: ABNORMAL
PATH REPORT.GROSS SPEC: ABNORMAL
PATH REPORT.SITE OF ORIGIN SPEC: ABNORMAL
PATHOLOGIST NAME: ABNORMAL
PAYMENT PROCEDURE: ABNORMAL

## 2022-12-05 ENCOUNTER — DOCUMENTATION ONLY (OUTPATIENT)
Dept: FAMILY MEDICINE CLINIC | Age: 27
End: 2022-12-05

## 2023-01-05 ENCOUNTER — VIRTUAL VISIT (OUTPATIENT)
Dept: FAMILY MEDICINE CLINIC | Age: 28
End: 2023-01-05
Payer: COMMERCIAL

## 2023-01-05 DIAGNOSIS — E28.2 PCOS (POLYCYSTIC OVARIAN SYNDROME): ICD-10-CM

## 2023-01-05 DIAGNOSIS — E66.01 MORBID OBESITY (HCC): Primary | ICD-10-CM

## 2023-01-05 PROCEDURE — 99213 OFFICE O/P EST LOW 20 MIN: CPT | Performed by: FAMILY MEDICINE

## 2023-01-05 RX ORDER — PEN NEEDLE, DIABETIC 31 GX3/16"
NEEDLE, DISPOSABLE MISCELLANEOUS
Qty: 30 PEN NEEDLE | Refills: 11 | Status: SHIPPED | OUTPATIENT
Start: 2023-01-05

## 2023-01-05 RX ORDER — SEMAGLUTIDE 1.34 MG/ML
0.25 INJECTION, SOLUTION SUBCUTANEOUS
Qty: 4 BOX | Refills: 0 | Status: SHIPPED | OUTPATIENT
Start: 2023-01-05

## 2023-01-05 NOTE — PROGRESS NOTES
Consent: Gerald Licona, who was seen by synchronous (real-time) audio-video technology, and/or her healthcare decision maker, is aware that this patient-initiated, Telehealth encounter on 1/5/2023 is a billable service, with coverage as determined by her insurance carrier. She is aware that she may receive a bill and has provided verbal consent to proceed: YES-Consent obtained within past 12 months  712    Prior to Admission medications    Medication Sig Start Date End Date Taking? Authorizing Provider   semaglutide (Ozempic) 0.25 mg or 0.5 mg/dose (2 mg/1.5 ml) subq pen 0.25 mg by SubCUTAneous route every seven (7) days. 1/5/23  Yes Rebekah Butler MD   Insulin Needles, Disposable, (Sylvia Pen Needle) 32 gauge x 5/32\" ndle 1 shot daily 1/5/23  Yes Rebekah Butler MD   ondansetron (ZOFRAN ODT) 4 mg disintegrating tablet Take 1 Tablet by mouth every eight (8) hours as needed for Nausea or Vomiting. 10/12/22   Wilton Decker MD   spironolactone (ALDACTONE) 100 mg tablet Take 1 Tablet by mouth daily. 10/7/22   Wilton Decker MD   metFORMIN (GLUCOPHAGE) 500 mg tablet Take 1 Tablet by mouth two (2) times daily (with meals). 10/7/22   Wilton Decker MD   cetirizine (ZYRTEC) 10 mg tablet Take 1 Tablet by mouth daily. 9/14/22   Rebekah Butler MD   ALPRAZolam [facility]) 1 mg tablet TAKE 1 TABLET BY MOUTH DAILY AS NEEDED FOR ANXIETY 2/1/22   Mirlande Mays NP     Allergies   Allergen Reactions    Latex Rash    Hydrocodone Nausea and Vomiting           Assessment & Plan:   Diagnoses and all orders for this visit:    1. Morbid obesity ([address]  -     semaglutide (Ozempic) 0.25 mg or 0.5 mg/dose (2 mg/1.5 ml) subq pen; 0.25 mg by SubCUTAneous route every seven (7) days. Patient would like to try weight loss medications. She states her insurance plan covers Ozempic. We will start low-dose Ozempic and taper once monthly. Discussed patient ADRs of medication.   2. PCOS (polycystic ovarian syndrome)  Continue with metformin  Other orders  -     Insulin Needles, Disposable, (Sylvia Pen Needle) 32 gauge x 5/32\" ndle; 1 shot daily        Medication Side Effects and Warnings were discussed with patient  Patient Labs were reviewed and or requested:  Patient Past Records were reviewed and or requested              We discussed the expected course, resolution and complications of the diagnosis(es) in detail. Medication risks, benefits, costs, interactions, and alternatives were discussed as indicated. I advised her to contact the office if her condition worsens, changes or fails to improve as anticipated. She expressed understanding with the diagnosis(es) and plan. Sandra Miller, was evaluated through a synchronous (real-time) audio-video encounter. The patient (or guardian if applicable) is aware that this is a billable service, which includes applicable co-pays. This Virtual Visit was conducted with patient's (and/or legal guardian's) consent. The visit was conducted pursuant to the emergency declaration under the [address] [place] [address] authority and the [facility] and [employer] Act. Patient identification was verified, and a caregiver was present when appropriate. The patient was located in a state where the provider was licensed to provide care. Services were provided through a video synchronous discussion virtually to substitute for in-person clinic visit. Patient and provider were located at their individual homes. I have discussed the diagnosis with the patient and the intended plan as seen in the above orders. The patient understands and agrees with the plan. The patient has received an after-visit summary and questions were answered concerning future plans.      Medication Side Effects and Warnings were discussed with patient  Patient Labs were reviewed and or requested:  Patient Past Records were reviewed and or requested    Edie Wright M.D. There are no Patient Instructions on file for this visit.

## 2023-01-30 RX ORDER — AZITHROMYCIN 250 MG/1
TABLET, FILM COATED ORAL
Qty: 6 TABLET | Refills: 0 | Status: SHIPPED | OUTPATIENT
Start: 2023-01-30

## 2023-01-30 RX ORDER — FLUCONAZOLE 150 MG/1
150 TABLET ORAL DAILY
Qty: 1 TABLET | Refills: 5 | Status: SHIPPED | OUTPATIENT
Start: 2023-01-30 | End: 2023-01-31

## 2023-02-06 ENCOUNTER — VIRTUAL VISIT (OUTPATIENT)
Dept: FAMILY MEDICINE CLINIC | Age: 28
End: 2023-02-06
Payer: COMMERCIAL

## 2023-02-06 DIAGNOSIS — J01.00 SUBACUTE MAXILLARY SINUSITIS: Primary | ICD-10-CM

## 2023-02-06 PROCEDURE — 99213 OFFICE O/P EST LOW 20 MIN: CPT | Performed by: STUDENT IN AN ORGANIZED HEALTH CARE EDUCATION/TRAINING PROGRAM

## 2023-02-06 RX ORDER — AMOXICILLIN 250 MG/5ML
10 POWDER, FOR SUSPENSION ORAL 3 TIMES DAILY
Qty: 210 ML | Refills: 0 | Status: SHIPPED | OUTPATIENT
Start: 2023-02-06 | End: 2023-02-13

## 2023-02-06 RX ORDER — FLUCONAZOLE 150 MG/1
150 TABLET ORAL DAILY
Qty: 1 TABLET | Refills: 0 | Status: SHIPPED | OUTPATIENT
Start: 2023-02-06 | End: 2023-02-07

## 2023-02-06 NOTE — PROGRESS NOTES
Heide Farfan is a 28 y.o. female , id x 2(name and ). Reviewed questionnaires, and  medications. Chief Complaint   Patient presents with    Sinus Infection     X 3 week states that she was given a zpack last Monday which did not help. Would Like to know if she can have amoxicillin?       3 most recent PHQ Screens 2023   PHQ Not Done -   Little interest or pleasure in doing things Not at all   Feeling down, depressed, irritable, or hopeless Not at all   Total Score PHQ 2 0       1. Have you been to the ER, urgent care clinic since your last visit? Hospitalized since your last visit? No    2. Have you seen or consulted any other health care providers outside of the [address] [place] [address] since your last visit? Include any pap smears or colon screening.  No

## 2023-02-06 NOTE — PROGRESS NOTES
Progress Note    she is a 28y.o. year old female who presents for evalution. Assessment/ Plan:   {There are no diagnoses linked to this encounter. (Refresh or delete this SmartLink)}         I have discussed the diagnosis with the patient and the intended plan as seen in the above orders. The patient has received an after-visit summary and questions were answered concerning future plans. Pt conveyed understanding of plan. Medication Side Effects and Warnings were discussed with patient       Subjective:     Chief Complaint   Patient presents with    Sinus Infection     X 3 week states that she was given a zpack last Monday which did not help. Would Like to know if she can have amoxicillin? Lingering symptoms. Sinus pain and pressure under eyes and gums hurt   Yellow and green nasal drainage   Issues with the left ear, needs to pop it in the morning   Cough primarily at night   No fevers, chills  Received zpack from Dr. Cady Tompkins last week  Clifford Cris doesn't normally help   Issues with swallowing pills  Needs diflucan after antibiotics. Using flonase every morning, also zyrtec or claritin. Reviewed PmHx, RxHx, FmHx, SocHx, AllgHx and updated and dated in the chart. Review of Systems - negative except as listed above in the HPI      Objective: There were no vitals filed for this visit. Current Outpatient Medications   Medication Sig    semaglutide (Ozempic) 0.25 mg or 0.5 mg/dose (2 mg/1.5 ml) subq pen 0.25 mg by SubCUTAneous route every seven (7) days.  Insulin Needles, Disposable, (Sylvia Pen Needle) 32 gauge x 5/32\" ndle 1 shot daily    ondansetron (ZOFRAN ODT) 4 mg disintegrating tablet Take 1 Tablet by mouth every eight (8) hours as needed for Nausea or Vomiting.  spironolactone (ALDACTONE) 100 mg tablet Take 1 Tablet by mouth daily.  metFORMIN (GLUCOPHAGE) 500 mg tablet Take 1 Tablet by mouth two (2) times daily (with meals).     ALPRAZolam (XANAX) 1 mg tablet TAKE 1 TABLET BY MOUTH DAILY AS NEEDED FOR ANXIETY    azithromycin (ZITHROMAX) 250 mg tablet Take two tablets today then one tablet daily    cetirizine (ZYRTEC) 10 mg tablet Take 1 Tablet by mouth daily. No current facility-administered medications for this visit. Physical Exam          I was {location home office other:53965::\"at home\"} while conducting this encounter. Total Time: {minutes:48036::\"5-10 minutes\"}. Roberth Cowan is a 28 y.o. female being evaluated by a Virtual Visit (video visit) encounter to address concerns as mentioned above. A caregiver was present when appropriate. Due to this being a TeleHealth encounter (During [facility] public health emergency), evaluation of the following organ systems was limited: Vitals/Constitutional/EENT/Resp/CV/GI//MS/Neuro/Skin/Heme-Lymph-Imm. Pursuant to the emergency declaration under the [address] [place] [address] and the [employer] and [employer] Act, this Virtual Visit was conducted with patient's (and/or legal guardian's) consent, to reduce the risk of exposure to COVID-19 and provide necessary medical care. Services were provided through a video synchronous discussion virtually to substitute for in-person encounter. --Francisca Prince MD on 2/6/2023 at 9:54 AM    An electronic signature was used to authenticate this note. emergency declaration under the [facility] [address] authority and the [employer] and [employer] Act, this Virtual Visit was conducted with patient's (and/or legal guardian's) consent, to reduce the risk of exposure to COVID-19 and provide necessary medical care. Services were provided through a video synchronous discussion virtually to substitute for in-person encounter. --Vick Carter MD on 2/6/2023 at 9:54 AM    An electronic signature was used to authenticate this note.

## 2023-03-18 DIAGNOSIS — E66.01 MORBID OBESITY (HCC): ICD-10-CM

## 2023-03-20 RX ORDER — SEMAGLUTIDE 1.34 MG/ML
INJECTION, SOLUTION SUBCUTANEOUS
Qty: 4 PEN | Refills: 1 | Status: SHIPPED | OUTPATIENT
Start: 2023-03-20

## 2023-07-07 ENCOUNTER — OFFICE VISIT (OUTPATIENT)
Age: 28
End: 2023-07-07

## 2023-07-07 VITALS
RESPIRATION RATE: 16 BRPM | BODY MASS INDEX: 36.11 KG/M2 | HEART RATE: 94 BPM | HEIGHT: 64 IN | DIASTOLIC BLOOD PRESSURE: 59 MMHG | SYSTOLIC BLOOD PRESSURE: 124 MMHG | TEMPERATURE: 98 F | OXYGEN SATURATION: 98 % | WEIGHT: 211.5 LBS

## 2023-07-07 DIAGNOSIS — Z12.4 SCREENING FOR MALIGNANT NEOPLASM OF CERVIX: Primary | ICD-10-CM

## 2023-07-07 DIAGNOSIS — R87.612 LGSIL ON PAP SMEAR OF CERVIX: ICD-10-CM

## 2023-07-07 NOTE — PROGRESS NOTES
no hemorrhoids present  Inguinal Lymph Nodes: no lymphadenopathy present    Skin  General Inspection: no rash, no lesions identified    Neurologic/Psychiatric  Mental Status:  Orientation: grossly oriented to person, place and time  Mood and Affect: mood normal, affect appropriate    [unfilled]     Orders Placed This Encounter   Procedures    PAP IG, Aptima HPV and rfx 16/18,45 ([id number]) ([facility])     Order Specific Question:   Pap Source? (Required)     Answer:   ENDOCERVIX     Order Specific Question:   Pap collection method? (Required     Answer:   broom     Order Specific Question:   Menstrual Status ? Answer:   Having periods [5]     Order Specific Question:   Date of LMP? (Required)     Answer:   8/28/2023     Order Specific Question:   Previous Treatment? (Required)     Answer:   COLP-BX     Order Specific Question:   Previous Treatment Results? Answer:   LGSIL     Order Specific Question:   Previous Cyto Info? Answer:   ATYPICAL     Order Specific Question:   Previous Cyto Info? Answer:   DYSPLASIA     26 YO WITH LGSIL COMING FOR 6 MONTHS RE PAP    1. Screening for malignant neoplasm of cervix    - PAP IG, Aptima HPV and rfx 16/18,45 ([id number]) ([facility])    2. LGSIL on Pap smear of cervix          No follow-ups on file.

## 2023-07-12 ENCOUNTER — PATIENT MESSAGE (OUTPATIENT)
Age: 28
End: 2023-07-12

## 2023-07-12 DIAGNOSIS — F41.1 GENERALIZED ANXIETY DISORDER: Primary | ICD-10-CM

## 2023-07-12 LAB
CYTOLOGIST CVX/VAG CYTO: NORMAL
CYTOLOGY CVX/VAG DOC CYTO: NORMAL
CYTOLOGY CVX/VAG DOC THIN PREP: NORMAL
DX ICD CODE: NORMAL
HPV GENOTYPE REFLEX: NORMAL
HPV I/H RISK 4 DNA CVX QL PROBE+SIG AMP: NEGATIVE
Lab: NORMAL
Lab: NORMAL
OTHER STN SPEC: NORMAL
STAT OF ADQ CVX/VAG CYTO-IMP: NORMAL

## 2023-07-12 RX ORDER — ALPRAZOLAM 1 MG/1
1 TABLET ORAL DAILY PRN
Qty: 30 TABLET | Refills: 0 | Status: SHIPPED | OUTPATIENT
Start: 2023-07-12 | End: 2023-08-11

## 2023-08-14 ENCOUNTER — CLINICAL DOCUMENTATION (OUTPATIENT)
Age: 28
End: 2023-08-14

## 2023-08-14 NOTE — PROGRESS NOTES
LVM to return call to office to schedule appointment. Virtual is ok or in person.  Please assist with scheduling

## 2023-08-31 ENCOUNTER — TELEMEDICINE (OUTPATIENT)
Age: 28
End: 2023-08-31
Payer: COMMERCIAL

## 2023-08-31 DIAGNOSIS — F41.1 GENERALIZED ANXIETY DISORDER: Primary | ICD-10-CM

## 2023-08-31 PROCEDURE — 99213 OFFICE O/P EST LOW 20 MIN: CPT | Performed by: FAMILY MEDICINE

## 2023-08-31 RX ORDER — ONDANSETRON 4 MG/1
4 TABLET, ORALLY DISINTEGRATING ORAL EVERY 8 HOURS PRN
Qty: 30 TABLET | Refills: 2 | Status: SHIPPED | OUTPATIENT
Start: 2023-08-31

## 2023-08-31 RX ORDER — ESCITALOPRAM OXALATE 10 MG/1
10 TABLET ORAL DAILY
Qty: 90 TABLET | Refills: 1 | Status: SHIPPED | OUTPATIENT
Start: 2023-08-31

## 2023-08-31 ASSESSMENT — PATIENT HEALTH QUESTIONNAIRE - PHQ9
SUM OF ALL RESPONSES TO PHQ QUESTIONS 1-9: 0
SUM OF ALL RESPONSES TO PHQ9 QUESTIONS 1 & 2: 0
SUM OF ALL RESPONSES TO PHQ QUESTIONS 1-9: 0
SUM OF ALL RESPONSES TO PHQ QUESTIONS 1-9: 0
1. LITTLE INTEREST OR PLEASURE IN DOING THINGS: 0
2. FEELING DOWN, DEPRESSED OR HOPELESS: 0
SUM OF ALL RESPONSES TO PHQ QUESTIONS 1-9: 0

## 2023-08-31 NOTE — PROGRESS NOTES
requested    Follow-up and Dispositions    Return in about 3 months (around 11/30/2023) for ADD. We discussed the expected course, resolution and complications of the diagnosis(es) in detail. Medication risks, benefits, costs, interactions, and alternatives were discussed as indicated. I advised her to contact the office if her condition worsens, changes or fails to improve as anticipated. She expressed understanding with the diagnosis(es) and plan. Services were provided through a video synchronous discussion virtually to substitute for in-person clinic visit. Patient and provider were located at their individual homes. Patt Thomas, was evaluated through a synchronous (real-time) audio-video encounter. The patient (or guardian if applicable) is aware that this is a billable service, which includes applicable co-pays. This Virtual Visit was conducted with patient's (and/or legal guardian's) consent. Patient identification was verified, and a caregiver was present when appropriate. The patient was located at Home: [address] [place] [id number]  Provider was located at [facility] ([address] [place] [address]): Nenita Banerjee MD on 8/31/2023 at 8:15 AM    An electronic signature was used to authenticate this note. I have discussed the diagnosis with the patient and the intended plan as seen in the above orders. The patient understands and agrees with the plan. The patient has received an after-visit summary and questions were answered concerning future plans. Medication Side Effects and Warnings were discussed with patient  Patient Labs were reviewed and or requested:  Patient Past Records were reviewed and or requested    Chuy Noriega M.D. There are no Patient Instructions on file for this visit.

## 2023-09-25 ENCOUNTER — TELEPHONE (OUTPATIENT)
Age: 28
End: 2023-09-25

## 2023-09-25 DIAGNOSIS — E28.2 PCOS (POLYCYSTIC OVARIAN SYNDROME): Primary | ICD-10-CM

## 2023-09-25 RX ORDER — NORGESTIMATE AND ETHINYL ESTRADIOL 0.25-0.035
1 KIT ORAL DAILY
Qty: 3 PACKET | Refills: 0 | Status: SHIPPED | OUTPATIENT
Start: 2023-09-25

## 2023-09-25 NOTE — TELEPHONE ENCOUNTER
Patient would like to know if she can get a refill to her birth control to hold her until the appointment with Dr. Vineet Ware on 10/24

## 2023-09-27 DIAGNOSIS — F41.1 GENERALIZED ANXIETY DISORDER: ICD-10-CM

## 2023-09-28 RX ORDER — ALPRAZOLAM 1 MG/1
TABLET ORAL
Qty: 30 TABLET | Refills: 1 | Status: SHIPPED | OUTPATIENT
Start: 2023-09-28 | End: 2024-09-29

## 2024-01-24 ENCOUNTER — TELEPHONE (OUTPATIENT)
Age: 29
End: 2024-01-24

## 2024-01-24 NOTE — TELEPHONE ENCOUNTER
1/24/2024 @9:31am-Called [phone] and L/M on VM to have patient contact the office at [phone] regarding message she sent via portal to schedule an annual exam w/hormonal level panel.ww

## 2024-02-07 ENCOUNTER — TELEPHONE (OUTPATIENT)
Age: 29
End: 2024-02-07

## 2024-02-07 ENCOUNTER — PATIENT MESSAGE (OUTPATIENT)
Age: 29
End: 2024-02-07

## 2024-02-07 NOTE — TELEPHONE ENCOUNTER
Called and left a voicemail at 11:29 regarding the patients scheduled appointment with Dr. Solorzano. Location has changed to [place]. I also sent a [employer] message to the patient.

## 2024-02-07 NOTE — TELEPHONE ENCOUNTER
From: Vijaya IRBY  To: Alissa Tapia  Sent: 2/7/2024 11:34 AM EST  Subject: Location change for upcoming appointment with Dr. Solorzano    Good Morning,     Alissa I left a voicemail earlier today to inform you of the location change for your upcoming appointment with Dr. Solorzano. He will now be in [place]. Your appointment is scheduled for 02/21/2024 at 10:00 AM. We apologize for the inconvenience this may cause. Please respond to this message or give us a call. Thank you!     Address: [address] [place] [id number].